# Patient Record
Sex: FEMALE | Race: WHITE | NOT HISPANIC OR LATINO | Employment: OTHER | URBAN - METROPOLITAN AREA
[De-identification: names, ages, dates, MRNs, and addresses within clinical notes are randomized per-mention and may not be internally consistent; named-entity substitution may affect disease eponyms.]

---

## 2017-10-10 ENCOUNTER — HOSPITAL ENCOUNTER (OUTPATIENT)
Dept: RADIOLOGY | Facility: MEDICAL CENTER | Age: 62
DRG: 460 | End: 2017-10-10
Attending: NEUROLOGICAL SURGERY | Admitting: NEUROLOGICAL SURGERY
Payer: COMMERCIAL

## 2017-10-10 DIAGNOSIS — Z01.812 PRE-OPERATIVE LABORATORY EXAMINATION: ICD-10-CM

## 2017-10-10 DIAGNOSIS — Z01.810 PRE-OPERATIVE CARDIOVASCULAR EXAMINATION: ICD-10-CM

## 2017-10-10 LAB
ANION GAP SERPL CALC-SCNC: 9 MMOL/L (ref 0–11.9)
APTT PPP: 30.2 SEC (ref 24.7–36)
BASOPHILS # BLD AUTO: 0.8 % (ref 0–1.8)
BASOPHILS # BLD: 0.07 K/UL (ref 0–0.12)
BUN SERPL-MCNC: 18 MG/DL (ref 8–22)
CALCIUM SERPL-MCNC: 9.3 MG/DL (ref 8.5–10.5)
CHLORIDE SERPL-SCNC: 105 MMOL/L (ref 96–112)
CO2 SERPL-SCNC: 25 MMOL/L (ref 20–33)
CREAT SERPL-MCNC: 0.74 MG/DL (ref 0.5–1.4)
EKG IMPRESSION: NORMAL
EOSINOPHIL # BLD AUTO: 0.17 K/UL (ref 0–0.51)
EOSINOPHIL NFR BLD: 2 % (ref 0–6.9)
ERYTHROCYTE [DISTWIDTH] IN BLOOD BY AUTOMATED COUNT: 42.1 FL (ref 35.9–50)
GFR SERPL CREATININE-BSD FRML MDRD: >60 ML/MIN/1.73 M 2
GLUCOSE SERPL-MCNC: 113 MG/DL (ref 65–99)
HCT VFR BLD AUTO: 42.3 % (ref 37–47)
HGB BLD-MCNC: 14.2 G/DL (ref 12–16)
IMM GRANULOCYTES # BLD AUTO: 0.03 K/UL (ref 0–0.11)
IMM GRANULOCYTES NFR BLD AUTO: 0.4 % (ref 0–0.9)
INR PPP: 1.02 (ref 0.87–1.13)
LYMPHOCYTES # BLD AUTO: 2.45 K/UL (ref 1–4.8)
LYMPHOCYTES NFR BLD: 28.8 % (ref 22–41)
MCH RBC QN AUTO: 28.6 PG (ref 27–33)
MCHC RBC AUTO-ENTMCNC: 33.6 G/DL (ref 33.6–35)
MCV RBC AUTO: 85.3 FL (ref 81.4–97.8)
MONOCYTES # BLD AUTO: 0.61 K/UL (ref 0–0.85)
MONOCYTES NFR BLD AUTO: 7.2 % (ref 0–13.4)
NEUTROPHILS # BLD AUTO: 5.17 K/UL (ref 2–7.15)
NEUTROPHILS NFR BLD: 60.8 % (ref 44–72)
NRBC # BLD AUTO: 0 K/UL
NRBC BLD AUTO-RTO: 0 /100 WBC
PLATELET # BLD AUTO: 293 K/UL (ref 164–446)
PMV BLD AUTO: 9.5 FL (ref 9–12.9)
POTASSIUM SERPL-SCNC: 4.1 MMOL/L (ref 3.6–5.5)
PROTHROMBIN TIME: 13.7 SEC (ref 12–14.6)
RBC # BLD AUTO: 4.96 M/UL (ref 4.2–5.4)
SODIUM SERPL-SCNC: 139 MMOL/L (ref 135–145)
WBC # BLD AUTO: 8.5 K/UL (ref 4.8–10.8)

## 2017-10-10 PROCEDURE — 71020 DX-CHEST-2 VIEWS: CPT

## 2017-10-10 PROCEDURE — 93005 ELECTROCARDIOGRAM TRACING: CPT

## 2017-10-10 PROCEDURE — 36415 COLL VENOUS BLD VENIPUNCTURE: CPT

## 2017-10-10 PROCEDURE — 85025 COMPLETE CBC W/AUTO DIFF WBC: CPT

## 2017-10-10 PROCEDURE — 85730 THROMBOPLASTIN TIME PARTIAL: CPT

## 2017-10-10 PROCEDURE — 80048 BASIC METABOLIC PNL TOTAL CA: CPT

## 2017-10-10 PROCEDURE — 93010 ELECTROCARDIOGRAM REPORT: CPT | Performed by: INTERNAL MEDICINE

## 2017-10-10 PROCEDURE — 85610 PROTHROMBIN TIME: CPT

## 2017-10-10 RX ORDER — SIMVASTATIN 40 MG
40 TABLET ORAL NIGHTLY
COMMUNITY

## 2017-10-10 RX ORDER — LORATADINE 10 MG/1
10 TABLET ORAL DAILY
COMMUNITY
End: 2019-02-28 | Stop reason: CLARIF

## 2017-10-10 RX ORDER — IBUPROFEN 800 MG/1
800 TABLET ORAL 3 TIMES DAILY
Status: ON HOLD | COMMUNITY
End: 2017-10-23

## 2017-10-10 RX ORDER — GABAPENTIN 300 MG/1
300 CAPSULE ORAL 3 TIMES DAILY
COMMUNITY

## 2017-10-10 RX ORDER — AMITRIPTYLINE HYDROCHLORIDE 150 MG/1
300 TABLET ORAL NIGHTLY
Status: ON HOLD | COMMUNITY
End: 2019-03-15

## 2017-10-10 RX ORDER — LEVOTHYROXINE SODIUM 0.15 MG/1
150 TABLET ORAL
COMMUNITY

## 2017-10-10 RX ORDER — LISINOPRIL 40 MG/1
40 TABLET ORAL DAILY
COMMUNITY
End: 2019-02-28 | Stop reason: CLARIF

## 2017-10-10 RX ORDER — HYDROCODONE BITARTRATE AND ACETAMINOPHEN 5; 325 MG/1; MG/1
1-2 TABLET ORAL PRN
COMMUNITY
End: 2019-02-28 | Stop reason: CLARIF

## 2017-10-10 RX ORDER — MULTIVIT WITH MINERALS/LUTEIN
1 TABLET ORAL 2 TIMES DAILY
COMMUNITY

## 2017-10-12 ENCOUNTER — APPOINTMENT (OUTPATIENT)
Dept: RADIOLOGY | Facility: MEDICAL CENTER | Age: 62
End: 2017-10-12
Attending: NEUROLOGICAL SURGERY
Payer: COMMERCIAL

## 2017-10-12 DIAGNOSIS — R93.7 ABNORMAL FINDINGS ON DIAGNOSTIC IMAGING OF MUSCULOSKELETAL SYSTEM: ICD-10-CM

## 2017-10-12 PROCEDURE — A9579 GAD-BASE MR CONTRAST NOS,1ML: HCPCS | Performed by: NEUROLOGICAL SURGERY

## 2017-10-12 PROCEDURE — 72158 MRI LUMBAR SPINE W/O & W/DYE: CPT

## 2017-10-12 PROCEDURE — 700117 HCHG RX CONTRAST REV CODE 255: Performed by: NEUROLOGICAL SURGERY

## 2017-10-12 RX ADMIN — GADODIAMIDE 20 ML: 287 INJECTION INTRAVENOUS at 14:42

## 2017-10-17 ENCOUNTER — HOSPITAL ENCOUNTER (OUTPATIENT)
Dept: RADIOLOGY | Facility: MEDICAL CENTER | Age: 62
End: 2017-10-17
Attending: NEUROLOGICAL SURGERY
Payer: COMMERCIAL

## 2017-10-17 DIAGNOSIS — M54.5 BILATERAL LOW BACK PAIN, UNSPECIFIED CHRONICITY, WITH SCIATICA PRESENCE UNSPECIFIED: ICD-10-CM

## 2017-10-17 PROCEDURE — 72100 X-RAY EXAM L-S SPINE 2/3 VWS: CPT

## 2017-10-20 ENCOUNTER — APPOINTMENT (OUTPATIENT)
Dept: RADIOLOGY | Facility: MEDICAL CENTER | Age: 62
DRG: 460 | End: 2017-10-20
Attending: NEUROLOGICAL SURGERY
Payer: COMMERCIAL

## 2017-10-20 ENCOUNTER — HOSPITAL ENCOUNTER (OUTPATIENT)
Dept: RADIOLOGY | Facility: MEDICAL CENTER | Age: 62
End: 2017-10-20

## 2017-10-20 ENCOUNTER — HOSPITAL ENCOUNTER (INPATIENT)
Facility: MEDICAL CENTER | Age: 62
LOS: 3 days | DRG: 460 | End: 2017-10-23
Attending: NEUROLOGICAL SURGERY | Admitting: NEUROLOGICAL SURGERY
Payer: COMMERCIAL

## 2017-10-20 PROBLEM — M54.50 LOWER BACK PAIN: Status: ACTIVE | Noted: 2017-10-20

## 2017-10-20 LAB
GLUCOSE BLD-MCNC: 116 MG/DL (ref 65–99)
GLUCOSE BLD-MCNC: 193 MG/DL (ref 65–99)

## 2017-10-20 PROCEDURE — A9270 NON-COVERED ITEM OR SERVICE: HCPCS | Performed by: CLINICAL NURSE SPECIALIST

## 2017-10-20 PROCEDURE — 700111 HCHG RX REV CODE 636 W/ 250 OVERRIDE (IP)

## 2017-10-20 PROCEDURE — 8E0WXBF COMPUTER ASSISTED PROCEDURE OF TRUNK REGION, WITH FLUOROSCOPY: ICD-10-PCS | Performed by: NEUROLOGICAL SURGERY

## 2017-10-20 PROCEDURE — 700101 HCHG RX REV CODE 250

## 2017-10-20 PROCEDURE — 503195 HCHG SEALER, BIPOLAR AQUAMANTYS: Performed by: NEUROLOGICAL SURGERY

## 2017-10-20 PROCEDURE — 500885 HCHG PACK, JACKSON TABLE: Performed by: NEUROLOGICAL SURGERY

## 2017-10-20 PROCEDURE — 502626 HCHG SURGIFLO HEMOSTATIC MATRIX 6ML: Performed by: NEUROLOGICAL SURGERY

## 2017-10-20 PROCEDURE — 700102 HCHG RX REV CODE 250 W/ 637 OVERRIDE(OP)

## 2017-10-20 PROCEDURE — A9270 NON-COVERED ITEM OR SERVICE: HCPCS

## 2017-10-20 PROCEDURE — 500819 HCHG MARKERS, PASSIVE REFLECTIVE: Performed by: NEUROLOGICAL SURGERY

## 2017-10-20 PROCEDURE — 500864 HCHG NEEDLE, SPINAL 18G: Performed by: NEUROLOGICAL SURGERY

## 2017-10-20 PROCEDURE — 160029 HCHG SURGERY MINUTES - 1ST 30 MINS LEVEL 4: Performed by: NEUROLOGICAL SURGERY

## 2017-10-20 PROCEDURE — 4A11X4G MONITORING OF PERIPHERAL NERVOUS ELECTRICAL ACTIVITY, INTRAOPERATIVE, EXTERNAL APPROACH: ICD-10-PCS | Performed by: NEUROLOGICAL SURGERY

## 2017-10-20 PROCEDURE — 500367 HCHG DRAIN KIT, HEMOVAC: Performed by: NEUROLOGICAL SURGERY

## 2017-10-20 PROCEDURE — 501423 HCHG SPONGE, SURGIFOAM 12X7: Performed by: NEUROLOGICAL SURGERY

## 2017-10-20 PROCEDURE — A6404 STERILE GAUZE > 48 SQ IN: HCPCS | Performed by: NEUROLOGICAL SURGERY

## 2017-10-20 PROCEDURE — 500105 HCHG BONE MILL BM200: Performed by: NEUROLOGICAL SURGERY

## 2017-10-20 PROCEDURE — 95940 IONM IN OPERATNG ROOM 15 MIN: CPT | Performed by: NEUROLOGICAL SURGERY

## 2017-10-20 PROCEDURE — 700102 HCHG RX REV CODE 250 W/ 637 OVERRIDE(OP): Performed by: CLINICAL NURSE SPECIALIST

## 2017-10-20 PROCEDURE — 770001 HCHG ROOM/CARE - MED/SURG/GYN PRIV*

## 2017-10-20 PROCEDURE — A4314 CATH W/DRAINAGE 2-WAY LATEX: HCPCS | Performed by: NEUROLOGICAL SURGERY

## 2017-10-20 PROCEDURE — 700101 HCHG RX REV CODE 250: Performed by: CLINICAL NURSE SPECIALIST

## 2017-10-20 PROCEDURE — 160035 HCHG PACU - 1ST 60 MINS PHASE I: Performed by: NEUROLOGICAL SURGERY

## 2017-10-20 PROCEDURE — 0SG0071 FUSION OF LUMBAR VERTEBRAL JOINT WITH AUTOLOGOUS TISSUE SUBSTITUTE, POSTERIOR APPROACH, POSTERIOR COLUMN, OPEN APPROACH: ICD-10-PCS | Performed by: NEUROLOGICAL SURGERY

## 2017-10-20 PROCEDURE — 160036 HCHG PACU - EA ADDL 30 MINS PHASE I: Performed by: NEUROLOGICAL SURGERY

## 2017-10-20 PROCEDURE — 160002 HCHG RECOVERY MINUTES (STAT): Performed by: NEUROLOGICAL SURGERY

## 2017-10-20 PROCEDURE — 502000 HCHG MISC OR IMPLANTS RC 0278: Performed by: NEUROLOGICAL SURGERY

## 2017-10-20 PROCEDURE — 700112 HCHG RX REV CODE 229: Performed by: CLINICAL NURSE SPECIALIST

## 2017-10-20 PROCEDURE — 01NB0ZZ RELEASE LUMBAR NERVE, OPEN APPROACH: ICD-10-PCS | Performed by: NEUROLOGICAL SURGERY

## 2017-10-20 PROCEDURE — 501838 HCHG SUTURE GENERAL: Performed by: NEUROLOGICAL SURGERY

## 2017-10-20 PROCEDURE — 160041 HCHG SURGERY MINUTES - EA ADDL 1 MIN LEVEL 4: Performed by: NEUROLOGICAL SURGERY

## 2017-10-20 PROCEDURE — 82962 GLUCOSE BLOOD TEST: CPT

## 2017-10-20 PROCEDURE — 160048 HCHG OR STATISTICAL LEVEL 1-5: Performed by: NEUROLOGICAL SURGERY

## 2017-10-20 PROCEDURE — 700111 HCHG RX REV CODE 636 W/ 250 OVERRIDE (IP): Performed by: CLINICAL NURSE SPECIALIST

## 2017-10-20 PROCEDURE — A4450 NON-WATERPROOF TAPE: HCPCS | Performed by: NEUROLOGICAL SURGERY

## 2017-10-20 PROCEDURE — 160009 HCHG ANES TIME/MIN: Performed by: NEUROLOGICAL SURGERY

## 2017-10-20 PROCEDURE — 4A10X4G MONITORING OF CENTRAL NERVOUS ELECTRICAL ACTIVITY, INTRAOPERATIVE, EXTERNAL APPROACH: ICD-10-PCS | Performed by: NEUROLOGICAL SURGERY

## 2017-10-20 PROCEDURE — 72020 X-RAY EXAM OF SPINE 1 VIEW: CPT

## 2017-10-20 DEVICE — IMPLANTABLE DEVICE: Type: IMPLANTABLE DEVICE | Site: SPINE LUMBAR | Status: FUNCTIONAL

## 2017-10-20 DEVICE — SCREW MAS  SOLERA 5.5 X 45MM (1TCX8+3TCX8=32): Type: IMPLANTABLE DEVICE | Site: SPINE LUMBAR | Status: FUNCTIONAL

## 2017-10-20 DEVICE — SCREW SOLERA SET SCREW (1TCX40+3TCX21+2TCX10=123): Type: IMPLANTABLE DEVICE | Site: SPINE LUMBAR | Status: FUNCTIONAL

## 2017-10-20 RX ORDER — SIMVASTATIN 20 MG
40 TABLET ORAL NIGHTLY
Status: DISCONTINUED | OUTPATIENT
Start: 2017-10-20 | End: 2017-10-23 | Stop reason: HOSPADM

## 2017-10-20 RX ORDER — CLONIDINE HYDROCHLORIDE 0.1 MG/1
0.1 TABLET ORAL EVERY 4 HOURS PRN
Status: DISCONTINUED | OUTPATIENT
Start: 2017-10-20 | End: 2017-10-23 | Stop reason: HOSPADM

## 2017-10-20 RX ORDER — METOPROLOL SUCCINATE 25 MG/1
25 TABLET, EXTENDED RELEASE ORAL DAILY
Status: ON HOLD | COMMUNITY
End: 2019-03-15

## 2017-10-20 RX ORDER — LORATADINE 10 MG/1
10 TABLET ORAL DAILY
Status: DISCONTINUED | OUTPATIENT
Start: 2017-10-21 | End: 2017-10-23 | Stop reason: HOSPADM

## 2017-10-20 RX ORDER — TIZANIDINE 4 MG/1
2 TABLET ORAL 3 TIMES DAILY PRN
Status: DISCONTINUED | OUTPATIENT
Start: 2017-10-20 | End: 2017-10-23 | Stop reason: HOSPADM

## 2017-10-20 RX ORDER — LIDOCAINE HYDROCHLORIDE 10 MG/ML
INJECTION, SOLUTION INFILTRATION; PERINEURAL
Status: COMPLETED
Start: 2017-10-20 | End: 2017-10-20

## 2017-10-20 RX ORDER — HYDRALAZINE HYDROCHLORIDE 20 MG/ML
10 INJECTION INTRAMUSCULAR; INTRAVENOUS
Status: DISCONTINUED | OUTPATIENT
Start: 2017-10-20 | End: 2017-10-23 | Stop reason: HOSPADM

## 2017-10-20 RX ORDER — METOPROLOL SUCCINATE 25 MG/1
25 TABLET, EXTENDED RELEASE ORAL DAILY
Status: DISCONTINUED | OUTPATIENT
Start: 2017-10-21 | End: 2017-10-23 | Stop reason: HOSPADM

## 2017-10-20 RX ORDER — PROMETHAZINE HYDROCHLORIDE 25 MG/1
12.5-25 TABLET ORAL EVERY 4 HOURS PRN
Status: DISCONTINUED | OUTPATIENT
Start: 2017-10-20 | End: 2017-10-23 | Stop reason: HOSPADM

## 2017-10-20 RX ORDER — LISINOPRIL 20 MG/1
40 TABLET ORAL DAILY
Status: DISCONTINUED | OUTPATIENT
Start: 2017-10-21 | End: 2017-10-23 | Stop reason: HOSPADM

## 2017-10-20 RX ORDER — ONDANSETRON 2 MG/ML
4 INJECTION INTRAMUSCULAR; INTRAVENOUS EVERY 4 HOURS PRN
Status: DISCONTINUED | OUTPATIENT
Start: 2017-10-20 | End: 2017-10-23 | Stop reason: HOSPADM

## 2017-10-20 RX ORDER — CALCIUM CARBONATE 500 MG/1
500 TABLET, CHEWABLE ORAL 2 TIMES DAILY
Status: DISCONTINUED | OUTPATIENT
Start: 2017-10-20 | End: 2017-10-23 | Stop reason: HOSPADM

## 2017-10-20 RX ORDER — MORPHINE SULFATE 10 MG/ML
INJECTION, SOLUTION INTRAMUSCULAR; INTRAVENOUS
Status: DISPENSED
Start: 2017-10-20 | End: 2017-10-20

## 2017-10-20 RX ORDER — DEXTROSE MONOHYDRATE 25 G/50ML
25 INJECTION, SOLUTION INTRAVENOUS
Status: DISCONTINUED | OUTPATIENT
Start: 2017-10-20 | End: 2017-10-23 | Stop reason: HOSPADM

## 2017-10-20 RX ORDER — PROMETHAZINE HYDROCHLORIDE 25 MG/1
12.5-25 SUPPOSITORY RECTAL EVERY 4 HOURS PRN
Status: DISCONTINUED | OUTPATIENT
Start: 2017-10-20 | End: 2017-10-23 | Stop reason: HOSPADM

## 2017-10-20 RX ORDER — POLYETHYLENE GLYCOL 3350 17 G/17G
1 POWDER, FOR SOLUTION ORAL 2 TIMES DAILY PRN
Status: DISCONTINUED | OUTPATIENT
Start: 2017-10-20 | End: 2017-10-23 | Stop reason: HOSPADM

## 2017-10-20 RX ORDER — AMOXICILLIN 250 MG
1 CAPSULE ORAL NIGHTLY
Status: DISCONTINUED | OUTPATIENT
Start: 2017-10-20 | End: 2017-10-23 | Stop reason: HOSPADM

## 2017-10-20 RX ORDER — LIDOCAINE HYDROCHLORIDE 10 MG/ML
0.5 INJECTION, SOLUTION INFILTRATION; PERINEURAL
Status: COMPLETED | OUTPATIENT
Start: 2017-10-20 | End: 2017-10-20

## 2017-10-20 RX ORDER — ONDANSETRON 4 MG/1
4 TABLET, ORALLY DISINTEGRATING ORAL EVERY 4 HOURS PRN
Status: DISCONTINUED | OUTPATIENT
Start: 2017-10-20 | End: 2017-10-23 | Stop reason: HOSPADM

## 2017-10-20 RX ORDER — ENEMA 19; 7 G/133ML; G/133ML
1 ENEMA RECTAL
Status: DISCONTINUED | OUTPATIENT
Start: 2017-10-20 | End: 2017-10-23 | Stop reason: HOSPADM

## 2017-10-20 RX ORDER — OXYCODONE HCL 10 MG/1
TABLET, FILM COATED, EXTENDED RELEASE ORAL
Status: COMPLETED
Start: 2017-10-20 | End: 2017-10-20

## 2017-10-20 RX ORDER — AMOXICILLIN 250 MG
1 CAPSULE ORAL
Status: DISCONTINUED | OUTPATIENT
Start: 2017-10-20 | End: 2017-10-23 | Stop reason: HOSPADM

## 2017-10-20 RX ORDER — LABETALOL HYDROCHLORIDE 5 MG/ML
10 INJECTION, SOLUTION INTRAVENOUS
Status: DISCONTINUED | OUTPATIENT
Start: 2017-10-20 | End: 2017-10-23 | Stop reason: HOSPADM

## 2017-10-20 RX ORDER — AMITRIPTYLINE HYDROCHLORIDE 150 MG/1
300 TABLET ORAL NIGHTLY
Status: DISCONTINUED | OUTPATIENT
Start: 2017-10-20 | End: 2017-10-23 | Stop reason: HOSPADM

## 2017-10-20 RX ORDER — LEVOTHYROXINE SODIUM 0.15 MG/1
150 TABLET ORAL
Status: DISCONTINUED | OUTPATIENT
Start: 2017-10-21 | End: 2017-10-23 | Stop reason: HOSPADM

## 2017-10-20 RX ORDER — GABAPENTIN 300 MG/1
CAPSULE ORAL
Status: COMPLETED
Start: 2017-10-20 | End: 2017-10-20

## 2017-10-20 RX ORDER — SCOLOPAMINE TRANSDERMAL SYSTEM 1 MG/1
PATCH, EXTENDED RELEASE TRANSDERMAL
Status: DISPENSED
Start: 2017-10-20 | End: 2017-10-20

## 2017-10-20 RX ORDER — FLUOXETINE HYDROCHLORIDE 20 MG/1
40 CAPSULE ORAL DAILY
Status: DISCONTINUED | OUTPATIENT
Start: 2017-10-21 | End: 2017-10-23 | Stop reason: HOSPADM

## 2017-10-20 RX ORDER — BISACODYL 10 MG
10 SUPPOSITORY, RECTAL RECTAL
Status: DISCONTINUED | OUTPATIENT
Start: 2017-10-20 | End: 2017-10-23 | Stop reason: HOSPADM

## 2017-10-20 RX ORDER — CEFAZOLIN SODIUM 2 G/100ML
2 INJECTION, SOLUTION INTRAVENOUS EVERY 8 HOURS
Status: COMPLETED | OUTPATIENT
Start: 2017-10-20 | End: 2017-10-21

## 2017-10-20 RX ORDER — ACETAMINOPHEN 500 MG
TABLET ORAL
Status: COMPLETED
Start: 2017-10-20 | End: 2017-10-20

## 2017-10-20 RX ORDER — DIPHENHYDRAMINE HCL 25 MG
25 TABLET ORAL EVERY 6 HOURS PRN
Status: DISCONTINUED | OUTPATIENT
Start: 2017-10-20 | End: 2017-10-23 | Stop reason: HOSPADM

## 2017-10-20 RX ORDER — SODIUM CHLORIDE, SODIUM LACTATE, POTASSIUM CHLORIDE, CALCIUM CHLORIDE 600; 310; 30; 20 MG/100ML; MG/100ML; MG/100ML; MG/100ML
INJECTION, SOLUTION INTRAVENOUS CONTINUOUS
Status: DISCONTINUED | OUTPATIENT
Start: 2017-10-20 | End: 2017-10-23 | Stop reason: HOSPADM

## 2017-10-20 RX ORDER — DIPHENHYDRAMINE HYDROCHLORIDE 50 MG/ML
25 INJECTION INTRAMUSCULAR; INTRAVENOUS EVERY 6 HOURS PRN
Status: DISCONTINUED | OUTPATIENT
Start: 2017-10-20 | End: 2017-10-23 | Stop reason: HOSPADM

## 2017-10-20 RX ORDER — FLUOXETINE HYDROCHLORIDE 40 MG/1
40 CAPSULE ORAL DAILY
COMMUNITY
End: 2019-02-28 | Stop reason: CLARIF

## 2017-10-20 RX ORDER — BUPIVACAINE HYDROCHLORIDE AND EPINEPHRINE 5; 5 MG/ML; UG/ML
INJECTION, SOLUTION PERINEURAL
Status: DISCONTINUED | OUTPATIENT
Start: 2017-10-20 | End: 2017-10-20 | Stop reason: HOSPADM

## 2017-10-20 RX ORDER — GABAPENTIN 300 MG/1
600 CAPSULE ORAL 3 TIMES DAILY
Status: DISCONTINUED | OUTPATIENT
Start: 2017-10-20 | End: 2017-10-23 | Stop reason: HOSPADM

## 2017-10-20 RX ORDER — LIDOCAINE AND PRILOCAINE 25; 25 MG/G; MG/G
1 CREAM TOPICAL
Status: COMPLETED | OUTPATIENT
Start: 2017-10-20 | End: 2017-10-20

## 2017-10-20 RX ORDER — DOCUSATE SODIUM 100 MG/1
100 CAPSULE, LIQUID FILLED ORAL 2 TIMES DAILY
Status: DISCONTINUED | OUTPATIENT
Start: 2017-10-20 | End: 2017-10-23 | Stop reason: HOSPADM

## 2017-10-20 RX ORDER — SODIUM CHLORIDE AND POTASSIUM CHLORIDE 150; 900 MG/100ML; MG/100ML
INJECTION, SOLUTION INTRAVENOUS CONTINUOUS
Status: DISCONTINUED | OUTPATIENT
Start: 2017-10-20 | End: 2017-10-23 | Stop reason: HOSPADM

## 2017-10-20 RX ORDER — BACITRACIN 50000 [IU]/1
INJECTION, POWDER, FOR SOLUTION INTRAMUSCULAR
Status: DISCONTINUED | OUTPATIENT
Start: 2017-10-20 | End: 2017-10-20 | Stop reason: HOSPADM

## 2017-10-20 RX ADMIN — SODIUM CHLORIDE, SODIUM LACTATE, POTASSIUM CHLORIDE, CALCIUM CHLORIDE: 600; 310; 30; 20 INJECTION, SOLUTION INTRAVENOUS at 10:45

## 2017-10-20 RX ADMIN — FENTANYL CITRATE 25 MCG: 50 INJECTION, SOLUTION INTRAMUSCULAR; INTRAVENOUS at 16:15

## 2017-10-20 RX ADMIN — FENTANYL CITRATE 50 MCG: 50 INJECTION, SOLUTION INTRAMUSCULAR; INTRAVENOUS at 17:10

## 2017-10-20 RX ADMIN — LIDOCAINE HYDROCHLORIDE 0.5 ML: 10 INJECTION, SOLUTION INFILTRATION; PERINEURAL at 10:10

## 2017-10-20 RX ADMIN — DOCUSATE SODIUM 100 MG: 100 CAPSULE ORAL at 18:30

## 2017-10-20 RX ADMIN — FENTANYL CITRATE 25 MCG: 50 INJECTION, SOLUTION INTRAMUSCULAR; INTRAVENOUS at 16:25

## 2017-10-20 RX ADMIN — OXYCODONE HYDROCHLORIDE 10 MG: 10 TABLET, FILM COATED, EXTENDED RELEASE ORAL at 09:52

## 2017-10-20 RX ADMIN — ANTACID TABLETS 500 MG: 500 TABLET, CHEWABLE ORAL at 20:19

## 2017-10-20 RX ADMIN — CEFAZOLIN SODIUM 2 G: 2 INJECTION, SOLUTION INTRAVENOUS at 20:26

## 2017-10-20 RX ADMIN — ACETAMINOPHEN 1000 MG: 500 TABLET, FILM COATED ORAL at 09:52

## 2017-10-20 RX ADMIN — AMITRIPTYLINE HYDROCHLORIDE 300 MG: 150 TABLET, FILM COATED ORAL at 20:20

## 2017-10-20 RX ADMIN — MORPHINE SULFATE: 50 INJECTION, SOLUTION, CONCENTRATE INTRAVENOUS at 16:45

## 2017-10-20 RX ADMIN — BENZOCAINE AND MENTHOL 1 LOZENGE: 15; 3.6 LOZENGE ORAL at 20:26

## 2017-10-20 RX ADMIN — STANDARDIZED SENNA CONCENTRATE AND DOCUSATE SODIUM 1 TABLET: 8.6; 5 TABLET, FILM COATED ORAL at 20:19

## 2017-10-20 RX ADMIN — FENTANYL CITRATE 50 MCG: 50 INJECTION, SOLUTION INTRAMUSCULAR; INTRAVENOUS at 16:49

## 2017-10-20 RX ADMIN — GABAPENTIN 600 MG: 300 CAPSULE ORAL at 20:18

## 2017-10-20 RX ADMIN — FENTANYL CITRATE 25 MCG: 50 INJECTION, SOLUTION INTRAMUSCULAR; INTRAVENOUS at 16:10

## 2017-10-20 RX ADMIN — POTASSIUM CHLORIDE AND SODIUM CHLORIDE: 900; 150 INJECTION, SOLUTION INTRAVENOUS at 20:20

## 2017-10-20 RX ADMIN — SIMVASTATIN 40 MG: 20 TABLET, FILM COATED ORAL at 20:18

## 2017-10-20 RX ADMIN — GABAPENTIN 300 MG: 300 CAPSULE ORAL at 09:52

## 2017-10-20 ASSESSMENT — PAIN SCALES - GENERAL
PAINLEVEL_OUTOF10: 7
PAINLEVEL_OUTOF10: 7
PAINLEVEL_OUTOF10: 10
PAINLEVEL_OUTOF10: 7
PAINLEVEL_OUTOF10: 8
PAINLEVEL_OUTOF10: 6
PAINLEVEL_OUTOF10: 7
PAINLEVEL_OUTOF10: 6
PAINLEVEL_OUTOF10: 8
PAINLEVEL_OUTOF10: 6
PAINLEVEL_OUTOF10: 7

## 2017-10-20 ASSESSMENT — COPD QUESTIONNAIRES
DURING THE PAST 4 WEEKS HOW MUCH DID YOU FEEL SHORT OF BREATH: SOME OF THE TIME
HAVE YOU SMOKED AT LEAST 100 CIGARETTES IN YOUR ENTIRE LIFE: YES
DO YOU EVER COUGH UP ANY MUCUS OR PHLEGM?: YES, A FEW DAYS A WEEK OR MONTH
COPD SCREENING SCORE: 6

## 2017-10-20 ASSESSMENT — LIFESTYLE VARIABLES: EVER_SMOKED: YES

## 2017-10-20 NOTE — OP REPORT
NEUROSURGERY OPERATIVE NOTE  DATE:  3:39 PM    PATIENT NAME:  Sonya Segovia   1955 MRN [id number]      PROCEDURE:  1.  Lumbar 2, 3 laminectomy  2.  bilaterlal Lumbar 2/3 foraminotomy  3. Bilateral pedicle screw placement L2, L3 ([employer], Medtronic)  4. Left OArm/intraoperative navigation  5. Autograft placement L2, L3 intertransverse process space  6. SSEP, MEP (neurophysiologic monitoring)  7. Bilateral L5/S1 laminoforaminotomy    Surgeon:  Saud Mehta MD, PhD  Assistant:  REBA Romero    Anesthesia:  GETA    Diagnosis:  Lumbar stenosis, neurogenic claudication, lumbar radiculopathy, lumbar spondylosis, back pain    Indication: 62-year-old female with progressive back pain and leg numbness/tingling. Imaging demonstrates fairly significant central canal stenosis at L2-3 with significant Modic type II change associated with the disc space, as well as kyphosis at this level which appears to be degenerative in nature. She also has posterior and lateral thigh pain, imaging demonstrates bilateral L5/S1 neural foraminal stenosis. L2-3 pedicle screw fusion with laminectomy, foraminotomies and bilateral L5/S1 laminotomy foraminotomy is planned.    Procedure:  The patient was identified in the holding area, and the surgery site marked, consent was obtained.  The patient was brought back to the operating room and intubated by anesthesia service.  Two grams Ancef was administered intravenously.  Neurophysiologic leads were placed by the neurophysiologic technician. Good baseline signals were noted. She was transferred to the operating room table in a prone manner and all pressure points well padded. Her lumbar region was prepped with hair clipping, chlorhexidine and betadine scrub, and Chloroprep.  Sterile drapes were applied including a layer of Ioban.  The correct vertebral levels were identified flouroscopically. The planned midline incision was infiltrated with 1% lidocaine/epinephrine.  The skin  was incised sharply, and dissection carried deeply using monopolar cautery. In this manner the L2-L5 lamina were exposed. Self-retaining retractors were placed. The L2 and L3 lamina were verified flouroscopically. Her spine was registered to the [employer] intraoperative navigation system using the alarm. Good registration was verified. Pedicle screws were then placed in the bilateral L2 and L3 pedicles. Briefly, the pedicle was identified using standard standard landmarks and verified using Stealth. The cortex overlying the pedicle was removed using the high-speed drill fit with the M8 bit. An awl was passed through the pedicle under navigation. The trajectory was then tapped using a 4.5 mm tap under navigation, and the tapped pathway probed with the blunt tip Stealth probe, verifying bony walls. 5.5 x 45 mm screws were then placed at all levels. The screws were stimulated. The following thresholds were achieved: Left L2 26 mA right L2 20 mA left L3 42 mA right L3 46 mA. Troughs were created along the lateral aspect of the L2 and L3 lamina using the high speed drill with the M8 bit.  The L2, L3 lamina/spinous processes were removed en-bloc.   Bilateral L2, L3 neuroforaminotomies were performed using Kerrison rongeurs.  The neuroforamen were explored with a Dandy nerve hook; good decompression was noted.  Laminotomies were then produced and the bilateral L5 lamina using the high-speed drill fit with the M8 bit until the underlying ligamentum flavum was encountered. The ligamentum flavum was divided using a straight curette, and removed using 3 mm Kerrison rongeur. The laminotomy was then carried inferiorly, and a foraminotomy was performed using the 3 mm Kerrison rongeur. The neuroforamen were probed using a blunt nerve hook, and no residual compression noted. Gelfoam was placed over the exposed dura at L5 as well as the L2/L3 levels. Good hemostasis was noted. The operative site was copiously irrigated with normal  saline bacitracin irrigation. The L2 and L3 transverse processes, which were quite robust, were decorticated using high-speed drill fit with the AMA drill bit. Morcellized bone obtained from the laminectomy and laminotomy were then placed in the intertransverse process space. 5.5 mm titanium pre-bent rods were then placed spanning the pedicle screws, and set screws used to secure the parveen in position. The rods were final tightened. The lumbar musculature was reapproximated using 1-0 Vicryl suture in an interrupted inverted manner. The lumbar fascia was reapproximated using 2-0 Stratafix suture in a running manner. Through a Vicryl suture was used to reapproximate the adipose tissue. A medium Hemovac drain was placed and brought out through a separate incision.  The drain was secured to the skin using 1-0 Vicryl suture.   The skin was reapproximated with staples. Bacitracin ointment, Xerform gauze and a sterile dressing were applied.  Final counts were correct. No change in baseline neurophysiologic signals were noted at end of procedure.    FINDINGS:  Significant central canal stenosis, neural foraminal stenosis good decompression obtained.  SPECIMEN:  None  DRAINS: Hemovac  EBL: 100 CC  COMPLICATIONS:  None apparent at end of procedure.

## 2017-10-21 LAB
ANION GAP SERPL CALC-SCNC: 5 MMOL/L (ref 0–11.9)
BUN SERPL-MCNC: 18 MG/DL (ref 8–22)
CALCIUM SERPL-MCNC: 8.7 MG/DL (ref 8.5–10.5)
CHLORIDE SERPL-SCNC: 104 MMOL/L (ref 96–112)
CO2 SERPL-SCNC: 28 MMOL/L (ref 20–33)
CREAT SERPL-MCNC: 0.75 MG/DL (ref 0.5–1.4)
ERYTHROCYTE [DISTWIDTH] IN BLOOD BY AUTOMATED COUNT: 41.3 FL (ref 35.9–50)
GFR SERPL CREATININE-BSD FRML MDRD: >60 ML/MIN/1.73 M 2
GLUCOSE BLD-MCNC: 119 MG/DL (ref 65–99)
GLUCOSE BLD-MCNC: 120 MG/DL (ref 65–99)
GLUCOSE BLD-MCNC: 122 MG/DL (ref 65–99)
GLUCOSE BLD-MCNC: 140 MG/DL (ref 65–99)
GLUCOSE SERPL-MCNC: 126 MG/DL (ref 65–99)
HCT VFR BLD AUTO: 36.4 % (ref 37–47)
HGB BLD-MCNC: 11.9 G/DL (ref 12–16)
MCH RBC QN AUTO: 28.3 PG (ref 27–33)
MCHC RBC AUTO-ENTMCNC: 32.7 G/DL (ref 33.6–35)
MCV RBC AUTO: 86.7 FL (ref 81.4–97.8)
PLATELET # BLD AUTO: 252 K/UL (ref 164–446)
PMV BLD AUTO: 9.6 FL (ref 9–12.9)
POTASSIUM SERPL-SCNC: 4.2 MMOL/L (ref 3.6–5.5)
RBC # BLD AUTO: 4.2 M/UL (ref 4.2–5.4)
SODIUM SERPL-SCNC: 137 MMOL/L (ref 135–145)
WBC # BLD AUTO: 11.2 K/UL (ref 4.8–10.8)

## 2017-10-21 PROCEDURE — G8987 SELF CARE CURRENT STATUS: HCPCS | Mod: CI

## 2017-10-21 PROCEDURE — 85027 COMPLETE CBC AUTOMATED: CPT

## 2017-10-21 PROCEDURE — 36415 COLL VENOUS BLD VENIPUNCTURE: CPT

## 2017-10-21 PROCEDURE — 700112 HCHG RX REV CODE 229: Performed by: CLINICAL NURSE SPECIALIST

## 2017-10-21 PROCEDURE — G8989 SELF CARE D/C STATUS: HCPCS | Mod: CI

## 2017-10-21 PROCEDURE — G8979 MOBILITY GOAL STATUS: HCPCS | Mod: CI

## 2017-10-21 PROCEDURE — A9270 NON-COVERED ITEM OR SERVICE: HCPCS | Performed by: CLINICAL NURSE SPECIALIST

## 2017-10-21 PROCEDURE — 90686 IIV4 VACC NO PRSV 0.5 ML IM: CPT | Performed by: NEUROLOGICAL SURGERY

## 2017-10-21 PROCEDURE — 80048 BASIC METABOLIC PNL TOTAL CA: CPT

## 2017-10-21 PROCEDURE — 700102 HCHG RX REV CODE 250 W/ 637 OVERRIDE(OP): Performed by: CLINICAL NURSE SPECIALIST

## 2017-10-21 PROCEDURE — 90471 IMMUNIZATION ADMIN: CPT

## 2017-10-21 PROCEDURE — G8978 MOBILITY CURRENT STATUS: HCPCS | Mod: CK

## 2017-10-21 PROCEDURE — 97161 PT EVAL LOW COMPLEX 20 MIN: CPT

## 2017-10-21 PROCEDURE — 700111 HCHG RX REV CODE 636 W/ 250 OVERRIDE (IP): Performed by: NEUROLOGICAL SURGERY

## 2017-10-21 PROCEDURE — 770001 HCHG ROOM/CARE - MED/SURG/GYN PRIV*

## 2017-10-21 PROCEDURE — G8988 SELF CARE GOAL STATUS: HCPCS | Mod: CI

## 2017-10-21 PROCEDURE — 82962 GLUCOSE BLOOD TEST: CPT | Mod: 91

## 2017-10-21 PROCEDURE — 97165 OT EVAL LOW COMPLEX 30 MIN: CPT

## 2017-10-21 PROCEDURE — 700111 HCHG RX REV CODE 636 W/ 250 OVERRIDE (IP): Performed by: CLINICAL NURSE SPECIALIST

## 2017-10-21 PROCEDURE — 3E0234Z INTRODUCTION OF SERUM, TOXOID AND VACCINE INTO MUSCLE, PERCUTANEOUS APPROACH: ICD-10-PCS | Performed by: NEUROLOGICAL SURGERY

## 2017-10-21 RX ORDER — HYDROCODONE BITARTRATE AND ACETAMINOPHEN 10; 325 MG/1; MG/1
2 TABLET ORAL EVERY 4 HOURS PRN
Status: DISCONTINUED | OUTPATIENT
Start: 2017-10-21 | End: 2017-10-22

## 2017-10-21 RX ORDER — HYDROCODONE BITARTRATE AND ACETAMINOPHEN 10; 325 MG/1; MG/1
1 TABLET ORAL EVERY 4 HOURS PRN
Status: DISCONTINUED | OUTPATIENT
Start: 2017-10-21 | End: 2017-10-22

## 2017-10-21 RX ADMIN — AMITRIPTYLINE HYDROCHLORIDE 300 MG: 150 TABLET, FILM COATED ORAL at 20:17

## 2017-10-21 RX ADMIN — STANDARDIZED SENNA CONCENTRATE AND DOCUSATE SODIUM 1 TABLET: 8.6; 5 TABLET, FILM COATED ORAL at 09:35

## 2017-10-21 RX ADMIN — METFORMIN HYDROCHLORIDE 1000 MG: 500 TABLET, FILM COATED ORAL at 16:28

## 2017-10-21 RX ADMIN — TIZANIDINE 2 MG: 4 TABLET ORAL at 14:56

## 2017-10-21 RX ADMIN — SIMVASTATIN 40 MG: 20 TABLET, FILM COATED ORAL at 20:21

## 2017-10-21 RX ADMIN — HYDROCODONE BITARTRATE AND ACETAMINOPHEN 2 TABLET: 10; 325 TABLET ORAL at 12:01

## 2017-10-21 RX ADMIN — ANTACID TABLETS 500 MG: 500 TABLET, CHEWABLE ORAL at 09:35

## 2017-10-21 RX ADMIN — GABAPENTIN 600 MG: 300 CAPSULE ORAL at 20:17

## 2017-10-21 RX ADMIN — ANTACID TABLETS 500 MG: 500 TABLET, CHEWABLE ORAL at 21:00

## 2017-10-21 RX ADMIN — METOPROLOL SUCCINATE 25 MG: 25 TABLET, EXTENDED RELEASE ORAL at 09:30

## 2017-10-21 RX ADMIN — DOCUSATE SODIUM 100 MG: 100 CAPSULE ORAL at 17:42

## 2017-10-21 RX ADMIN — CEFAZOLIN SODIUM 2 G: 2 INJECTION, SOLUTION INTRAVENOUS at 02:36

## 2017-10-21 RX ADMIN — INFLUENZA A VIRUS A/MICHIGAN/45/2015 X-275 (H1N1) ANTIGEN (FORMALDEHYDE INACTIVATED), INFLUENZA A VIRUS A/HONG KONG/4801/2014 X-263B (H3N2) ANTIGEN (FORMALDEHYDE INACTIVATED), INFLUENZA B VIRUS B/PHUKET/3073/2013 ANTIGEN (FORMALDEHYDE INACTIVATED), AND INFLUENZA B VIRUS B/BRISBANE/60/2008 ANTIGEN (FORMALDEHYDE INACTIVATED) 0.5 ML: 15; 15; 15; 15 INJECTION, SUSPENSION INTRAMUSCULAR at 12:06

## 2017-10-21 RX ADMIN — HYDROCODONE BITARTRATE AND ACETAMINOPHEN 2 TABLET: 10; 325 TABLET ORAL at 20:21

## 2017-10-21 RX ADMIN — TIZANIDINE 2 MG: 4 TABLET ORAL at 22:36

## 2017-10-21 RX ADMIN — STANDARDIZED SENNA CONCENTRATE AND DOCUSATE SODIUM 1 TABLET: 8.6; 5 TABLET, FILM COATED ORAL at 20:22

## 2017-10-21 RX ADMIN — METFORMIN HYDROCHLORIDE 1000 MG: 500 TABLET, FILM COATED ORAL at 05:52

## 2017-10-21 RX ADMIN — HYDROCODONE BITARTRATE AND ACETAMINOPHEN 2 TABLET: 10; 325 TABLET ORAL at 16:28

## 2017-10-21 RX ADMIN — LEVOTHYROXINE SODIUM 150 MCG: 150 TABLET ORAL at 05:52

## 2017-10-21 RX ADMIN — LISINOPRIL 40 MG: 20 TABLET ORAL at 09:30

## 2017-10-21 RX ADMIN — DOCUSATE SODIUM 100 MG: 100 CAPSULE ORAL at 05:52

## 2017-10-21 RX ADMIN — LORATADINE 10 MG: 10 TABLET ORAL at 09:29

## 2017-10-21 RX ADMIN — FLUOXETINE HYDROCHLORIDE 40 MG: 20 CAPSULE ORAL at 09:29

## 2017-10-21 RX ADMIN — GABAPENTIN 600 MG: 300 CAPSULE ORAL at 14:55

## 2017-10-21 RX ADMIN — GABAPENTIN 600 MG: 300 CAPSULE ORAL at 09:29

## 2017-10-21 ASSESSMENT — COGNITIVE AND FUNCTIONAL STATUS - GENERAL
CLIMB 3 TO 5 STEPS WITH RAILING: A LITTLE
TURNING FROM BACK TO SIDE WHILE IN FLAT BAD: A LITTLE
SUGGESTED CMS G CODE MODIFIER DAILY ACTIVITY: CI
HELP NEEDED FOR BATHING: A LITTLE
STANDING UP FROM CHAIR USING ARMS: A LITTLE
MOVING FROM LYING ON BACK TO SITTING ON SIDE OF FLAT BED: A LITTLE
MOBILITY SCORE: 18
DAILY ACTIVITIY SCORE: 23
WALKING IN HOSPITAL ROOM: A LITTLE
MOVING TO AND FROM BED TO CHAIR: A LITTLE
SUGGESTED CMS G CODE MODIFIER MOBILITY: CK

## 2017-10-21 ASSESSMENT — PATIENT HEALTH QUESTIONNAIRE - PHQ9
SUM OF ALL RESPONSES TO PHQ9 QUESTIONS 1 AND 2: 0
2. FEELING DOWN, DEPRESSED, IRRITABLE, OR HOPELESS: NOT AT ALL
SUM OF ALL RESPONSES TO PHQ QUESTIONS 1-9: 0
1. LITTLE INTEREST OR PLEASURE IN DOING THINGS: NOT AT ALL

## 2017-10-21 ASSESSMENT — PAIN SCALES - GENERAL
PAINLEVEL_OUTOF10: 5
PAINLEVEL_OUTOF10: 7
PAINLEVEL_OUTOF10: 7
PAINLEVEL_OUTOF10: 9
PAINLEVEL_OUTOF10: 8
PAINLEVEL_OUTOF10: 5
PAINLEVEL_OUTOF10: 9
PAINLEVEL_OUTOF10: 6

## 2017-10-21 ASSESSMENT — LIFESTYLE VARIABLES
DO YOU DRINK ALCOHOL: NO
ALCOHOL_USE: NO

## 2017-10-21 ASSESSMENT — GAIT ASSESSMENTS
GAIT LEVEL OF ASSIST: STAND BY ASSIST
DISTANCE (FEET): 50
ASSISTIVE DEVICE: FRONT WHEEL WALKER
DEVIATION: INCREASED BASE OF SUPPORT

## 2017-10-21 ASSESSMENT — ACTIVITIES OF DAILY LIVING (ADL): TOILETING: INDEPENDENT

## 2017-10-21 NOTE — PROGRESS NOTES
Pt. A/Ox4, states pain is 7/10. Pt. Has PCA, verified with day RN. N/t in BLE and R hand. 3L nasal cannula. Incision dressing intact. IV intact. Poc discussed, bed alarm on, call light within reach.

## 2017-10-21 NOTE — PROGRESS NOTES
Neurosurgery Progress Note    Subjective:  Pain to LB not well controlled w/ PCA, legs feel better    Exam:  Str 5/5, inc c/d flat w/ hvac 140. Amb w/ PT, hernandez just d/c'd    BP  Min: 70/56  Max: 145/74  Pulse  Av.5  Min: 80  Max: 107  Resp  Av.4  Min: 11  Max: 16  Temp  Av.3 °C (97.3 °F)  Min: 36 °C (96.8 °F)  Max: 36.6 °C (97.9 °F)  SpO2  Av.3 %  Min: 92 %  Max: 98 %    No Data Recorded    Recent Labs      10/21/17   0143   WBC  11.2*   RBC  4.20   HEMOGLOBIN  11.9*   HEMATOCRIT  36.4*   MCV  86.7   MCH  28.3   MCHC  32.7*   RDW  41.3   PLATELETCT  252   MPV  9.6     Recent Labs      10/21/17   0143   SODIUM  137   POTASSIUM  4.2   CHLORIDE  104   CO2  28   GLUCOSE  126*   BUN  18               Intake/Output       10/20/17 0700 - 10/21/17 0659 10/21/17 0700 - 10/22/17 0659      6826-5566 5528-5324 Total 1772-8113 0473-8494 Total       Intake    P.O.  --  500 500  --  -- --    P.O. -- 500 500 -- -- --    I.V.  2500  1210.5 3710.5  21  -- 21    Crystalloid Intake 2300 -- 2300 -- -- --    PCA End of Shift Total Volume (ml) -- 10.5 10.5 21 -- 21    IV Volume (IV Lactated Ringers) 200 1200 1400 -- -- --    Total Intake 2500 1710.5 4210.5 21 -- 21       Output    Urine  400  800 1200  --  -- --    Indwelling Cathether  -- -- --    Drains  --  270 270  --  -- --    Hemovac 1 -- 270 270 -- -- --    Blood  100  -- 100  --  -- --    Est. Blood Loss (mL) 100 -- 100 -- -- --    Total Output 500 1070 1570 -- -- --       Net I/O     2000 640.5 2640.5 21 -- 21            Intake/Output Summary (Last 24 hours) at 10/21/17 0939  Last data filed at 10/21/17 0724   Gross per 24 hour   Intake           4231.5 ml   Output             1570 ml   Net           2661.5 ml            • Influenza Vaccine Quad pf  0.5 mL Once   • hydrocodone/acetaminophen  1 Tab Q4HRS PRN    Or   • hydrocodone/acetaminophen  2 Tab Q4HRS PRN   • LR   Continuous   • amitriptyline  300 mg Nightly   • fluoxetine  40 mg DAILY   •  gabapentin  600 mg TID   • levothyroxine  150 mcg AM ES   • lisinopril  40 mg DAILY   • loratadine  10 mg DAILY   • metformin  1,000 mg BID WITH MEALS   • metoprolol SR  25 mg DAILY   • simvastatin  40 mg Nightly   • Pharmacy Consult Request  1 Each PRN   • MD ALERT...Do not administer NSAIDS or ASPIRIN unless ORDERED By Neurosurgery  1 Each PRN   • docusate sodium  100 mg BID   • senna-docusate  1 Tab Nightly   • senna-docusate  1 Tab Q24HRS PRN   • polyethylene glycol/lytes  1 Packet BID PRN   • magnesium hydroxide  30 mL QDAY PRN   • bisacodyl  10 mg Q24HRS PRN   • fleet  1 Each Once PRN   • Respiratory Care per Protocol   Continuous RT   • 0.9 % NaCl with KCl 20 mEq 1,000 mL   Continuous   • diphenhydrAMINE  25 mg Q6HRS PRN    Or   • diphenhydrAMINE  25 mg Q6HRS PRN   • ondansetron  4 mg Q4HRS PRN   • ondansetron  4 mg Q4HRS PRN   • promethazine  12.5-25 mg Q4HRS PRN   • promethazine  12.5-25 mg Q4HRS PRN   • prochlorperazine  5-10 mg Q4HRS PRN   • tizanidine  2 mg TID PRN   • labetalol  10 mg Q HOUR PRN   • hydrALAZINE  10 mg Q HOUR PRN   • clonidine  0.1 mg Q4HRS PRN   • benzocaine-menthol  1 Lozenge Q2HRS PRN   • calcium carbonate  500 mg BID   • insulin lispro  2-9 Units Q6HRS   • glucose 4 g  16 g Q15 MIN PRN    And   • dextrose 50%  25 mL Q15 MIN PRN       Assessment and Plan:  POD # 1  1.  Lumbar 2, 3 laminectomy  2.  bilaterlal Lumbar 2/3 foraminotomy  3. Bilateral pedicle screw placement L2, L3 ([employer], [employer])  4. Left OArm/intraoperative navigation  5. Autograft placement L2, L3 intertransverse process space  6. SSEP, MEP (neurophysiologic monitoring)  7. Bilateral L5/S1 laminoforaminotomy    Doing well  Pt/ot  D/c pca, begin norco  Watch hvac   Home likely monday  Prophylactic anticoagulation: no

## 2017-10-21 NOTE — THERAPY
"Occupational Therapy Evaluation completed.   Functional Status:  Pt s/p lumbar sx, no brace indicated. Pt performing ADLs with supervision/sba post education and training in lumbar spine precautions. Also, provided with lumbar spine handout for further reinforcement as needed. Pt reports can stay on 2nd level and only needs to manage 3STE, will have assist from sister and niece as needed with ADLs/IADLs and transportation  Plan of Care: Patient with no further skilled OT needs in the acute care setting at this time  Discharge Recommendations:  Equipment: No Equipment Needed. Post-acute therapy Discharge to home with outpatient or home health for additional skilled therapy services.    See \"Rehab Therapy-Acute\" Patient Summary Report for complete documentation.    "

## 2017-10-21 NOTE — THERAPY
"61 y/o female adm for chronic LBP s/P L2-3 laminectomy and L5 - S1 decompression.  Difiiculty with bed mobility , transfers and amb req use of FWW. Will jhon stair training as well.Will require acute PT for increasing functional mobility for safe DC.    Physical Therapy Evaluation completed.   Bed Mobility:  Supine to Sit: Stand by Assist  Transfers: Sit to Stand: Stand by Assist  Gait: Level Of Assist: Stand by Assist with Front-Wheel Walker x 50 ft.      Plan of Care: Will benefit from Physical Therapy 4 times per week  Discharge Recommendations: Equipment: Front-Wheel Walker. Post-acute therapy Discharge to home with outpatient or home health for additional skilled therapy services.    See \"Rehab Therapy-Acute\" Patient Summary Report for complete documentation.     "

## 2017-10-21 NOTE — PROGRESS NOTES
Received pt from PACU.  Oriented x 4 and She was able to walk with 2 person assist from gurney to bed.  Hemovac and hernandez in place.  PCA started in PACU and running appropriately.  No medications available from pharmacy at this time.  Admissions and medication tasks endorsed to NOC RN.

## 2017-10-21 NOTE — CARE PLAN
Problem: Safety  Goal: Will remain free from injury    Intervention: Provide assistance with mobility  Pt. Able to mobilize with fww and assistance of one. Pt. edcuated on use of call light if assistance is needed. Educated on risk for back strain when ambulating or moving in bed. Pt. Verbalized that she will call us to have her boosted in bed when needed.       Problem: Pain Management  Goal: Pain level will decrease to patient's comfort goal  Outcome: PROGRESSING AS EXPECTED  Pt.'s pain is a 6/10 at best so far. Pt. educated on use of the PCA, pt. Verbalized and demonstrated an understanding.

## 2017-10-22 LAB
ANION GAP SERPL CALC-SCNC: 12 MMOL/L (ref 0–11.9)
BUN SERPL-MCNC: 22 MG/DL (ref 8–22)
CALCIUM SERPL-MCNC: 8.6 MG/DL (ref 8.5–10.5)
CHLORIDE SERPL-SCNC: 100 MMOL/L (ref 96–112)
CO2 SERPL-SCNC: 22 MMOL/L (ref 20–33)
CREAT SERPL-MCNC: 1.21 MG/DL (ref 0.5–1.4)
EKG IMPRESSION: NORMAL
ERYTHROCYTE [DISTWIDTH] IN BLOOD BY AUTOMATED COUNT: 42.2 FL (ref 35.9–50)
GFR SERPL CREATININE-BSD FRML MDRD: 45 ML/MIN/1.73 M 2
GLUCOSE BLD-MCNC: 113 MG/DL (ref 65–99)
GLUCOSE BLD-MCNC: 129 MG/DL (ref 65–99)
GLUCOSE BLD-MCNC: 144 MG/DL (ref 65–99)
GLUCOSE BLD-MCNC: 207 MG/DL (ref 65–99)
GLUCOSE SERPL-MCNC: 167 MG/DL (ref 65–99)
HCT VFR BLD AUTO: 33.2 % (ref 37–47)
HGB BLD-MCNC: 10.9 G/DL (ref 12–16)
LACTATE BLD-SCNC: 1.9 MMOL/L (ref 0.5–2)
MCH RBC QN AUTO: 27.9 PG (ref 27–33)
MCHC RBC AUTO-ENTMCNC: 32.8 G/DL (ref 33.6–35)
MCV RBC AUTO: 85.1 FL (ref 81.4–97.8)
PLATELET # BLD AUTO: 226 K/UL (ref 164–446)
PMV BLD AUTO: 9.3 FL (ref 9–12.9)
POTASSIUM SERPL-SCNC: 3.5 MMOL/L (ref 3.6–5.5)
RBC # BLD AUTO: 3.9 M/UL (ref 4.2–5.4)
SODIUM SERPL-SCNC: 134 MMOL/L (ref 135–145)
TROPONIN I SERPL-MCNC: <0.01 NG/ML (ref 0–0.04)
WBC # BLD AUTO: 11.3 K/UL (ref 4.8–10.8)

## 2017-10-22 PROCEDURE — 700101 HCHG RX REV CODE 250: Performed by: CLINICAL NURSE SPECIALIST

## 2017-10-22 PROCEDURE — 80048 BASIC METABOLIC PNL TOTAL CA: CPT

## 2017-10-22 PROCEDURE — 700112 HCHG RX REV CODE 229: Performed by: CLINICAL NURSE SPECIALIST

## 2017-10-22 PROCEDURE — A9270 NON-COVERED ITEM OR SERVICE: HCPCS | Performed by: CLINICAL NURSE SPECIALIST

## 2017-10-22 PROCEDURE — 93010 ELECTROCARDIOGRAM REPORT: CPT | Performed by: INTERNAL MEDICINE

## 2017-10-22 PROCEDURE — 85027 COMPLETE CBC AUTOMATED: CPT

## 2017-10-22 PROCEDURE — 93005 ELECTROCARDIOGRAM TRACING: CPT | Performed by: CLINICAL NURSE SPECIALIST

## 2017-10-22 PROCEDURE — 36415 COLL VENOUS BLD VENIPUNCTURE: CPT

## 2017-10-22 PROCEDURE — 700111 HCHG RX REV CODE 636 W/ 250 OVERRIDE (IP): Performed by: CLINICAL NURSE SPECIALIST

## 2017-10-22 PROCEDURE — 82962 GLUCOSE BLOOD TEST: CPT

## 2017-10-22 PROCEDURE — 770001 HCHG ROOM/CARE - MED/SURG/GYN PRIV*

## 2017-10-22 PROCEDURE — 83605 ASSAY OF LACTIC ACID: CPT

## 2017-10-22 PROCEDURE — 84484 ASSAY OF TROPONIN QUANT: CPT

## 2017-10-22 PROCEDURE — 700102 HCHG RX REV CODE 250 W/ 637 OVERRIDE(OP): Performed by: CLINICAL NURSE SPECIALIST

## 2017-10-22 RX ORDER — HYDROCODONE BITARTRATE AND ACETAMINOPHEN 5; 325 MG/1; MG/1
1 TABLET ORAL EVERY 4 HOURS PRN
Status: DISCONTINUED | OUTPATIENT
Start: 2017-10-22 | End: 2017-10-23 | Stop reason: HOSPADM

## 2017-10-22 RX ORDER — NALOXONE HYDROCHLORIDE 0.4 MG/ML
INJECTION, SOLUTION INTRAMUSCULAR; INTRAVENOUS; SUBCUTANEOUS
Status: ACTIVE
Start: 2017-10-22 | End: 2017-10-22

## 2017-10-22 RX ORDER — NALOXONE HYDROCHLORIDE 0.4 MG/ML
0.4 INJECTION, SOLUTION INTRAMUSCULAR; INTRAVENOUS; SUBCUTANEOUS ONCE
Status: COMPLETED | OUTPATIENT
Start: 2017-10-22 | End: 2017-10-22

## 2017-10-22 RX ADMIN — DOCUSATE SODIUM 100 MG: 100 CAPSULE ORAL at 07:05

## 2017-10-22 RX ADMIN — ANTACID TABLETS 500 MG: 500 TABLET, CHEWABLE ORAL at 21:32

## 2017-10-22 RX ADMIN — METFORMIN HYDROCHLORIDE 1000 MG: 500 TABLET, FILM COATED ORAL at 16:24

## 2017-10-22 RX ADMIN — STANDARDIZED SENNA CONCENTRATE AND DOCUSATE SODIUM 1 TABLET: 8.6; 5 TABLET, FILM COATED ORAL at 09:08

## 2017-10-22 RX ADMIN — ANTACID TABLETS 500 MG: 500 TABLET, CHEWABLE ORAL at 09:06

## 2017-10-22 RX ADMIN — LISINOPRIL 40 MG: 20 TABLET ORAL at 09:06

## 2017-10-22 RX ADMIN — HYDROCODONE BITARTRATE AND ACETAMINOPHEN 1 TABLET: 5; 325 TABLET ORAL at 11:26

## 2017-10-22 RX ADMIN — LEVOTHYROXINE SODIUM 150 MCG: 150 TABLET ORAL at 07:05

## 2017-10-22 RX ADMIN — HYDROCODONE BITARTRATE AND ACETAMINOPHEN 1 TABLET: 5; 325 TABLET ORAL at 21:39

## 2017-10-22 RX ADMIN — GABAPENTIN 600 MG: 300 CAPSULE ORAL at 14:24

## 2017-10-22 RX ADMIN — INSULIN LISPRO 3 UNITS: 100 INJECTION, SOLUTION INTRAVENOUS; SUBCUTANEOUS at 01:34

## 2017-10-22 RX ADMIN — POTASSIUM CHLORIDE AND SODIUM CHLORIDE: 900; 150 INJECTION, SOLUTION INTRAVENOUS at 04:15

## 2017-10-22 RX ADMIN — SIMVASTATIN 40 MG: 20 TABLET, FILM COATED ORAL at 21:33

## 2017-10-22 RX ADMIN — FLUOXETINE HYDROCHLORIDE 40 MG: 20 CAPSULE ORAL at 09:09

## 2017-10-22 RX ADMIN — AMITRIPTYLINE HYDROCHLORIDE 300 MG: 150 TABLET, FILM COATED ORAL at 21:39

## 2017-10-22 RX ADMIN — HYDROCODONE BITARTRATE AND ACETAMINOPHEN 1 TABLET: 5; 325 TABLET ORAL at 16:19

## 2017-10-22 RX ADMIN — MAGNESIUM HYDROXIDE 30 ML: 400 SUSPENSION ORAL at 17:47

## 2017-10-22 RX ADMIN — METFORMIN HYDROCHLORIDE 1000 MG: 500 TABLET, FILM COATED ORAL at 07:05

## 2017-10-22 RX ADMIN — TIZANIDINE 2 MG: 4 TABLET ORAL at 14:28

## 2017-10-22 RX ADMIN — STANDARDIZED SENNA CONCENTRATE AND DOCUSATE SODIUM 1 TABLET: 8.6; 5 TABLET, FILM COATED ORAL at 21:33

## 2017-10-22 RX ADMIN — GABAPENTIN 600 MG: 300 CAPSULE ORAL at 09:07

## 2017-10-22 RX ADMIN — DOCUSATE SODIUM 100 MG: 100 CAPSULE ORAL at 16:21

## 2017-10-22 RX ADMIN — METOPROLOL SUCCINATE 25 MG: 25 TABLET, EXTENDED RELEASE ORAL at 09:04

## 2017-10-22 RX ADMIN — LORATADINE 10 MG: 10 TABLET ORAL at 09:04

## 2017-10-22 RX ADMIN — NALOXONE HYDROCHLORIDE 0.4 MG: 0.4 INJECTION, SOLUTION INTRAMUSCULAR; INTRAVENOUS; SUBCUTANEOUS at 01:03

## 2017-10-22 RX ADMIN — GABAPENTIN 600 MG: 300 CAPSULE ORAL at 21:32

## 2017-10-22 RX ADMIN — HYDROCODONE BITARTRATE AND ACETAMINOPHEN 1 TABLET: 5; 325 TABLET ORAL at 07:09

## 2017-10-22 ASSESSMENT — PATIENT HEALTH QUESTIONNAIRE - PHQ9
SUM OF ALL RESPONSES TO PHQ9 QUESTIONS 1 AND 2: 0
2. FEELING DOWN, DEPRESSED, IRRITABLE, OR HOPELESS: NOT AT ALL
1. LITTLE INTEREST OR PLEASURE IN DOING THINGS: NOT AT ALL
SUM OF ALL RESPONSES TO PHQ QUESTIONS 1-9: 0

## 2017-10-22 ASSESSMENT — PAIN SCALES - GENERAL
PAINLEVEL_OUTOF10: 5
PAINLEVEL_OUTOF10: 6
PAINLEVEL_OUTOF10: 5
PAINLEVEL_OUTOF10: 9
PAINLEVEL_OUTOF10: 8
PAINLEVEL_OUTOF10: 7

## 2017-10-22 NOTE — PROGRESS NOTES
Received report from night RN at bedside. Pt is calm and alert, shows no signs of distress. Pt is breathing normally. No chest pain. Surgical pain on lower back rated at 8/10. Medicated per MAR. Drain output WNL. Pt voiding well.  No needs at this time. POC discussed. Bed in low position call bell within reach, half side rails up, Bed alarm on. Pt resting quietly. Will continue to assess.

## 2017-10-22 NOTE — CARE PLAN
Problem: Safety  Goal: Will remain free from falls  Outcome: PROGRESSING AS EXPECTED    Patient assessed for risk for injury/fall. Patient Alert and oriented.   Patient educated on risks and interventions in place   Patient verbalized understanding.  Patient remains free from injury/fall    Bed at lowest level with rails up, call light and belongings within reach, patient calls for assistance, safety socks on, floor clear, hourly rounds in place.     Problem: Infection  Goal: Will remain free from infection  Outcome: PROGRESSING AS EXPECTED    Patient assessed for signs of infection. Patient is afebrile. Vital signs monitored. Remains at baseline, Laboratory values WNL. Educated patient on signs and symptoms and to ensure that staff implements hand hygiene. IV line and Drains negative for signs of infection. Patient AO4, patient breathing normally with clear sounds, voids well with no retention or signs of infection, Skin and incision intact with no signs of infection.

## 2017-10-22 NOTE — PROGRESS NOTES
Patient change in mentation status noted at 0030. Patient confused with noted hallucinations. Blood pressure declining. Called RRT and Dr. Mehta office.    Received call back from Dilan Jones. Ordered EKG STAT and lab draw for troponin in addition to the current lab order. Telephone read back order for Narcan.    Patient mental status improved after Narcan. Blood pressure and oxygen saturation stabilized. Will continue to monitor, fluids will continue with  monitoring.

## 2017-10-22 NOTE — PROGRESS NOTES
Neurosurgery Progress Note    Subjective:    Rapid response early this am - narcan given.   C/o back pain, no leg pain  Had urinary incontinence this am - full bladder  +flatus, no bm    Exam:  LE Str 5/5  inc c/d flat w/ hvac    BP  Min: 91/40  Max: 126/52  Pulse  Av.7  Min: 70  Max: 94  Resp  Av  Min: 16  Max: 24  Temp  Av.9 °C (98.5 °F)  Min: 36.8 °C (98.3 °F)  Max: 37.1 °C (98.8 °F)  SpO2  Av %  Min: 91 %  Max: 96 %    No Data Recorded    Recent Labs      10/21/17   0143  10/22/17   0059   WBC  11.2*  11.3*   RBC  4.20  3.90*   HEMOGLOBIN  11.9*  10.9*   HEMATOCRIT  36.4*  33.2*   MCV  86.7  85.1   MCH  28.3  27.9   MCHC  32.7*  32.8*   RDW  41.3  42.2   PLATELETCT  252  226   MPV  9.6  9.3     Recent Labs      10/21/17   0143  10/22/17   0059   SODIUM  137  134*   POTASSIUM  4.2  3.5*   CHLORIDE  104  100   CO2  28  22   GLUCOSE  126*  167*   BUN  18  22               Intake/Output       10/21/17 0700 - 10/22/17 0659 10/22/17 0700 - 10/23/17 0659      6773-8774 8116-2260 Total  8635-3904 Total       Intake    P.O.  --  -- --  500  -- 500    P.O. -- -- -- 500 -- 500    I.V.    500  -- 500    PCA End of Shift Total Volume (ml)  -- -- --    IV Volume (NS20K) -- -- -- 500 -- 500    Total Intake  1000 -- 1000       Output    Urine  --  -- --  --  -- --    Number of Times Voided -- 3 x 3 x 2 x -- 2 x    Drains  110  180 290  --  -- --    Hemovac 1 110 180 290 -- -- --    Total Output 110 180 290 -- -- --       Net I/O     -89 -180 -269 1000 -- 1000            Intake/Output Summary (Last 24 hours) at 10/22/17 1011  Last data filed at 10/22/17 0700   Gross per 24 hour   Intake             1000 ml   Output              290 ml   Net              710 ml            • naloxone      • hydrocodone-acetaminophen  1 Tab Q4HRS PRN   • LR   Continuous   • amitriptyline  300 mg Nightly   • fluoxetine  40 mg DAILY   • gabapentin  600 mg TID   • levothyroxine  150 mcg AM ES   •  lisinopril  40 mg DAILY   • loratadine  10 mg DAILY   • metformin  1,000 mg BID WITH MEALS   • metoprolol SR  25 mg DAILY   • simvastatin  40 mg Nightly   • Pharmacy Consult Request  1 Each PRN   • MD ALERT...Do not administer NSAIDS or ASPIRIN unless ORDERED By Neurosurgery  1 Each PRN   • docusate sodium  100 mg BID   • senna-docusate  1 Tab Nightly   • senna-docusate  1 Tab Q24HRS PRN   • polyethylene glycol/lytes  1 Packet BID PRN   • magnesium hydroxide  30 mL QDAY PRN   • bisacodyl  10 mg Q24HRS PRN   • fleet  1 Each Once PRN   • Respiratory Care per Protocol   Continuous RT   • 0.9 % NaCl with KCl 20 mEq 1,000 mL   Continuous   • diphenhydrAMINE  25 mg Q6HRS PRN    Or   • diphenhydrAMINE  25 mg Q6HRS PRN   • ondansetron  4 mg Q4HRS PRN   • ondansetron  4 mg Q4HRS PRN   • promethazine  12.5-25 mg Q4HRS PRN   • promethazine  12.5-25 mg Q4HRS PRN   • prochlorperazine  5-10 mg Q4HRS PRN   • tizanidine  2 mg TID PRN   • labetalol  10 mg Q HOUR PRN   • hydrALAZINE  10 mg Q HOUR PRN   • clonidine  0.1 mg Q4HRS PRN   • benzocaine-menthol  1 Lozenge Q2HRS PRN   • calcium carbonate  500 mg BID   • insulin lispro  2-9 Units Q6HRS   • glucose 4 g  16 g Q15 MIN PRN    And   • dextrose 50%  25 mL Q15 MIN PRN     hvac 90 cc/8hrs    Assessment and Plan:  POD # 2  Lumbar 2, 3 laminectomy/ fusion  Bilateral L5/S1 laminoforaminotomy  Pt/ot  Prophylactic anticoagulation: no  Bowel/bladder protocol  Cont hvac  Has fww at home  D/w pt and RN - will carefully medicate pt for pain  ? Home tomorrow

## 2017-10-22 NOTE — PROGRESS NOTES
Patient alert and oriented. Saturating at 94% on 3 L NC, skin intact. No signs of any distress. POC discussed, no further needs at this time, call light within reach, bed alarm armed.

## 2017-10-22 NOTE — PROGRESS NOTES
Bed alarm refused. Educated patient on safety and the importance of calling for assistance. Patient verbalized understanding. Patient needs met at this time. Call light and belongings within reach.

## 2017-10-23 VITALS
OXYGEN SATURATION: 97 % | SYSTOLIC BLOOD PRESSURE: 120 MMHG | HEIGHT: 68 IN | WEIGHT: 251.32 LBS | BODY MASS INDEX: 38.09 KG/M2 | DIASTOLIC BLOOD PRESSURE: 50 MMHG | RESPIRATION RATE: 16 BRPM | HEART RATE: 71 BPM | TEMPERATURE: 97.1 F

## 2017-10-23 LAB — GLUCOSE BLD-MCNC: 137 MG/DL (ref 65–99)

## 2017-10-23 PROCEDURE — 700102 HCHG RX REV CODE 250 W/ 637 OVERRIDE(OP): Performed by: CLINICAL NURSE SPECIALIST

## 2017-10-23 PROCEDURE — 82962 GLUCOSE BLOOD TEST: CPT

## 2017-10-23 PROCEDURE — A9270 NON-COVERED ITEM OR SERVICE: HCPCS | Performed by: CLINICAL NURSE SPECIALIST

## 2017-10-23 RX ORDER — TIZANIDINE 2 MG/1
2 TABLET ORAL 3 TIMES DAILY PRN
Qty: 40 TAB | Refills: 0 | Status: SHIPPED | OUTPATIENT
Start: 2017-10-23 | End: 2019-02-28 | Stop reason: CLARIF

## 2017-10-23 RX ORDER — HYDROCODONE BITARTRATE AND ACETAMINOPHEN 5; 325 MG/1; MG/1
1 TABLET ORAL EVERY 4 HOURS PRN
Qty: 40 TAB | Refills: 0 | Status: SHIPPED | OUTPATIENT
Start: 2017-10-23 | End: 2019-02-28 | Stop reason: CLARIF

## 2017-10-23 RX ORDER — AMOXICILLIN 250 MG
1 CAPSULE ORAL
Qty: 30 TAB | Refills: 0 | COMMUNITY
Start: 2017-10-23 | End: 2019-02-28 | Stop reason: CLARIF

## 2017-10-23 RX ADMIN — TIZANIDINE 2 MG: 4 TABLET ORAL at 09:59

## 2017-10-23 RX ADMIN — GABAPENTIN 600 MG: 300 CAPSULE ORAL at 08:27

## 2017-10-23 RX ADMIN — LEVOTHYROXINE SODIUM 150 MCG: 150 TABLET ORAL at 04:28

## 2017-10-23 RX ADMIN — LORATADINE 10 MG: 10 TABLET ORAL at 08:33

## 2017-10-23 RX ADMIN — METOPROLOL SUCCINATE 25 MG: 25 TABLET, EXTENDED RELEASE ORAL at 08:27

## 2017-10-23 RX ADMIN — ANTACID TABLETS 500 MG: 500 TABLET, CHEWABLE ORAL at 09:00

## 2017-10-23 RX ADMIN — LISINOPRIL 40 MG: 20 TABLET ORAL at 08:27

## 2017-10-23 RX ADMIN — FLUOXETINE HYDROCHLORIDE 40 MG: 20 CAPSULE ORAL at 08:27

## 2017-10-23 RX ADMIN — HYDROCODONE BITARTRATE AND ACETAMINOPHEN 1 TABLET: 5; 325 TABLET ORAL at 08:27

## 2017-10-23 RX ADMIN — METFORMIN HYDROCHLORIDE 1000 MG: 500 TABLET, FILM COATED ORAL at 08:27

## 2017-10-23 RX ADMIN — TIZANIDINE 2 MG: 4 TABLET ORAL at 00:45

## 2017-10-23 ASSESSMENT — LIFESTYLE VARIABLES: EVER_SMOKED: NEVER

## 2017-10-23 NOTE — DISCHARGE INSTRUCTIONS
Discharge Instructions    Discharged to home by car with relative. Discharged via wheelchair, hospital escort: Yes.  Special equipment needed: Not Applicable    Be sure to schedule a follow-up appointment with your primary care doctor or any specialists as instructed.     Discharge Plan:   Ok to shower, pat incision dry, leave open to air- no dressing   No Aspirin or non-steroidal anti-inflammatory medications (aleve, motrin, ibuprofen, celebrex)   No driving for 2 weeks/ No driving while on narcotic medication   Over the counter stool softeners daily while on narcotics   No lifting greater than 10 pounds, no repetitive bending or twisting   Follow up at [facility] 2 weeks after surgery    Smoking Cessation Offered: Patient Refused  Influenza Vaccine Indication: Indicated: 9 to 64 years of age  Influenza Vaccine Given - only chart on this line when given: Influenza Vaccine Given (See MAR)    I understand that a diet low in cholesterol, fat, and sodium is recommended for good health. Unless I have been given specific instructions below for another diet, I accept this instruction as my diet prescription.   Other diet:     Special Instructions: None    · Is patient discharged on Warfarin / Coumadin?   No     · Is patient Post Blood Transfusion?  No    Depression / Suicide Risk    As you are discharged from this [facility] facility, it is important to learn how to keep safe from harming yourself.    Recognize the warning signs:  · Abrupt changes in personality, positive or negative- including increase in energy   · Giving away possessions  · Change in eating patterns- significant weight changes-  positive or negative  · Change in sleeping patterns- unable to sleep or sleeping all the time   · Unwillingness or inability to communicate  · Depression  · Unusual sadness, discouragement and loneliness  · Talk of wanting to die  · Neglect of personal appearance   · Rebelliousness- reckless behavior  · Withdrawal  from people/activities they love  · Confusion- inability to concentrate     If you or a loved one observes any of these behaviors or has concerns about self-harm, here's what you can do:  · Talk about it- your feelings and reasons for harming yourself  · Remove any means that you might use to hurt yourself (examples: pills, rope, extension cords, firearm)  · Get professional help from the community (Mental Health, Substance Abuse, psychological counseling)  · Do not be alone:Call your Safe Contact- someone whom you trust who will be there for you.  · Call your local CRISIS HOTLINE 251-3671 or 638-165-8212  · Call your local Children's Mobile Crisis Response Team [place] (290) 525-5968 or www.Arav  · Call the toll free National Suicide Prevention Hotlines   · National Suicide Prevention Lifeline 269-638-ERYE (8997)  · National Hope Line Network 800-SUICIDE (102-8982)

## 2017-10-23 NOTE — PROGRESS NOTES
Neurosurgery Progress Note    Subjective:    back pain controlled, no leg pain  Voiding, amb  +flatus, no bm    Exam:  LE Str 5/5  inc c/d flat w/ hvac 50    BP  Min: 92/49  Max: 116/43  Pulse  Av.5  Min: 71  Max: 81  Resp  Av.5  Min: 18  Max: 20  Temp  Av.7 °C (98.1 °F)  Min: 36.3 °C (97.4 °F)  Max: 37 °C (98.6 °F)  SpO2  Av.3 %  Min: 92 %  Max: 94 %    No Data Recorded    Recent Labs      10/21/17   0143  10/22/17   0059   WBC  11.2*  11.3*   RBC  4.20  3.90*   HEMOGLOBIN  11.9*  10.9*   HEMATOCRIT  36.4*  33.2*   MCV  86.7  85.1   MCH  28.3  27.9   MCHC  32.7*  32.8*   RDW  41.3  42.2   PLATELETCT  252  226   MPV  9.6  9.3     Recent Labs      10/21/17   0143  10/22/17   0059   SODIUM  137  134*   POTASSIUM  4.2  3.5*   CHLORIDE  104  100   CO2  28  22   GLUCOSE  126*  167*   BUN  18  22               Intake/Output       10/22/17 0700 - 10/23/17 0659 10/23/17 0700 - 10/24/17 0659       Total 1900-0659 Total       Intake    P.O.  500  600 1100  --  -- --    P.O.  -- -- --    I.V.  500  -- 500  --  -- --    IV Volume (NS20K) 500 -- 500 -- -- --    Total Intake 3638 296 9423 -- -- --       Output    Urine  --  -- --  --  -- --    Number of Times Voided 2 x 4 x 6 x -- -- --    Drains  80  100 180  --  -- --    Hemovac 1 80 100 180 -- -- --    Total Output 80 100 180 -- -- --       Net I/O      -- -- --            Intake/Output Summary (Last 24 hours) at 10/23/17 0727  Last data filed at 10/23/17 0600   Gross per 24 hour   Intake              600 ml   Output              180 ml   Net              420 ml            • hydrocodone-acetaminophen  1 Tab Q4HRS PRN   • LR   Continuous   • amitriptyline  300 mg Nightly   • fluoxetine  40 mg DAILY   • gabapentin  600 mg TID   • levothyroxine  150 mcg AM ES   • lisinopril  40 mg DAILY   • loratadine  10 mg DAILY   • metformin  1,000 mg BID WITH MEALS   • metoprolol SR  25 mg DAILY   • simvastatin  40 mg  Nightly   • Pharmacy Consult Request  1 Each PRN   • MD ALERT...Do not administer NSAIDS or ASPIRIN unless ORDERED By Neurosurgery  1 Each PRN   • docusate sodium  100 mg BID   • senna-docusate  1 Tab Nightly   • senna-docusate  1 Tab Q24HRS PRN   • polyethylene glycol/lytes  1 Packet BID PRN   • magnesium hydroxide  30 mL QDAY PRN   • bisacodyl  10 mg Q24HRS PRN   • fleet  1 Each Once PRN   • Respiratory Care per Protocol   Continuous RT   • 0.9 % NaCl with KCl 20 mEq 1,000 mL   Continuous   • diphenhydrAMINE  25 mg Q6HRS PRN    Or   • diphenhydrAMINE  25 mg Q6HRS PRN   • ondansetron  4 mg Q4HRS PRN   • ondansetron  4 mg Q4HRS PRN   • promethazine  12.5-25 mg Q4HRS PRN   • promethazine  12.5-25 mg Q4HRS PRN   • prochlorperazine  5-10 mg Q4HRS PRN   • tizanidine  2 mg TID PRN   • labetalol  10 mg Q HOUR PRN   • hydrALAZINE  10 mg Q HOUR PRN   • clonidine  0.1 mg Q4HRS PRN   • benzocaine-menthol  1 Lozenge Q2HRS PRN   • calcium carbonate  500 mg BID   • insulin lispro  2-9 Units Q6HRS   • glucose 4 g  16 g Q15 MIN PRN    And   • dextrose 50%  25 mL Q15 MIN PRN     hvac 90 cc/8hrs    Assessment and Plan:  POD # 3  Lumbar 2, 3 laminectomy/ fusion  Bilateral L5/S1 laminoforaminotomy  Pt/ot  Prophylactic anticoagulation: no  Bowel/bladder protocol  D/c hvac  Has fww at home  discharge

## 2017-10-23 NOTE — PROGRESS NOTES
D/C instructions provided to pt, verbally understood, no questions asked. RX's provided as well. HV and IV removed. Cath tip intact. Pt tolerated well. Pt medicated with pain meds and muscle relaxer prior to ride home per request. Pt had a friend come as a ride home. Instructed pt not to drive while taking narcotics and not to drink alcohol as well. Monitor for s/s of infection at incisional site. Call MD or return to hospital for CP, SOB, s/s of infection.

## 2017-10-23 NOTE — CARE PLAN
Problem: Safety  Goal: Will remain free from injury  Outcome: PROGRESSING AS EXPECTED  Verbalizes will use call light to call light for needs and bathroom.

## 2017-10-23 NOTE — CARE PLAN
Problem: Safety  Goal: Will remain free from injury  Outcome: PROGRESSING AS EXPECTED  If feeling dizzy will notify nursing.

## 2017-10-23 NOTE — DISCHARGE SUMMARY
DATE OF ADMISSION:  10/20/2017.    DATE OF DISCHARGE:  10/23/2017.    ADMITTING DIAGNOSES:    1.  Lumbar stenosis.  2.  Neurogenic claudication.  3.  Lumbar radiculopathy.  4.  Spondylosis.  5.  Back pain.    HISTORY OF PRESENT ILLNESS:  Please refer to the previously dictated history   and physical for complete details.  The patient is a 62-year-old patient of   Dr. Mehta who had the above findings.  Dr. Mehta discussed surgery and the   risks and benefits and the patient wished to proceed.    COURSE OF HOSPITALIZATION:  The patient was admitted to [facility] on 10/20/2017   and on that date, she was brought to the operating room where she underwent an   L2-L3 laminectomy, bilateral foraminotomies, pedicle screw fixation, and   posterolateral arthrodesis and bilateral L5-S1 laminoforaminotomies with Dr. Mehta.    Postoperatively, the patient has done well.  She was over sedated on   postoperative day #1 and received Narcan and her medications were decreased.    She reports good pain control.  She denies leg symptoms.  Her strength is 5/5.    She is ambulatory with a front-wheel walker, voiding, eating, passing   flatus.  Her incision is clean, dry, flat with staples.  Her Hemovac will be   removed and she will be discharged home today.  She has a front wheel walker   at home.    DISCHARGE INSTRUCTIONS:  Provided to the patient on discharge.    DISCHARGE MEDICATIONS:  1.  Norco 5/325 one every 4 hours p.r.n. pain, #40.  2.  Zanaflex 2 mg every 8 hours p.r.n. spasm, #40.    IMPRESSION AND PLAN:  1.  Admitting diagnoses:  Lumbar stenosis, neurogenic claudication and lumbar   radiculopathy, spondylosis and back pain.  2.  Operation performed:  L2-L3 laminectomy, bilateral foraminotomies L2-L3,   pedicle screw fixation and posterolateral arthrodesis and bilateral L5-S1   laminoforaminotomies with Dr. Mehta on 10/20/2017.  3.  The patient is discharged in stable medical condition with the   instructions and  medications as outlined above.       ____________________________________     DESTINY DANIELSON    DD:  10/23/2017 07:34:14  DT:  10/23/2017 07:51:20    D#:  [id number]  Job#:  [id number]

## 2017-10-23 NOTE — PROGRESS NOTES
Patient ambulating well in the daniels and bathroom. Pain less today and 5 mg working well. No sedation or mental status changes.

## 2019-02-28 ENCOUNTER — HOSPITAL ENCOUNTER (INPATIENT)
Facility: MEDICAL CENTER | Age: 64
LOS: 15 days | DRG: 871 | End: 2019-03-15
Attending: EMERGENCY MEDICINE | Admitting: HOSPITALIST
Payer: MEDICARE

## 2019-02-28 ENCOUNTER — APPOINTMENT (OUTPATIENT)
Dept: RADIOLOGY | Facility: MEDICAL CENTER | Age: 64
DRG: 871 | End: 2019-02-28
Attending: EMERGENCY MEDICINE
Payer: MEDICARE

## 2019-02-28 ENCOUNTER — APPOINTMENT (OUTPATIENT)
Dept: RADIOLOGY | Facility: MEDICAL CENTER | Age: 64
DRG: 871 | End: 2019-02-28
Attending: INTERNAL MEDICINE
Payer: MEDICARE

## 2019-02-28 DIAGNOSIS — M54.5 CHRONIC LOW BACK PAIN, UNSPECIFIED BACK PAIN LATERALITY, WITH SCIATICA PRESENCE UNSPECIFIED: ICD-10-CM

## 2019-02-28 DIAGNOSIS — R65.21 SEPTIC SHOCK (HCC): ICD-10-CM

## 2019-02-28 DIAGNOSIS — G89.29 CHRONIC LOW BACK PAIN, UNSPECIFIED BACK PAIN LATERALITY, WITH SCIATICA PRESENCE UNSPECIFIED: ICD-10-CM

## 2019-02-28 DIAGNOSIS — A41.9 SEPTIC SHOCK (HCC): ICD-10-CM

## 2019-02-28 PROBLEM — E87.20 LACTIC ACIDOSIS: Status: ACTIVE | Noted: 2019-02-28

## 2019-02-28 PROBLEM — K59.00 CN (CONSTIPATION): Status: ACTIVE | Noted: 2019-02-28

## 2019-02-28 PROBLEM — E11.9 DM (DIABETES MELLITUS) (HCC): Status: ACTIVE | Noted: 2019-02-28

## 2019-02-28 PROBLEM — I95.9 HYPOTENSION: Status: ACTIVE | Noted: 2019-02-28

## 2019-02-28 PROBLEM — R79.89 ABNORMAL LFTS: Status: ACTIVE | Noted: 2019-02-28

## 2019-02-28 PROBLEM — N39.0 URINARY TRACT INFECTION: Status: ACTIVE | Noted: 2019-02-28

## 2019-02-28 PROBLEM — F32.A DEPRESSION: Status: ACTIVE | Noted: 2019-02-28

## 2019-02-28 PROBLEM — N17.9 AKI (ACUTE KIDNEY INJURY) (HCC): Status: ACTIVE | Noted: 2019-02-28

## 2019-02-28 LAB
ABO GROUP BLD: NORMAL
ABO GROUP BLD: NORMAL
ALBUMIN SERPL BCP-MCNC: 3.6 G/DL (ref 3.2–4.9)
ALBUMIN/GLOB SERPL: 1.2 G/DL
ALP SERPL-CCNC: 283 U/L (ref 30–99)
ALT SERPL-CCNC: 24 U/L (ref 2–50)
ANION GAP SERPL CALC-SCNC: 18 MMOL/L (ref 0–11.9)
APPEARANCE UR: ABNORMAL
APTT PPP: 39.5 SEC (ref 24.7–36)
AST SERPL-CCNC: 49 U/L (ref 12–45)
BACTERIA #/AREA URNS HPF: ABNORMAL /HPF
BASOPHILS # BLD AUTO: 0.4 % (ref 0–1.8)
BASOPHILS # BLD AUTO: 0.8 % (ref 0–1.8)
BASOPHILS # BLD: 0.05 K/UL (ref 0–0.12)
BASOPHILS # BLD: 0.14 K/UL (ref 0–0.12)
BILIRUB SERPL-MCNC: 0.6 MG/DL (ref 0.1–1.5)
BILIRUB UR QL STRIP.AUTO: ABNORMAL
BLD GP AB SCN SERPL QL: NORMAL
BNP SERPL-MCNC: 13 PG/ML (ref 0–100)
BUN SERPL-MCNC: 37 MG/DL (ref 8–22)
CALCIUM SERPL-MCNC: 8.3 MG/DL (ref 8.5–10.5)
CHLORIDE SERPL-SCNC: 105 MMOL/L (ref 96–112)
CO2 SERPL-SCNC: 14 MMOL/L (ref 20–33)
COLOR UR: ABNORMAL
CREAT SERPL-MCNC: 1.83 MG/DL (ref 0.5–1.4)
EKG IMPRESSION: NORMAL
EKG IMPRESSION: NORMAL
EOSINOPHIL # BLD AUTO: 0.01 K/UL (ref 0–0.51)
EOSINOPHIL # BLD AUTO: 0.13 K/UL (ref 0–0.51)
EOSINOPHIL NFR BLD: 0.1 % (ref 0–6.9)
EOSINOPHIL NFR BLD: 0.7 % (ref 0–6.9)
EPI CELLS #/AREA URNS HPF: ABNORMAL /HPF
ERYTHROCYTE [DISTWIDTH] IN BLOOD BY AUTOMATED COUNT: 47.3 FL (ref 35.9–50)
ERYTHROCYTE [DISTWIDTH] IN BLOOD BY AUTOMATED COUNT: 47.8 FL (ref 35.9–50)
GLOBULIN SER CALC-MCNC: 3 G/DL (ref 1.9–3.5)
GLUCOSE SERPL-MCNC: 291 MG/DL (ref 65–99)
GLUCOSE UR STRIP.AUTO-MCNC: NEGATIVE MG/DL
HCG SERPL QL: NEGATIVE
HCT VFR BLD AUTO: 44.7 % (ref 37–47)
HCT VFR BLD AUTO: 49.3 % (ref 37–47)
HGB BLD-MCNC: 13.9 G/DL (ref 12–16)
HGB BLD-MCNC: 15.8 G/DL (ref 12–16)
HYALINE CASTS #/AREA URNS LPF: ABNORMAL /LPF
IMM GRANULOCYTES # BLD AUTO: 0.13 K/UL (ref 0–0.11)
IMM GRANULOCYTES # BLD AUTO: 0.16 K/UL (ref 0–0.11)
IMM GRANULOCYTES NFR BLD AUTO: 0.9 % (ref 0–0.9)
IMM GRANULOCYTES NFR BLD AUTO: 0.9 % (ref 0–0.9)
INR PPP: 1.45 (ref 0.87–1.13)
INR PPP: 1.62 (ref 0.87–1.13)
KETONES UR STRIP.AUTO-MCNC: ABNORMAL MG/DL
LACTATE BLD-SCNC: 3.7 MMOL/L (ref 0.5–2)
LACTATE BLD-SCNC: 5.6 MMOL/L (ref 0.5–2)
LEUKOCYTE ESTERASE UR QL STRIP.AUTO: ABNORMAL
LYMPHOCYTES # BLD AUTO: 1.53 K/UL (ref 1–4.8)
LYMPHOCYTES # BLD AUTO: 3.24 K/UL (ref 1–4.8)
LYMPHOCYTES NFR BLD: 11 % (ref 22–41)
LYMPHOCYTES NFR BLD: 17.5 % (ref 22–41)
MCH RBC QN AUTO: 29.1 PG (ref 27–33)
MCH RBC QN AUTO: 29.5 PG (ref 27–33)
MCHC RBC AUTO-ENTMCNC: 31.1 G/DL (ref 33.6–35)
MCHC RBC AUTO-ENTMCNC: 32 G/DL (ref 33.6–35)
MCV RBC AUTO: 92 FL (ref 81.4–97.8)
MCV RBC AUTO: 93.7 FL (ref 81.4–97.8)
MICRO URNS: ABNORMAL
MONOCYTES # BLD AUTO: 0.42 K/UL (ref 0–0.85)
MONOCYTES # BLD AUTO: 0.45 K/UL (ref 0–0.85)
MONOCYTES NFR BLD AUTO: 2.3 % (ref 0–13.4)
MONOCYTES NFR BLD AUTO: 3.2 % (ref 0–13.4)
NEUTROPHILS # BLD AUTO: 11.7 K/UL (ref 2–7.15)
NEUTROPHILS # BLD AUTO: 14.47 K/UL (ref 2–7.15)
NEUTROPHILS NFR BLD: 77.8 % (ref 44–72)
NEUTROPHILS NFR BLD: 84.4 % (ref 44–72)
NITRITE UR QL STRIP.AUTO: NEGATIVE
NRBC # BLD AUTO: 0 K/UL
NRBC # BLD AUTO: 0.02 K/UL
NRBC BLD-RTO: 0 /100 WBC
NRBC BLD-RTO: 0.1 /100 WBC
PH UR STRIP.AUTO: 5 [PH]
PLATELET # BLD AUTO: 267 K/UL (ref 164–446)
PLATELET # BLD AUTO: 288 K/UL (ref 164–446)
PMV BLD AUTO: 9.3 FL (ref 9–12.9)
PMV BLD AUTO: 9.5 FL (ref 9–12.9)
POTASSIUM SERPL-SCNC: 4.9 MMOL/L (ref 3.6–5.5)
PROT SERPL-MCNC: 6.6 G/DL (ref 6–8.2)
PROT UR QL STRIP: 300 MG/DL
PROTHROMBIN TIME: 17.8 SEC (ref 12–14.6)
PROTHROMBIN TIME: 19.3 SEC (ref 12–14.6)
RBC # BLD AUTO: 4.77 M/UL (ref 4.2–5.4)
RBC # BLD AUTO: 5.36 M/UL (ref 4.2–5.4)
RBC # URNS HPF: ABNORMAL /HPF
RBC UR QL AUTO: ABNORMAL
RH BLD: NORMAL
RH BLD: NORMAL
SODIUM SERPL-SCNC: 137 MMOL/L (ref 135–145)
SP GR UR STRIP.AUTO: 1.03
TROPONIN I SERPL-MCNC: <0.01 NG/ML (ref 0–0.04)
UROBILINOGEN UR STRIP.AUTO-MCNC: 1 MG/DL
WBC # BLD AUTO: 13.9 K/UL (ref 4.8–10.8)
WBC # BLD AUTO: 18.6 K/UL (ref 4.8–10.8)
WBC #/AREA URNS HPF: ABNORMAL /HPF

## 2019-02-28 PROCEDURE — 700117 HCHG RX CONTRAST REV CODE 255: Performed by: EMERGENCY MEDICINE

## 2019-02-28 PROCEDURE — 99292 CRITICAL CARE ADDL 30 MIN: CPT | Performed by: INTERNAL MEDICINE

## 2019-02-28 PROCEDURE — 303105 HCHG CATHETER EXTRA

## 2019-02-28 PROCEDURE — 99291 CRITICAL CARE FIRST HOUR: CPT

## 2019-02-28 PROCEDURE — 85730 THROMBOPLASTIN TIME PARTIAL: CPT

## 2019-02-28 PROCEDURE — 700101 HCHG RX REV CODE 250: Performed by: HOSPITALIST

## 2019-02-28 PROCEDURE — 87040 BLOOD CULTURE FOR BACTERIA: CPT | Mod: 91

## 2019-02-28 PROCEDURE — 85025 COMPLETE CBC W/AUTO DIFF WBC: CPT | Mod: 91

## 2019-02-28 PROCEDURE — 71045 X-RAY EXAM CHEST 1 VIEW: CPT

## 2019-02-28 PROCEDURE — 83690 ASSAY OF LIPASE: CPT

## 2019-02-28 PROCEDURE — 94760 N-INVAS EAR/PLS OXIMETRY 1: CPT

## 2019-02-28 PROCEDURE — 36556 INSERT NON-TUNNEL CV CATH: CPT

## 2019-02-28 PROCEDURE — 99291 CRITICAL CARE FIRST HOUR: CPT | Performed by: HOSPITALIST

## 2019-02-28 PROCEDURE — 87086 URINE CULTURE/COLONY COUNT: CPT

## 2019-02-28 PROCEDURE — B548ZZA ULTRASONOGRAPHY OF SUPERIOR VENA CAVA, GUIDANCE: ICD-10-PCS | Performed by: EMERGENCY MEDICINE

## 2019-02-28 PROCEDURE — 700105 HCHG RX REV CODE 258: Performed by: EMERGENCY MEDICINE

## 2019-02-28 PROCEDURE — 83880 ASSAY OF NATRIURETIC PEPTIDE: CPT

## 2019-02-28 PROCEDURE — 700111 HCHG RX REV CODE 636 W/ 250 OVERRIDE (IP): Performed by: HOSPITALIST

## 2019-02-28 PROCEDURE — 304561 HCHG STAT O2

## 2019-02-28 PROCEDURE — 87077 CULTURE AEROBIC IDENTIFY: CPT

## 2019-02-28 PROCEDURE — 700105 HCHG RX REV CODE 258: Performed by: HOSPITALIST

## 2019-02-28 PROCEDURE — 84703 CHORIONIC GONADOTROPIN ASSAY: CPT

## 2019-02-28 PROCEDURE — 86850 RBC ANTIBODY SCREEN: CPT

## 2019-02-28 PROCEDURE — 71275 CT ANGIOGRAPHY CHEST: CPT

## 2019-02-28 PROCEDURE — 86901 BLOOD TYPING SEROLOGIC RH(D): CPT

## 2019-02-28 PROCEDURE — 770022 HCHG ROOM/CARE - ICU (200)

## 2019-02-28 PROCEDURE — 99291 CRITICAL CARE FIRST HOUR: CPT | Performed by: INTERNAL MEDICINE

## 2019-02-28 PROCEDURE — 81001 URINALYSIS AUTO W/SCOPE: CPT

## 2019-02-28 PROCEDURE — 51702 INSERT TEMP BLADDER CATH: CPT

## 2019-02-28 PROCEDURE — 84484 ASSAY OF TROPONIN QUANT: CPT

## 2019-02-28 PROCEDURE — 96367 TX/PROPH/DG ADDL SEQ IV INF: CPT

## 2019-02-28 PROCEDURE — 96365 THER/PROPH/DIAG IV INF INIT: CPT

## 2019-02-28 PROCEDURE — C1751 CATH, INF, PER/CENT/MIDLINE: HCPCS

## 2019-02-28 PROCEDURE — 700111 HCHG RX REV CODE 636 W/ 250 OVERRIDE (IP): Performed by: EMERGENCY MEDICINE

## 2019-02-28 PROCEDURE — 96375 TX/PRO/DX INJ NEW DRUG ADDON: CPT

## 2019-02-28 PROCEDURE — 82150 ASSAY OF AMYLASE: CPT

## 2019-02-28 PROCEDURE — 87186 SC STD MICRODIL/AGAR DIL: CPT

## 2019-02-28 PROCEDURE — 93005 ELECTROCARDIOGRAM TRACING: CPT | Performed by: EMERGENCY MEDICINE

## 2019-02-28 PROCEDURE — C1751 CATH, INF, PER/CENT/MIDLINE: HCPCS | Performed by: EMERGENCY MEDICINE

## 2019-02-28 PROCEDURE — 83605 ASSAY OF LACTIC ACID: CPT | Mod: 91

## 2019-02-28 PROCEDURE — 80053 COMPREHEN METABOLIC PANEL: CPT | Mod: 91

## 2019-02-28 PROCEDURE — 700102 HCHG RX REV CODE 250 W/ 637 OVERRIDE(OP): Performed by: HOSPITALIST

## 2019-02-28 PROCEDURE — 85610 PROTHROMBIN TIME: CPT | Mod: 91

## 2019-02-28 PROCEDURE — 86900 BLOOD TYPING SEROLOGIC ABO: CPT

## 2019-02-28 PROCEDURE — A9270 NON-COVERED ITEM OR SERVICE: HCPCS | Performed by: HOSPITALIST

## 2019-02-28 PROCEDURE — 02HV33Z INSERTION OF INFUSION DEVICE INTO SUPERIOR VENA CAVA, PERCUTANEOUS APPROACH: ICD-10-PCS | Performed by: EMERGENCY MEDICINE

## 2019-02-28 RX ORDER — SIMVASTATIN 40 MG
40 TABLET ORAL NIGHTLY
Status: DISCONTINUED | OUTPATIENT
Start: 2019-02-28 | End: 2019-03-01

## 2019-02-28 RX ORDER — TIZANIDINE 4 MG/1
2 TABLET ORAL 3 TIMES DAILY PRN
Status: DISCONTINUED | OUTPATIENT
Start: 2019-02-28 | End: 2019-03-04

## 2019-02-28 RX ORDER — FLUOXETINE HYDROCHLORIDE 20 MG/1
40 CAPSULE ORAL DAILY
Status: DISCONTINUED | OUTPATIENT
Start: 2019-03-01 | End: 2019-03-04

## 2019-02-28 RX ORDER — SODIUM CHLORIDE 9 MG/ML
INJECTION, SOLUTION INTRAVENOUS CONTINUOUS
Status: DISCONTINUED | OUTPATIENT
Start: 2019-02-28 | End: 2019-03-01

## 2019-02-28 RX ORDER — AMITRIPTYLINE HYDROCHLORIDE 100 MG/1
300 TABLET ORAL NIGHTLY
Status: DISCONTINUED | OUTPATIENT
Start: 2019-02-28 | End: 2019-03-04

## 2019-02-28 RX ORDER — LEVOTHYROXINE SODIUM 0.15 MG/1
150 TABLET ORAL
Status: DISCONTINUED | OUTPATIENT
Start: 2019-03-01 | End: 2019-03-04

## 2019-02-28 RX ORDER — HEPARIN SODIUM 5000 [USP'U]/ML
5000 INJECTION, SOLUTION INTRAVENOUS; SUBCUTANEOUS EVERY 8 HOURS
Status: DISCONTINUED | OUTPATIENT
Start: 2019-02-28 | End: 2019-03-04

## 2019-02-28 RX ORDER — SODIUM CHLORIDE 9 MG/ML
500 INJECTION, SOLUTION INTRAVENOUS
Status: DISCONTINUED | OUTPATIENT
Start: 2019-02-28 | End: 2019-03-04

## 2019-02-28 RX ORDER — HYDROCODONE BITARTRATE AND ACETAMINOPHEN 5; 325 MG/1; MG/1
1-2 TABLET ORAL EVERY 4 HOURS PRN
Status: ON HOLD | COMMUNITY
End: 2019-03-13

## 2019-02-28 RX ORDER — PROMETHAZINE HYDROCHLORIDE 25 MG/1
12.5-25 TABLET ORAL EVERY 4 HOURS PRN
Status: DISCONTINUED | OUTPATIENT
Start: 2019-02-28 | End: 2019-03-06

## 2019-02-28 RX ORDER — LISINOPRIL AND HYDROCHLOROTHIAZIDE 25; 20 MG/1; MG/1
1 TABLET ORAL DAILY
COMMUNITY

## 2019-02-28 RX ORDER — DEXTROSE MONOHYDRATE 25 G/50ML
25 INJECTION, SOLUTION INTRAVENOUS
Status: DISCONTINUED | OUTPATIENT
Start: 2019-02-28 | End: 2019-03-10

## 2019-02-28 RX ORDER — ONDANSETRON 4 MG/1
4 TABLET, ORALLY DISINTEGRATING ORAL EVERY 4 HOURS PRN
Status: DISCONTINUED | OUTPATIENT
Start: 2019-02-28 | End: 2019-03-06

## 2019-02-28 RX ORDER — SODIUM CHLORIDE 9 MG/ML
30 INJECTION, SOLUTION INTRAVENOUS ONCE
Status: COMPLETED | OUTPATIENT
Start: 2019-02-28 | End: 2019-02-28

## 2019-02-28 RX ORDER — LISINOPRIL 40 MG/1
40 TABLET ORAL DAILY
Status: DISCONTINUED | OUTPATIENT
Start: 2019-03-01 | End: 2019-02-28

## 2019-02-28 RX ORDER — SODIUM CHLORIDE 9 MG/ML
1000 INJECTION, SOLUTION INTRAVENOUS ONCE
Status: COMPLETED | OUTPATIENT
Start: 2019-02-28 | End: 2019-02-28

## 2019-02-28 RX ORDER — ONDANSETRON 2 MG/ML
4 INJECTION INTRAMUSCULAR; INTRAVENOUS ONCE
Status: COMPLETED | OUTPATIENT
Start: 2019-02-28 | End: 2019-02-28

## 2019-02-28 RX ORDER — POLYETHYLENE GLYCOL 3350 17 G/17G
1 POWDER, FOR SOLUTION ORAL
Status: DISCONTINUED | OUTPATIENT
Start: 2019-02-28 | End: 2019-03-04

## 2019-02-28 RX ORDER — PANTOPRAZOLE SODIUM 40 MG/1
40 TABLET, DELAYED RELEASE ORAL DAILY
COMMUNITY

## 2019-02-28 RX ORDER — AMOXICILLIN 250 MG
2 CAPSULE ORAL 2 TIMES DAILY
Status: DISCONTINUED | OUTPATIENT
Start: 2019-02-28 | End: 2019-03-04

## 2019-02-28 RX ORDER — BISACODYL 10 MG
10 SUPPOSITORY, RECTAL RECTAL
Status: DISCONTINUED | OUTPATIENT
Start: 2019-02-28 | End: 2019-03-04

## 2019-02-28 RX ORDER — GABAPENTIN 300 MG/1
600 CAPSULE ORAL 3 TIMES DAILY
Status: DISCONTINUED | OUTPATIENT
Start: 2019-02-28 | End: 2019-03-04

## 2019-02-28 RX ORDER — ONDANSETRON 2 MG/ML
4 INJECTION INTRAMUSCULAR; INTRAVENOUS EVERY 4 HOURS PRN
Status: DISCONTINUED | OUTPATIENT
Start: 2019-02-28 | End: 2019-03-15 | Stop reason: HOSPADM

## 2019-02-28 RX ORDER — PROMETHAZINE HYDROCHLORIDE 25 MG/1
12.5-25 SUPPOSITORY RECTAL EVERY 4 HOURS PRN
Status: DISCONTINUED | OUTPATIENT
Start: 2019-02-28 | End: 2019-03-15 | Stop reason: HOSPADM

## 2019-02-28 RX ORDER — TIZANIDINE HYDROCHLORIDE 2 MG/1
2 CAPSULE, GELATIN COATED ORAL 3 TIMES DAILY PRN
Status: ON HOLD | COMMUNITY
End: 2019-03-15

## 2019-02-28 RX ADMIN — SODIUM CHLORIDE: 9 INJECTION, SOLUTION INTRAVENOUS at 22:23

## 2019-02-28 RX ADMIN — CEFTRIAXONE SODIUM 2 G: 2 INJECTION, POWDER, FOR SOLUTION INTRAMUSCULAR; INTRAVENOUS at 20:06

## 2019-02-28 RX ADMIN — SENNOSIDES-DOCUSATE SODIUM TAB 8.6-50 MG 2 TABLET: 8.6-5 TAB at 22:22

## 2019-02-28 RX ADMIN — NOREPINEPHRINE BITARTRATE 10 MCG/MIN: 1 INJECTION INTRAVENOUS at 20:55

## 2019-02-28 RX ADMIN — AMITRIPTYLINE HYDROCHLORIDE 300 MG: 100 TABLET, FILM COATED ORAL at 22:21

## 2019-02-28 RX ADMIN — SODIUM CHLORIDE 3417 ML: 9 INJECTION, SOLUTION INTRAVENOUS at 19:30

## 2019-02-28 RX ADMIN — SIMVASTATIN 40 MG: 40 TABLET, FILM COATED ORAL at 22:21

## 2019-02-28 RX ADMIN — ONDANSETRON 4 MG: 2 INJECTION INTRAMUSCULAR; INTRAVENOUS at 20:10

## 2019-02-28 RX ADMIN — IOHEXOL 100 ML: 350 INJECTION, SOLUTION INTRAVENOUS at 18:33

## 2019-02-28 RX ADMIN — HEPARIN SODIUM 5000 UNITS: 5000 INJECTION, SOLUTION INTRAVENOUS; SUBCUTANEOUS at 22:22

## 2019-02-28 RX ADMIN — SODIUM CHLORIDE 1000 ML: 9 INJECTION, SOLUTION INTRAVENOUS at 18:30

## 2019-02-28 RX ADMIN — BISACODYL 10 MG: 10 SUPPOSITORY RECTAL at 22:21

## 2019-02-28 RX ADMIN — GABAPENTIN 600 MG: 300 CAPSULE ORAL at 22:22

## 2019-02-28 ASSESSMENT — ENCOUNTER SYMPTOMS
WEAKNESS: 1
NAUSEA: 1
LOSS OF CONSCIOUSNESS: 0
TINGLING: 0
SEIZURES: 0
SENSORY CHANGE: 0
HEMOPTYSIS: 0
SPEECH CHANGE: 0
FEVER: 0
HALLUCINATIONS: 0
DOUBLE VISION: 0
MYALGIAS: 0
CONSTIPATION: 1
BRUISES/BLEEDS EASILY: 0
CHILLS: 0
PALPITATIONS: 0
WEAKNESS: 0
FLANK PAIN: 0
WEIGHT LOSS: 0
BLURRED VISION: 0
ABDOMINAL PAIN: 1
HEARTBURN: 0
MUSCULOSKELETAL NEGATIVE: 1
HEADACHES: 0
RESPIRATORY NEGATIVE: 1
SHORTNESS OF BREATH: 0
VOMITING: 0
ROS SKIN COMMENTS: COOL EXTREMITIES
FOCAL WEAKNESS: 0
DIAPHORESIS: 1
EYES NEGATIVE: 1
DEPRESSION: 0
CARDIOVASCULAR NEGATIVE: 1
LOSS OF CONSCIOUSNESS: 1
PSYCHIATRIC NEGATIVE: 1
BLOOD IN STOOL: 0
BACK PAIN: 1
COUGH: 0
EYE DISCHARGE: 0
DIZZINESS: 0

## 2019-02-28 ASSESSMENT — LIFESTYLE VARIABLES
EVER_SMOKED: YES
EVER_SMOKED: YES
SUBSTANCE_ABUSE: 0

## 2019-03-01 ENCOUNTER — APPOINTMENT (OUTPATIENT)
Dept: RADIOLOGY | Facility: MEDICAL CENTER | Age: 64
DRG: 871 | End: 2019-03-01
Attending: INTERNAL MEDICINE
Payer: MEDICARE

## 2019-03-01 ENCOUNTER — APPOINTMENT (OUTPATIENT)
Dept: RADIOLOGY | Facility: MEDICAL CENTER | Age: 64
DRG: 871 | End: 2019-03-01
Attending: HOSPITALIST
Payer: MEDICARE

## 2019-03-01 ENCOUNTER — APPOINTMENT (OUTPATIENT)
Dept: RADIOLOGY | Facility: MEDICAL CENTER | Age: 64
DRG: 871 | End: 2019-03-01
Attending: SURGERY
Payer: MEDICARE

## 2019-03-01 PROBLEM — G93.40 ACUTE ENCEPHALOPATHY: Status: ACTIVE | Noted: 2019-03-01

## 2019-03-01 PROBLEM — E66.01 CLASS 2 SEVERE OBESITY DUE TO EXCESS CALORIES WITH SERIOUS COMORBIDITY AND BODY MASS INDEX (BMI) OF 37.0 TO 37.9 IN ADULT (HCC): Status: ACTIVE | Noted: 2019-03-01

## 2019-03-01 LAB
ACTION RANGE TRIGGERED IACRT: YES
ALBUMIN SERPL BCP-MCNC: 2.9 G/DL (ref 3.2–4.9)
ALBUMIN SERPL BCP-MCNC: 3.2 G/DL (ref 3.2–4.9)
ALBUMIN/GLOB SERPL: 1.1 G/DL
ALBUMIN/GLOB SERPL: 1.3 G/DL
ALP SERPL-CCNC: 315 U/L (ref 30–99)
ALP SERPL-CCNC: 319 U/L (ref 30–99)
ALT SERPL-CCNC: 23 U/L (ref 2–50)
ALT SERPL-CCNC: 24 U/L (ref 2–50)
AMYLASE SERPL-CCNC: 885 U/L (ref 20–103)
ANION GAP SERPL CALC-SCNC: 12 MMOL/L (ref 0–11.9)
ANION GAP SERPL CALC-SCNC: 12 MMOL/L (ref 0–11.9)
ANION GAP SERPL CALC-SCNC: 16 MMOL/L (ref 0–11.9)
AST SERPL-CCNC: 46 U/L (ref 12–45)
AST SERPL-CCNC: 53 U/L (ref 12–45)
BASE EXCESS BLDA CALC-SCNC: -16 MMOL/L (ref -4–3)
BASOPHILS # BLD AUTO: 0 % (ref 0–1.8)
BASOPHILS # BLD: 0 K/UL (ref 0–0.12)
BILIRUB SERPL-MCNC: 0.4 MG/DL (ref 0.1–1.5)
BILIRUB SERPL-MCNC: 0.4 MG/DL (ref 0.1–1.5)
BODY TEMPERATURE: ABNORMAL DEGREES
BUN SERPL-MCNC: 40 MG/DL (ref 8–22)
BUN SERPL-MCNC: 43 MG/DL (ref 8–22)
BUN SERPL-MCNC: 46 MG/DL (ref 8–22)
BURR CELLS BLD QL SMEAR: NORMAL
CALCIUM SERPL-MCNC: 6.8 MG/DL (ref 8.5–10.5)
CALCIUM SERPL-MCNC: 7.3 MG/DL (ref 8.5–10.5)
CALCIUM SERPL-MCNC: 7.5 MG/DL (ref 8.5–10.5)
CHLORIDE SERPL-SCNC: 111 MMOL/L (ref 96–112)
CHLORIDE SERPL-SCNC: 114 MMOL/L (ref 96–112)
CHLORIDE SERPL-SCNC: 114 MMOL/L (ref 96–112)
CO2 BLDA-SCNC: 10 MMOL/L (ref 20–33)
CO2 SERPL-SCNC: 11 MMOL/L (ref 20–33)
CO2 SERPL-SCNC: 12 MMOL/L (ref 20–33)
CO2 SERPL-SCNC: 13 MMOL/L (ref 20–33)
CORTIS SERPL-MCNC: 37.6 UG/DL (ref 0–23)
CREAT SERPL-MCNC: 1.74 MG/DL (ref 0.5–1.4)
CREAT SERPL-MCNC: 1.76 MG/DL (ref 0.5–1.4)
CREAT SERPL-MCNC: 1.83 MG/DL (ref 0.5–1.4)
EOSINOPHIL # BLD AUTO: 0 K/UL (ref 0–0.51)
EOSINOPHIL NFR BLD: 0 % (ref 0–6.9)
ERYTHROCYTE [DISTWIDTH] IN BLOOD BY AUTOMATED COUNT: 47.8 FL (ref 35.9–50)
GLOBULIN SER CALC-MCNC: 2.4 G/DL (ref 1.9–3.5)
GLOBULIN SER CALC-MCNC: 2.7 G/DL (ref 1.9–3.5)
GLUCOSE BLD-MCNC: 152 MG/DL (ref 65–99)
GLUCOSE BLD-MCNC: 166 MG/DL (ref 65–99)
GLUCOSE BLD-MCNC: 200 MG/DL (ref 65–99)
GLUCOSE BLD-MCNC: 218 MG/DL (ref 65–99)
GLUCOSE SERPL-MCNC: 174 MG/DL (ref 65–99)
GLUCOSE SERPL-MCNC: 225 MG/DL (ref 65–99)
GLUCOSE SERPL-MCNC: 314 MG/DL (ref 65–99)
HCO3 BLDA-SCNC: 9.1 MMOL/L (ref 17–25)
HCT VFR BLD AUTO: 44.7 % (ref 37–47)
HEMOCCULT STL QL: POSITIVE
HGB BLD-MCNC: 14.2 G/DL (ref 12–16)
HOROWITZ INDEX BLDA+IHG-RTO: 281 MM[HG]
INST. QUALIFIED PATIENT IIQPT: YES
LACTATE BLD-SCNC: 2.7 MMOL/L (ref 0.5–2)
LACTATE BLD-SCNC: 2.9 MMOL/L (ref 0.5–2)
LACTATE BLD-SCNC: 3.3 MMOL/L (ref 0.5–2)
LACTATE BLD-SCNC: 3.5 MMOL/L (ref 0.5–2)
LACTATE BLD-SCNC: 3.9 MMOL/L (ref 0.5–2)
LACTATE BLD-SCNC: 5.6 MMOL/L (ref 0.5–2)
LIPASE SERPL-CCNC: 133 U/L (ref 11–82)
LYMPHOCYTES # BLD AUTO: 1.88 K/UL (ref 1–4.8)
LYMPHOCYTES NFR BLD: 11.2 % (ref 22–41)
MANUAL DIFF BLD: NORMAL
MCH RBC QN AUTO: 29.2 PG (ref 27–33)
MCHC RBC AUTO-ENTMCNC: 31.8 G/DL (ref 33.6–35)
MCV RBC AUTO: 91.8 FL (ref 81.4–97.8)
MONOCYTES # BLD AUTO: 1.01 K/UL (ref 0–0.85)
MONOCYTES NFR BLD AUTO: 6 % (ref 0–13.4)
MORPHOLOGY BLD-IMP: NORMAL
NEUTROPHILS # BLD AUTO: 13.91 K/UL (ref 2–7.15)
NEUTROPHILS NFR BLD: 74.2 % (ref 44–72)
NEUTS BAND NFR BLD MANUAL: 8.6 % (ref 0–10)
NRBC # BLD AUTO: 0.03 K/UL
NRBC BLD-RTO: 0.2 /100 WBC
O2/TOTAL GAS SETTING VFR VENT: 21 %
OVALOCYTES BLD QL SMEAR: NORMAL
PCO2 BLDA: 21.2 MMHG (ref 26–37)
PCO2 TEMP ADJ BLDA: 21.1 MMHG (ref 26–37)
PH BLDA: 7.24 [PH] (ref 7.4–7.5)
PH TEMP ADJ BLDA: 7.24 [PH] (ref 7.4–7.5)
PLATELET # BLD AUTO: 248 K/UL (ref 164–446)
PLATELET BLD QL SMEAR: NORMAL
PMV BLD AUTO: 9.3 FL (ref 9–12.9)
PO2 BLDA: 59 MMHG (ref 64–87)
PO2 TEMP ADJ BLDA: 59 MMHG (ref 64–87)
POIKILOCYTOSIS BLD QL SMEAR: NORMAL
POLYCHROMASIA BLD QL SMEAR: NORMAL
POTASSIUM SERPL-SCNC: 5.1 MMOL/L (ref 3.6–5.5)
POTASSIUM SERPL-SCNC: 5.7 MMOL/L (ref 3.6–5.5)
POTASSIUM SERPL-SCNC: 6 MMOL/L (ref 3.6–5.5)
PROT SERPL-MCNC: 5.6 G/DL (ref 6–8.2)
PROT SERPL-MCNC: 5.6 G/DL (ref 6–8.2)
RBC # BLD AUTO: 4.87 M/UL (ref 4.2–5.4)
RBC BLD AUTO: PRESENT
SAO2 % BLDA: 86 % (ref 93–99)
SMUDGE CELLS BLD QL SMEAR: NORMAL
SODIUM SERPL-SCNC: 138 MMOL/L (ref 135–145)
SODIUM SERPL-SCNC: 138 MMOL/L (ref 135–145)
SODIUM SERPL-SCNC: 139 MMOL/L (ref 135–145)
SPECIMEN DRAWN FROM PATIENT: ABNORMAL
TSH SERPL DL<=0.005 MIU/L-ACNC: 1.11 UIU/ML (ref 0.38–5.33)
WBC # BLD AUTO: 16.8 K/UL (ref 4.8–10.8)

## 2019-03-01 PROCEDURE — 99233 SBSQ HOSP IP/OBS HIGH 50: CPT | Performed by: HOSPITALIST

## 2019-03-01 PROCEDURE — 36600 WITHDRAWAL OF ARTERIAL BLOOD: CPT

## 2019-03-01 PROCEDURE — 306565 RIGID MIT RESTRAINT(PAIR): Performed by: INTERNAL MEDICINE

## 2019-03-01 PROCEDURE — 700102 HCHG RX REV CODE 250 W/ 637 OVERRIDE(OP): Performed by: HOSPITALIST

## 2019-03-01 PROCEDURE — A9270 NON-COVERED ITEM OR SERVICE: HCPCS | Performed by: HOSPITALIST

## 2019-03-01 PROCEDURE — 85027 COMPLETE CBC AUTOMATED: CPT

## 2019-03-01 PROCEDURE — 82962 GLUCOSE BLOOD TEST: CPT | Mod: 91

## 2019-03-01 PROCEDURE — 700105 HCHG RX REV CODE 258: Performed by: INTERNAL MEDICINE

## 2019-03-01 PROCEDURE — 83605 ASSAY OF LACTIC ACID: CPT

## 2019-03-01 PROCEDURE — 82803 BLOOD GASES ANY COMBINATION: CPT

## 2019-03-01 PROCEDURE — 84443 ASSAY THYROID STIM HORMONE: CPT

## 2019-03-01 PROCEDURE — 99291 CRITICAL CARE FIRST HOUR: CPT | Performed by: INTERNAL MEDICINE

## 2019-03-01 PROCEDURE — 700101 HCHG RX REV CODE 250

## 2019-03-01 PROCEDURE — 700101 HCHG RX REV CODE 250: Performed by: INTERNAL MEDICINE

## 2019-03-01 PROCEDURE — 82533 TOTAL CORTISOL: CPT

## 2019-03-01 PROCEDURE — 700111 HCHG RX REV CODE 636 W/ 250 OVERRIDE (IP): Performed by: HOSPITALIST

## 2019-03-01 PROCEDURE — 82272 OCCULT BLD FECES 1-3 TESTS: CPT

## 2019-03-01 PROCEDURE — 85007 BL SMEAR W/DIFF WBC COUNT: CPT

## 2019-03-01 PROCEDURE — 700111 HCHG RX REV CODE 636 W/ 250 OVERRIDE (IP): Performed by: INTERNAL MEDICINE

## 2019-03-01 PROCEDURE — 770022 HCHG ROOM/CARE - ICU (200)

## 2019-03-01 PROCEDURE — 70450 CT HEAD/BRAIN W/O DYE: CPT

## 2019-03-01 PROCEDURE — 700117 HCHG RX CONTRAST REV CODE 255: Performed by: INTERNAL MEDICINE

## 2019-03-01 PROCEDURE — 80053 COMPREHEN METABOLIC PANEL: CPT

## 2019-03-01 PROCEDURE — 74176 CT ABD & PELVIS W/O CONTRAST: CPT

## 2019-03-01 PROCEDURE — 700105 HCHG RX REV CODE 258: Performed by: HOSPITALIST

## 2019-03-01 PROCEDURE — 80048 BASIC METABOLIC PNL TOTAL CA: CPT

## 2019-03-01 RX ORDER — NOREPINEPHRINE BITARTRATE 1 MG/ML
INJECTION, SOLUTION INTRAVENOUS
Status: COMPLETED
Start: 2019-03-01 | End: 2019-03-01

## 2019-03-01 RX ORDER — SODIUM CHLORIDE, SODIUM LACTATE, POTASSIUM CHLORIDE, AND CALCIUM CHLORIDE .6; .31; .03; .02 G/100ML; G/100ML; G/100ML; G/100ML
1000 INJECTION, SOLUTION INTRAVENOUS ONCE
Status: COMPLETED | OUTPATIENT
Start: 2019-03-01 | End: 2019-03-01

## 2019-03-01 RX ORDER — ACETAMINOPHEN 325 MG/1
650 TABLET ORAL EVERY 4 HOURS PRN
Status: DISCONTINUED | OUTPATIENT
Start: 2019-03-01 | End: 2019-03-04

## 2019-03-01 RX ORDER — ASPIRIN 325 MG
325 TABLET ORAL DAILY
Status: DISCONTINUED | OUTPATIENT
Start: 2019-03-02 | End: 2019-03-04

## 2019-03-01 RX ORDER — ATORVASTATIN CALCIUM 40 MG/1
40 TABLET, FILM COATED ORAL EVERY EVENING
Status: DISCONTINUED | OUTPATIENT
Start: 2019-03-01 | End: 2019-03-04

## 2019-03-01 RX ORDER — MORPHINE SULFATE 10 MG/ML
2-5 INJECTION, SOLUTION INTRAMUSCULAR; INTRAVENOUS
Status: DISCONTINUED | OUTPATIENT
Start: 2019-03-01 | End: 2019-03-05

## 2019-03-01 RX ADMIN — INSULIN HUMAN 1 UNITS: 100 INJECTION, SOLUTION PARENTERAL at 12:11

## 2019-03-01 RX ADMIN — PIPERACILLIN AND TAZOBACTAM 3.38 G: 3; .375 INJECTION, POWDER, LYOPHILIZED, FOR SOLUTION INTRAVENOUS; PARENTERAL at 03:07

## 2019-03-01 RX ADMIN — PIPERACILLIN SODIUM AND TAZOBACTAM SODIUM 3.38 G: 3; .375 INJECTION, POWDER, FOR SOLUTION INTRAVENOUS at 06:02

## 2019-03-01 RX ADMIN — INSULIN HUMAN 1 UNITS: 100 INJECTION, SOLUTION PARENTERAL at 06:24

## 2019-03-01 RX ADMIN — INSULIN HUMAN 1 UNITS: 100 INJECTION, SOLUTION PARENTERAL at 18:01

## 2019-03-01 RX ADMIN — SODIUM CHLORIDE, POTASSIUM CHLORIDE, SODIUM LACTATE AND CALCIUM CHLORIDE 1000 ML: 600; 310; 30; 20 INJECTION, SOLUTION INTRAVENOUS at 01:37

## 2019-03-01 RX ADMIN — IOHEXOL 50 ML: 240 INJECTION, SOLUTION INTRATHECAL; INTRAVASCULAR; INTRAVENOUS; ORAL at 03:38

## 2019-03-01 RX ADMIN — ONDANSETRON 4 MG: 2 INJECTION INTRAMUSCULAR; INTRAVENOUS at 01:37

## 2019-03-01 RX ADMIN — PIPERACILLIN SODIUM AND TAZOBACTAM SODIUM 3.38 G: 3; .375 INJECTION, POWDER, FOR SOLUTION INTRAVENOUS at 20:35

## 2019-03-01 RX ADMIN — HEPARIN SODIUM 5000 UNITS: 5000 INJECTION, SOLUTION INTRAVENOUS; SUBCUTANEOUS at 06:02

## 2019-03-01 RX ADMIN — SODIUM CHLORIDE: 9 INJECTION, SOLUTION INTRAVENOUS at 12:09

## 2019-03-01 RX ADMIN — ACETAMINOPHEN 650 MG: 325 TABLET, FILM COATED ORAL at 14:54

## 2019-03-01 RX ADMIN — MORPHINE SULFATE 2 MG: 10 INJECTION INTRAVENOUS at 23:25

## 2019-03-01 RX ADMIN — ATORVASTATIN CALCIUM 40 MG: 40 TABLET, FILM COATED ORAL at 20:35

## 2019-03-01 RX ADMIN — VASOPRESSIN 0.03 UNITS/MIN: 20 INJECTION INTRAVENOUS at 08:45

## 2019-03-01 RX ADMIN — VASOPRESSIN 0.03 UNITS/MIN: 20 INJECTION INTRAVENOUS at 18:08

## 2019-03-01 RX ADMIN — GABAPENTIN 600 MG: 300 CAPSULE ORAL at 12:09

## 2019-03-01 RX ADMIN — NOREPINEPHRINE BITARTRATE 4 MG: 1 INJECTION INTRAVENOUS at 04:09

## 2019-03-01 RX ADMIN — NOREPINEPHRINE BITARTRATE 34 MCG/MIN: 1 INJECTION INTRAVENOUS at 06:03

## 2019-03-01 RX ADMIN — HEPARIN SODIUM 5000 UNITS: 5000 INJECTION, SOLUTION INTRAVENOUS; SUBCUTANEOUS at 14:25

## 2019-03-01 RX ADMIN — HEPARIN SODIUM 5000 UNITS: 5000 INJECTION, SOLUTION INTRAVENOUS; SUBCUTANEOUS at 20:35

## 2019-03-01 RX ADMIN — SODIUM ACETATE: 3.28 INJECTION, SOLUTION, CONCENTRATE INTRAVENOUS at 19:31

## 2019-03-01 RX ADMIN — AMITRIPTYLINE HYDROCHLORIDE 300 MG: 100 TABLET, FILM COATED ORAL at 20:35

## 2019-03-01 RX ADMIN — NOREPINEPHRINE BITARTRATE 25 MCG/MIN: 1 INJECTION INTRAVENOUS at 12:23

## 2019-03-01 RX ADMIN — PIPERACILLIN SODIUM AND TAZOBACTAM SODIUM 3.38 G: 3; .375 INJECTION, POWDER, FOR SOLUTION INTRAVENOUS at 14:25

## 2019-03-01 RX ADMIN — GABAPENTIN 600 MG: 300 CAPSULE ORAL at 18:13

## 2019-03-01 RX ADMIN — INSULIN HUMAN 2 UNITS: 100 INJECTION, SOLUTION PARENTERAL at 01:38

## 2019-03-01 RX ADMIN — MORPHINE SULFATE 2 MG: 10 INJECTION INTRAVENOUS at 20:34

## 2019-03-01 ASSESSMENT — COPD QUESTIONNAIRES
HAVE YOU SMOKED AT LEAST 100 CIGARETTES IN YOUR ENTIRE LIFE: YES
IN THE PAST 12 MONTHS DO YOU DO LESS THAN YOU USED TO BECAUSE OF YOUR BREATHING PROBLEMS: DISAGREE/UNSURE
COPD SCREENING SCORE: 4
DO YOU EVER COUGH UP ANY MUCUS OR PHLEGM?: NO/ONLY WITH OCCASIONAL COLDS OR INFECTIONS
DURING THE PAST 4 WEEKS HOW MUCH DID YOU FEEL SHORT OF BREATH: NONE/LITTLE OF THE TIME

## 2019-03-01 ASSESSMENT — ENCOUNTER SYMPTOMS
NAUSEA: 0
FEVER: 0
ABDOMINAL PAIN: 1
VOMITING: 0
CHILLS: 0
NERVOUS/ANXIOUS: 0
DIZZINESS: 0
SPUTUM PRODUCTION: 0
COUGH: 0
PALPITATIONS: 0
BACK PAIN: 0
SHORTNESS OF BREATH: 0
BLURRED VISION: 0
SORE THROAT: 0
FOCAL WEAKNESS: 0
CONSTIPATION: 1
DIARRHEA: 1
DEPRESSION: 0

## 2019-03-01 ASSESSMENT — COGNITIVE AND FUNCTIONAL STATUS - GENERAL
CLIMB 3 TO 5 STEPS WITH RAILING: A LOT
STANDING UP FROM CHAIR USING ARMS: A LOT
HELP NEEDED FOR BATHING: A LOT
DAILY ACTIVITIY SCORE: 16
TOILETING: A LOT
DRESSING REGULAR UPPER BODY CLOTHING: A LITTLE
MOVING FROM LYING ON BACK TO SITTING ON SIDE OF FLAT BED: A LITTLE
SUGGESTED CMS G CODE MODIFIER DAILY ACTIVITY: CK
SUGGESTED CMS G CODE MODIFIER MOBILITY: CK
WALKING IN HOSPITAL ROOM: A LITTLE
DRESSING REGULAR LOWER BODY CLOTHING: A LOT
MOBILITY SCORE: 16
PERSONAL GROOMING: A LITTLE
MOVING TO AND FROM BED TO CHAIR: A LOT

## 2019-03-01 ASSESSMENT — PATIENT HEALTH QUESTIONNAIRE - PHQ9
SUM OF ALL RESPONSES TO PHQ9 QUESTIONS 1 AND 2: 0
2. FEELING DOWN, DEPRESSED, IRRITABLE, OR HOPELESS: NOT AT ALL
1. LITTLE INTEREST OR PLEASURE IN DOING THINGS: NOT AT ALL

## 2019-03-01 ASSESSMENT — LIFESTYLE VARIABLES: ALCOHOL_USE: NO

## 2019-03-01 NOTE — CONSULTS
Critical Care Consultation    Date of consult: 2/28/2019    Referring Physician  No att. providers found    Reason for Consultation  Weakness and abdominal pain     History of Presenting Illness  63 y.o. female who presented 2/28/2019 with several days of constipation but feeling much weaker on day of hospital admission. Feels that stomach is bloated. Denies focal abdomina pain, vomiting, dysuria, chest pain, SOB, fever, sweats, chills, illness among others at home. In ED noted to SBPs in the 60s and given 4.5 liters of NS. A central line was placed and pt started on Levophed for maintain a MAP >=65. Cultures of blood and urine taken and given  Empiric ceftriaxone for pesumed UTI. CT of chest abdomen/pelvis done in ED and read by radiology as showing on acute large amount stool but otherwise no impression of PE on CT scan and not acute pthology     Code Status  Full Code    Review of Systems  Review of Systems   Constitutional: Positive for diaphoresis and malaise/fatigue. Negative for chills, fever and weight loss.   HENT: Negative.    Eyes: Negative.    Respiratory: Negative.    Cardiovascular: Negative.    Gastrointestinal: Positive for abdominal pain, constipation and nausea. Negative for blood in stool and melena.   Genitourinary: Negative.    Musculoskeletal: Negative.    Skin:        Cool extremities   Neurological: Positive for weakness. Negative for dizziness, tingling, sensory change, speech change, focal weakness, seizures, loss of consciousness and headaches.   Endo/Heme/Allergies: Negative.    Psychiatric/Behavioral: Negative.        Past Medical History   has a past medical history of Arthritis; Cancer ([facility]) ([facility]) (1982); Dental disorder; Diabetes ([facility]) ([facility]); Disorder of thyroid; Heart burn; and Hypertension.    Surgical History   has a past surgical history that includes cholecystectomy; gyn surgery; other (2009); other orthopedic surgery; lumbar laminectomy diskectomy (10/20/2017);  foraminotomy (Bilateral, 10/20/2017); and lumbar fusion o-arm (10/20/2017).    Family History  family history is not on file.    Social History   reports that she has been smoking Cigarettes.  She has a 47.00 pack-year smoking history. She has never used smokeless tobacco. She reports that she does not drink alcohol or use drugs.    Medications  Home Medications     Reviewed by Bertram Whitehead (Pharmacy Tech) on 02/28/19 at 2135  Med List Status: Complete   Medication Last Dose Status   amitriptyline (ELAVIL) 150 MG Tab 2/27/2019 Active   Ascorbic Acid (VITAMIN C) 1000 MG Tab 2/28/2019 Active   gabapentin (NEURONTIN) 300 MG Cap 2/28/2019 Active   levothyroxine (SYNTHROID) 150 MCG Tab 2/28/2019 Active   lisinopril-hydrochlorothiazide (PRINZIDE, ZESTORETIC) 20-25 MG per tablet 2/28/2019 Active   metformin (GLUCOPHAGE) 1000 MG tablet 2/28/2019 Active   metoprolol SR (TOPROL XL) 25 MG TABLET SR 24 HR 2/28/2019 Active   pantoprazole (PROTONIX) 40 MG Tablet Delayed Response 2/28/2019 Active   simvastatin (ZOCOR) 40 MG Tab 2/27/2019 Active   SUPER B COMPLEX/C PO 2/28/2019 Active   tizanidine (ZANAFLEX) 2 MG capsule UNK Active              Current Facility-Administered Medications   Medication Dose Route Frequency Provider Last Rate Last Dose   • NS (BOLUS) infusion 500 mL  500 mL Intravenous Once PRN Kareem Solis M.D.       • [START ON 3/1/2019] cefTRIAXone (ROCEPHIN) 2 g in  mL IVPB  2 g Intravenous Q24HRS Kareem Solis M.D.       • norepinephrine (LEVOPHED) 8 mg in  mL Infusion  0-30 mcg/min Intravenous Continuous Kareem Solis M.D. 26.3 mL/hr at 02/28/19 2100 14 mcg/min at 02/28/19 2100   • senna-docusate (PERICOLACE or SENOKOT S) 8.6-50 MG per tablet 2 Tab  2 Tab Oral BID Kareem Solis M.D.   2 Tab at 02/28/19 2222    And   • polyethylene glycol/lytes (MIRALAX) PACKET 1 Packet  1 Packet Oral QDAY PRN Kareem Solis M.D.        And   • magnesium hydroxide (MILK OF MAGNESIA)  suspension 30 mL  30 mL Oral QDAY PRN Kareem Solis M.D.        And   • bisacodyl (DULCOLAX) suppository 10 mg  10 mg Rectal QDAY PRN Kareem Solis M.D.   10 mg at 02/28/19 2221   • Respiratory Care per Protocol   Nebulization Continuous RT Kareem Solis M.D.       • NS infusion   Intravenous Continuous Kareem Solis M.D. 125 mL/hr at 02/28/19 2223     • heparin injection 5,000 Units  5,000 Units Subcutaneous Q8HRS Kareem Solis M.D.   5,000 Units at 02/28/19 2222   • ondansetron (ZOFRAN) syringe/vial injection 4 mg  4 mg Intravenous Q4HRS PRN Kareem Solis M.D.       • ondansetron (ZOFRAN ODT) dispertab 4 mg  4 mg Oral Q4HRS PRN Kareem Solis M.D.       • promethazine (PHENERGAN) tablet 12.5-25 mg  12.5-25 mg Oral Q4HRS PRN Kareem Solis M.D.       • promethazine (PHENERGAN) suppository 12.5-25 mg  12.5-25 mg Rectal Q4HRS PRN Kareem Solis M.D.       • prochlorperazine (COMPAZINE) injection 5-10 mg  5-10 mg Intravenous Q4HRS PRN Kareem Solis M.D.       • amitriptyline (ELAVIL) tablet 300 mg  300 mg Oral Nightly Kareem Solis M.D.   300 mg at 02/28/19 2221   • [START ON 3/1/2019] FLUoxetine (PROZAC) capsule 40 mg  40 mg Oral DAILY Kareem Solis M.D.       • gabapentin (NEURONTIN) capsule 600 mg  600 mg Oral TID Kareem Solis M.D.   600 mg at 02/28/19 2222   • [START ON 3/1/2019] levothyroxine (SYNTHROID) tablet 150 mcg  150 mcg Oral AM ES Kareem Solis M.D.       • simvastatin (ZOCOR) tablet 40 mg  40 mg Oral Nightly Kareem Solis M.D.   40 mg at 02/28/19 2221   • tizanidine (ZANAFLEX) tablet 2 mg  2 mg Oral TID PRN Kareem Solis M.D.       • [START ON 3/1/2019] insulin regular (HUMULIN R) injection 1-6 Units  1-6 Units Subcutaneous Q6HRS Kareem Solis M.D.        And   • glucose 4 g chewable tablet 16 g  16 g Oral Q15 MIN PRN Kareem Solis M.D.        And   • dextrose 50% (D50W) injection 25 mL  25 mL Intravenous Q15 MIN PRN Kareem Solis,  "M.D.           Allergies  Allergies   Allergen Reactions   • Hydromorphone      Swelling   • Tramadol      \"loose short term memory\"       Vital Signs last 24 hours  Temp:  [36.5 °C (97.7 °F)] 36.5 °C (97.7 °F)  Pulse:  [55-64] 62  Resp:  [15-34] 34  BP: (58-75)/(26-51) 75/51  SpO2:  [92 %-100 %] 94 %    Physical Exam  Physical Exam   Constitutional: She is oriented to person, place, and time. She appears well-developed and well-nourished.   Morbidly obese, no overt distress   HENT:   Head: Normocephalic and atraumatic.   Eyes: Pupils are equal, round, and reactive to light. Conjunctivae and EOM are normal.   Neck: Normal range of motion. Neck supple.   Abdominal: She exhibits distension. There is tenderness. There is guarding. There is no rebound.   Large, very distended abdomen. Diffusely tender with tenderness to percussion, greatest tenderness LLQ, bowel sounds present    Musculoskeletal: Normal range of motion.   Neurological: She is alert and oriented to person, place, and time.   Skin: Skin is dry.   Cool in extremities   Psychiatric: She has a normal mood and affect. Her behavior is normal. Thought content normal.       Fluids    Intake/Output Summary (Last 24 hours) at 19 2242  Last data filed at 19 2223   Gross per 24 hour   Intake           327.87 ml   Output                0 ml   Net           327.87 ml       Laboratory  Recent Results (from the past 48 hour(s))   EKG    Collection Time: 19  6:11 PM   Result Value Ref Range    Report       [facility] Emergency Dept.    Test Date:  2019  Pt Name:    KARYNA SHRESTHA                    Department: ER  MRN:        [id number]                      Room:        12  Gender:     Female                       Technician: [id number]  :        1955                   Requested By:ER TRIAGE PROTOCOL  Order #:    [id number]                    Michelle MD: PAYAM LANTIGUA, DO    Measurements  Intervals                           "      Axis  Rate:       60                           P:          41  ME:         204                          QRS:        79  QRSD:       96                           T:          73  QT:         432  QTc:        432    Interpretive Statements  SINUS RHYTHM  ANTEROLATERAL INFARCT, AGE INDETERMINATE  Compared to ECG 10/22/2017 01:09:44  Myocardial infarct finding now present    Electronically Signed On 2- 18:23:29 PST by PAYAM LANTIGUA DO     TROPONIN    Collection Time: 02/28/19  6:22 PM   Result Value Ref Range    Troponin I <0.01 0.00 - 0.04 ng/mL   BTYPE NATRIURETIC PEPTIDE    Collection Time: 02/28/19  6:22 PM   Result Value Ref Range    B Natriuretic Peptide 13 0 - 100 pg/mL   CBC WITH DIFFERENTIAL    Collection Time: 02/28/19  6:22 PM   Result Value Ref Range    WBC 18.6 (H) 4.8 - 10.8 K/uL    RBC 5.36 4.20 - 5.40 M/uL    Hemoglobin 15.8 12.0 - 16.0 g/dL    Hematocrit 49.3 (H) 37.0 - 47.0 %    MCV 92.0 81.4 - 97.8 fL    MCH 29.5 27.0 - 33.0 pg    MCHC 32.0 (L) 33.6 - 35.0 g/dL    RDW 47.3 35.9 - 50.0 fL    Platelet Count 288 164 - 446 K/uL    MPV 9.3 9.0 - 12.9 fL    Neutrophils-Polys 77.80 (H) 44.00 - 72.00 %    Lymphocytes 17.50 (L) 22.00 - 41.00 %    Monocytes 2.30 0.00 - 13.40 %    Eosinophils 0.70 0.00 - 6.90 %    Basophils 0.80 0.00 - 1.80 %    Immature Granulocytes 0.90 0.00 - 0.90 %    Nucleated RBC 0.10 /100 WBC    Neutrophils (Absolute) 14.47 (H) 2.00 - 7.15 K/uL    Lymphs (Absolute) 3.24 1.00 - 4.80 K/uL    Monos (Absolute) 0.42 0.00 - 0.85 K/uL    Eos (Absolute) 0.13 0.00 - 0.51 K/uL    Baso (Absolute) 0.14 (H) 0.00 - 0.12 K/uL    Immature Granulocytes (abs) 0.16 (H) 0.00 - 0.11 K/uL    NRBC (Absolute) 0.02 K/uL   COMP METABOLIC PANEL    Collection Time: 02/28/19  6:22 PM   Result Value Ref Range    Sodium 137 135 - 145 mmol/L    Potassium 4.9 3.6 - 5.5 mmol/L    Chloride 105 96 - 112 mmol/L    Co2 14 (L) 20 - 33 mmol/L    Anion Gap 18.0 (H) 0.0 - 11.9    Glucose 291 (H) 65 - 99 mg/dL     Bun 37 (H) 8 - 22 mg/dL    Creatinine 1.83 (H) 0.50 - 1.40 mg/dL    Calcium 8.3 (L) 8.5 - 10.5 mg/dL    AST(SGOT) 49 (H) 12 - 45 U/L    ALT(SGPT) 24 2 - 50 U/L    Alkaline Phosphatase 283 (H) 30 - 99 U/L    Total Bilirubin 0.6 0.1 - 1.5 mg/dL    Albumin 3.6 3.2 - 4.9 g/dL    Total Protein 6.6 6.0 - 8.2 g/dL    Globulin 3.0 1.9 - 3.5 g/dL    A-G Ratio 1.2 g/dL   APTT (PTT)    Collection Time: 19  6:22 PM   Result Value Ref Range    APTT 39.5 (H) 24.7 - 36.0 sec   PROTHROMBIN TIME (INR)    Collection Time: 19  6:22 PM   Result Value Ref Range    PT 17.8 (H) 12.0 - 14.6 sec    INR 1.45 (H) 0.87 - 1.13   HCG QUAL SERUM    Collection Time: 19  6:22 PM   Result Value Ref Range    Beta-Hcg Qualitative Serum Negative Negative   ESTIMATED GFR    Collection Time: 19  6:22 PM   Result Value Ref Range    GFR If  34 (A) >60 mL/min/1.73 m 2    GFR If Non  28 (A) >60 mL/min/1.73 m 2   COD - Adult (Type and Screen)    Collection Time: 19  6:27 PM   Result Value Ref Range    ABO Grouping Only B     Rh Grouping Only NEG     Antibody Screen-Cod NEG    EKG    Collection Time: 19  6:38 PM   Result Value Ref Range    Report       [facility] Emergency Dept.    Test Date:  2019  Pt Name:    KARYNA SHRESTHA                    Department: ER  MRN:        [id number]                      Room:        12  Gender:     Female                       Technician: [id number]  :        1955                   Requested By:PAYAM LANTIGUA  Order #:    [id number]                    Reading MD: PAYAM LANTIGUA, DO    Measurements  Intervals                                Axis  Rate:       55                           P:          62  OR:         212                          QRS:        73  QRSD:       102                          T:          77  QT:         480  QTc:        460    Interpretive Statements  SINUS BRADYCARDIA  BORDERLINE AV CONDUCTION  DELAY  ANTERIOR INFARCT, AGE INDETERMINATE  Compared to ECG 02/28/2019 18:11:49  Sinus rhythm no longer present  Myocardial infarct finding still present    Electronically Signed On 2- 21:07:40 PST by PAYAM LANTIGUA DO     LACTIC ACID    Collection Time: 02/28/19  7:00 PM   Result Value Ref Range    Lactic Acid 5.6 (HH) 0.5 - 2.0 mmol/L   ABO AND RH CONFIRMATION    Collection Time: 02/28/19  7:26 PM   Result Value Ref Range    ABO Confirm B     Second Rh Group NEG    URINALYSIS,CULTURE IF INDICATED    Collection Time: 02/28/19  7:53 PM   Result Value Ref Range    Color DK Yellow     Character Turbid (A)     Specific Gravity 1.033 <1.035    Ph 5.0 5.0 - 8.0    Glucose Negative Negative mg/dL    Ketones Trace (A) Negative mg/dL    Protein 300 (A) Negative mg/dL    Bilirubin Small (A) Negative    Urobilinogen, Urine 1.0 Negative    Nitrite Negative Negative    Leukocyte Esterase Moderate (A) Negative    Occult Blood Trace (A) Negative    Micro Urine Req Microscopic    URINE MICROSCOPIC (W/UA)    Collection Time: 02/28/19  7:53 PM   Result Value Ref Range    WBC Packed (A) /hpf    RBC 5-10 (A) /hpf    Bacteria Many (A) None /hpf    Epithelial Cells Few /hpf    Hyaline Cast 0-2 /lpf   LACTIC ACID    Collection Time: 02/28/19  9:00 PM   Result Value Ref Range    Lactic Acid 3.7 (H) 0.5 - 2.0 mmol/L       Imaging      Assessment/Plan  No new Assessment & Plan notes have been filed under this hospital service since the last note was generated.  Service: Pulmonary    Septic Shock  The patient presented to the emergency department with abdominal pain severe hypertension consistent with shock and lactic acidosis.  Patient had some peritoneal findings to my exam.  Resuscitated with over 5 L of fluid as well as requiring pressor agents.  Surgery consultation was obtained.  CT of the abdomen and chest did not show any acute abdominal abdominal processes.  Patient was started empirically on antibiotics because of the  presentation of abdominal pain shock and elevated lactate consistent with a possible bacterial process.  I titrated fluids and pressors.  I also did serial serial re-evaluations of her abdomen.  Injury determined a watchful waiting approach was indicated    Acute onset abdominal pain  Etiology of this is not clear at this point patient had tenderness to percussion and some voluntary guarding at time of my exam however the exam seems to be variable in different providers are finding different results.  CT as noted above did not show acute surgical process.  However, worrisome is the hypotension and shock state elevated lactate.  Thus we will continue serial exams appreciate his surgery is going to continue to follow.  Also titrate fluids and pressors to maintain urine output greater than 30 cc an hour and a mean arterial pressure of 85 or greater.    Critical illness-patient had a presentation highly worrisome for severe morbidity and even mortality on arrival to the ED in the ICU.  Patient's on pressors has had a large fluid resuscitation this is now take getting serial examinations by me to determine fluid and pressor endpoints and to look for progression or change in the symptoms and in the findings on exam.  She remains at high risk for prolonged ICU stay and with morbidity mortality.  Discussed patient condition and risk of morbidity and/or mortality with Hospitalist, Family, RN, RT, Patient and general surgery.    The patient remains critically ill.  Critical care time = 140 minutes in directly providing and coordinating critical care and extensive data review.  No time overlap and excludes procedures.

## 2019-03-01 NOTE — PROGRESS NOTES
Critical Care Progress Note    Date of admission  2/28/2019    Chief Complaint  63 y.o. female admitted 2/28/2019 with weakness and abdo pain    Hospital Course    63 y.o. female who presented 2/28/2019 with several days of constipation but feeling much weaker on day of hospital admission. Feels that stomach is bloated. Denies focal abdomina pain, vomiting, dysuria, chest pain, SOB, fever, sweats, chills, illness among others at home. In ED noted to SBPs in the 60s and given 4.5 liters of NS. A central line was placed and pt started on Levophed for maintain a MAP >=65. Cultures of blood and urine taken and given  Empiric ceftriaxone for pesumed UTI. CT of chest abdomen/pelvis done in ED and read by radiology as showing on acute large amount stool but otherwise no impression of PE on CT scan and not acute pthology       Interval Problem Update  Reviewed last 24 hour events:  Tm 97.7  +2.5L over last 24hr  Ct abdo/pelvis this a.m. Reviewed  Wbc 13->16  K 6->5.7  Cr 1.7->1.8  LA 2.9->3.3    Bcx 2/28 pending  Ucx 2/28 pending    Zosyn 2/28-p    Levophed @34  NS @ 125    Review of Systems  Review of Systems   Constitutional: Positive for malaise/fatigue. Negative for chills and fever.   HENT: Negative for congestion.    Eyes: Negative for blurred vision.   Respiratory: Negative for cough, sputum production and shortness of breath.    Cardiovascular: Negative for chest pain and palpitations.   Gastrointestinal: Positive for abdominal pain and constipation. Negative for nausea and vomiting.   Neurological: Negative for focal weakness.   Psychiatric/Behavioral: Negative for depression.   All other systems reviewed and are negative.       Vital Signs for last 24 hours   Temp:  [36.4 °C (97.5 °F)-36.5 °C (97.7 °F)] 36.4 °C (97.5 °F)  Pulse:  [40-92] 92  Resp:  [15-70] 27  BP: (58-91)/(26-57) 91/57  SpO2:  [79 %-100 %] 93 %    Hemodynamic parameters for last 24 hours       Respiratory Information for the last 24 hours        Physical Exam   Physical Exam   Constitutional: She appears well-developed and well-nourished.   HENT:   Head: Normocephalic and atraumatic.   Eyes: Pupils are equal, round, and reactive to light. No scleral icterus.   Neck: No tracheal deviation present.   Cardiovascular: Normal rate and intact distal pulses.    Pulmonary/Chest: She has no wheezes. She has no rales.   Abdominal: She exhibits distension. There is tenderness. There is rebound. There is no guarding.   Musculoskeletal: She exhibits edema.   Neurological: No cranial nerve deficit.   Skin: Skin is warm and dry.   Psychiatric: She has a normal mood and affect.   Nursing note and vitals reviewed.      Medications  Current Facility-Administered Medications   Medication Dose Route Frequency Provider Last Rate Last Dose   • piperacillin-tazobactam (ZOSYN) 3.375 g in  mL IVPB  3.375 g Intravenous Q8HRS Juan Maravilla M.D. 25 mL/hr at 03/01/19 0602 3.375 g at 03/01/19 0602   • NS (BOLUS) infusion 500 mL  500 mL Intravenous Once PRN Kareem Solis M.D.       • norepinephrine (LEVOPHED) 8 mg in  mL Infusion  0-60 mcg/min Intravenous Continuous Juan Maravilla M.D. 63.8 mL/hr at 03/01/19 0603 34 mcg/min at 03/01/19 0603   • senna-docusate (PERICOLACE or SENOKOT S) 8.6-50 MG per tablet 2 Tab  2 Tab Oral BID Kareem Solis M.D.   Stopped at 03/01/19 0600    And   • polyethylene glycol/lytes (MIRALAX) PACKET 1 Packet  1 Packet Oral QDAY PRN Kareem Solis M.D.        And   • magnesium hydroxide (MILK OF MAGNESIA) suspension 30 mL  30 mL Oral QDAY PRN Kareem Solis M.D.        And   • bisacodyl (DULCOLAX) suppository 10 mg  10 mg Rectal QDAY PRN Kareem Solis M.D.   10 mg at 02/28/19 2221   • Respiratory Care per Protocol   Nebulization Continuous RT Kareem Solis M.D.       • NS infusion   Intravenous Continuous Kareem Solis M.D. 125 mL/hr at 02/28/19 2226     • heparin injection 5,000 Units  5,000 Units Subcutaneous  Q8HRS Kareem Solis M.D.   5,000 Units at 03/01/19 0602   • ondansetron (ZOFRAN) syringe/vial injection 4 mg  4 mg Intravenous Q4HRS PRN Kareem Solis M.D.   4 mg at 03/01/19 0137   • ondansetron (ZOFRAN ODT) dispertab 4 mg  4 mg Oral Q4HRS PRN Kareem Solis M.D.       • promethazine (PHENERGAN) tablet 12.5-25 mg  12.5-25 mg Oral Q4HRS PRN Kareem Solis M.D.       • promethazine (PHENERGAN) suppository 12.5-25 mg  12.5-25 mg Rectal Q4HRS PRN Kareem Solis M.D.       • prochlorperazine (COMPAZINE) injection 5-10 mg  5-10 mg Intravenous Q4HRS PRN Kareem Solis M.D.       • amitriptyline (ELAVIL) tablet 300 mg  300 mg Oral Nightly Kareem Solis M.D.   300 mg at 02/28/19 2221   • FLUoxetine (PROZAC) capsule 40 mg  40 mg Oral DAILY Kareem Solis M.D.   Stopped at 03/01/19 0600   • gabapentin (NEURONTIN) capsule 600 mg  600 mg Oral TID Kareem Solis M.D.   Stopped at 03/01/19 0600   • levothyroxine (SYNTHROID) tablet 150 mcg  150 mcg Oral AM ES Kareem Solis M.D.   Stopped at 03/01/19 0600   • simvastatin (ZOCOR) tablet 40 mg  40 mg Oral Nightly Kareem Solis M.D.   40 mg at 02/28/19 2221   • tizanidine (ZANAFLEX) tablet 2 mg  2 mg Oral TID PRN Kareem Solis M.D.       • insulin regular (HUMULIN R) injection 1-6 Units  1-6 Units Subcutaneous Q6HRS Kareem Solis M.D.   1 Units at 03/01/19 0624    And   • glucose 4 g chewable tablet 16 g  16 g Oral Q15 MIN PRN BRADEN Gayle.D.        And   • dextrose 50% (D50W) injection 25 mL  25 mL Intravenous Q15 MIN PRSUHA Solis M.D.           Fluids    Intake/Output Summary (Last 24 hours) at 03/01/19 0659  Last data filed at 03/01/19 0630   Gross per 24 hour   Intake          3099.39 ml   Output              560 ml   Net          2539.39 ml       Laboratory      Recent Labs      02/28/19   1822   TROPONINI  <0.01   BNPBTYPENAT  13     Recent Labs      02/28/19   1822  02/28/19   2330  03/01/19   0400   SODIUM  137  138   139   POTASSIUM  4.9  6.0*  5.7*   CHLORIDE  105  111  114*   CO2  14*  11*  13*   BUN  37*  40*  43*   CREATININE  1.83*  1.76*  1.83*   CALCIUM  8.3*  6.8*  7.5*     Recent Labs      02/28/19 1822 02/28/19 2330 03/01/19   0400   ALTSGPT  24  24  23   ASTSGOT  49*  53*  46*   ALKPHOSPHAT  283*  319*  315*   TBILIRUBIN  0.6  0.4  0.4   AMYLASE   --   885*   --    LIPASE   --   133*   --    GLUCOSE  291*  314*  174*     Recent Labs      02/28/19 1822 02/28/19 2330 03/01/19   0400   WBC  18.6*  13.9*  16.8*   NEUTSPOLYS  77.80*  84.40*   --    LYMPHOCYTES  17.50*  11.00*   --    MONOCYTES  2.30  3.20   --    EOSINOPHILS  0.70  0.10   --    BASOPHILS  0.80  0.40   --    ASTSGOT  49*  53*  46*   ALTSGPT  24  24  23   ALKPHOSPHAT  283*  319*  315*   TBILIRUBIN  0.6  0.4  0.4     Recent Labs      02/28/19 1822 02/28/19 2330 03/01/19   0400   RBC  5.36  4.77  4.87   HEMOGLOBIN  15.8  13.9  14.2   HEMATOCRIT  49.3*  44.7  44.7   PLATELETCT  288  267  248   PROTHROMBTM  17.8*  19.3*   --    APTT  39.5*   --    --    INR  1.45*  1.62*   --        Imaging  CT:    Reviewed    Assessment/Plan  * Septic shock (HCC)   Assessment & Plan    Continue sepsis protocols  Empiric abx  F/u cultures  Titrate levophed to maintain map > 65  Start vasopressin  Will consider steroids if further escalation  Source appears abdominal - seen by Surgery and ct reviewed      Urinary tract infection   Assessment & Plan    F/u Ucx  Continue abx     Acute encephalopathy   Assessment & Plan    Improving slightly  Still confused but appears less  Minimize mind altering/sedating meds     CN (constipation)   Assessment & Plan    Continues to have BMs  Discomfort starting to improve some     Lactic acidosis   Assessment & Plan    Trend     Abnormal LFTs   Assessment & Plan    Trend     JOSE (acute kidney injury) (HCC)   Assessment & Plan    Suspect atn/prerenal  Renally dose medications  Minimize nephrotoxic substances  Monitor urine  output     DM (diabetes mellitus) ([facility])   Assessment & Plan    Ssi + fsbs     Hypotension   Assessment & Plan    Shock from UTI and/or abdominal source. Continue to titrate fluids, pressors, possible ex lap   As per sepsis section          VTE:  Heparin  Ulcer: Not Indicated  Lines: Central Line  Ongoing indication addressed and [facility] Catheter  Ongoing indication addressed    I have performed a physical exam and reviewed and updated ROS and Plan today (3/1/2019). In review of yesterday's note (2/28/2019), there are no changes except as documented above.     Discussed patient condition and risk of morbidity and/or mortality with Hospitalist, RN, RT, Therapies, Pharmacy and Patient  The patient remains critically ill.  Critical care time = 35 minutes in directly providing and coordinating critical care and extensive data review.  No time overlap and excludes procedures.

## 2019-03-01 NOTE — H&P
Hospital Medicine History & Physical Note    Date of Service  2/28/2019    Primary Care Physician  Christiane Mclean M.D.    Consultants  PMA    Code Status  full    Chief Complaint  abd pain     History of Presenting Illness  63 y.o. female who presented 2/28/2019 with a medical history of hypertension hypothyroidism diabetes heartburn depression who presents with diffuse abdominal pain.  This patient's been having severe abdominal pain associated with radiation into the back.  She is also had nausea vomiting and multiple syncopal episodes earlier today.  She states her last bowel movement was 3 days ago.  EMS reports her blood pressure was in the 50s upon arrival.  Here in the emergency department her initial blood pressure was 75/51.  She has no known alleviating or exacerbating factors to her symptoms.  In the emergency department she is found to be septic with a urinary source of infection.  She is also notably constipated on CT scan and will be admitted to the hospital for further management.    Review of Systems  Review of Systems   Constitutional: Negative for chills and fever.   HENT: Negative for congestion, hearing loss and tinnitus.    Eyes: Negative for blurred vision, double vision and discharge.   Respiratory: Negative for cough, hemoptysis and shortness of breath.    Cardiovascular: Negative for chest pain, palpitations and leg swelling.   Gastrointestinal: Positive for abdominal pain, constipation and nausea. Negative for heartburn and vomiting.   Genitourinary: Negative for dysuria and flank pain.   Musculoskeletal: Positive for back pain. Negative for joint pain and myalgias.   Skin: Negative for rash.   Neurological: Positive for loss of consciousness. Negative for dizziness, sensory change, speech change, focal weakness and weakness.   Endo/Heme/Allergies: Negative for environmental allergies. Does not bruise/bleed easily.   Psychiatric/Behavioral: Negative for depression, hallucinations and  "substance abuse.       Past Medical History   has a past medical history of Arthritis; Cancer ([facility]) ([facility]) (1982); Dental disorder; Diabetes ([facility]) ([facility]); Disorder of thyroid; Heart burn; and Hypertension.    Surgical History   has a past surgical history that includes cholecystectomy; gyn surgery; other (2009); other orthopedic surgery; lumbar laminectomy diskectomy (10/20/2017); foraminotomy (Bilateral, 10/20/2017); and lumbar fusion o-arm (10/20/2017).     Family History  Reviewed and not pertinent     Social History   reports that she has been smoking Cigarettes.  She has a 47.00 pack-year smoking history. She has never used smokeless tobacco. She reports that she does not drink alcohol or use drugs.    Allergies  Allergies   Allergen Reactions   • Dilaudid [Hydromorphone Hcl] Swelling   • Tramadol      \"loose short term memory\"       Medications  Prior to Admission Medications   Prescriptions Last Dose Informant Patient Reported? Taking?   Ascorbic Acid (VITAMIN C) 1000 MG Tab  Patient Yes No   Sig: Take 1 Tab by mouth 2 Times a Day.   Docusate Calcium (STOOL SOFTENER PO)  Patient Yes No   Sig: Take 1 Tab by mouth 2 Times a Day.   SUPER B COMPLEX/C PO  Patient Yes No   Sig: Take 1 Tab by mouth 2 Times a Day.   amitriptyline (ELAVIL) 150 MG Tab  Patient Yes No   Sig: Take 300 mg by mouth every evening.   fluoxetine (PROZAC) 40 MG capsule  Patient Yes No   Sig: Take 40 mg by mouth every day.   gabapentin (NEURONTIN) 600 MG tablet  Patient Yes No   Sig: Take 600 mg by mouth 3 times a day.   hydrocodone-acetaminophen (NORCO) 5-325 MG Tab per tablet  Patient Yes No   Sig: Take 1-2 Tabs by mouth as needed.   hydrocodone-acetaminophen (NORCO) 5-325 MG Tab per tablet   No No   Sig: Take 1 Tab by mouth every four hours as needed.   levothyroxine (SYNTHROID) 150 MCG Tab  Patient Yes No   Sig: Take 150 mcg by mouth Every morning on an empty stomach.   lisinopril (PRINIVIL, ZESTRIL) 40 MG tablet  Patient Yes No "   Sig: Take 40 mg by mouth every day.   loratadine (CLARITIN) 10 MG Tab  Patient Yes No   Sig: Take 10 mg by mouth every day.   metformin (GLUCOPHAGE) 1000 MG tablet  Patient Yes No   Sig: Take 1,000 mg by mouth 2 times a day, with meals.   metoprolol SR (TOPROL XL) 25 MG TABLET SR 24 HR  Patient Yes No   Sig: Take 25 mg by mouth every day.   senna-docusate (PERICOLACE OR SENOKOT S) 8.6-50 MG Tab   Yes No   Sig: Take 1 Tab by mouth every 24 hours as needed for Constipation.   simvastatin (ZOCOR) 40 MG Tab  Patient Yes No   Sig: Take 40 mg by mouth every evening.   tizanidine (ZANAFLEX) 2 MG tablet   No No   Sig: Take 1 Tab by mouth 3 times a day as needed (spasm).      Facility-Administered Medications: None       Physical Exam  Temp:  [36.5 °C (97.7 °F)] 36.5 °C (97.7 °F)  Pulse:  [55-64] 58  Resp:  [15-29] 18  BP: (58-75)/(26-51) 75/51  SpO2:  [95 %-100 %] 99 %    Physical Exam   Constitutional: She is oriented to person, place, and time. She appears well-developed and well-nourished. No distress.   HENT:   Head: Normocephalic and atraumatic.   Dry oral mucosa   Eyes: Pupils are equal, round, and reactive to light. Conjunctivae and EOM are normal.   Neck: Normal range of motion. Neck supple. No JVD present.   Cardiovascular: Regular rhythm, normal heart sounds and intact distal pulses.    No murmur heard.  tachycardia   Pulmonary/Chest: Effort normal and breath sounds normal. No respiratory distress. She has no wheezes.   Abdominal: Soft. Bowel sounds are normal. She exhibits distension. There is no tenderness.   Suprapubic ttp   Musculoskeletal: Normal range of motion. She exhibits edema.   Neurological: She is alert and oriented to person, place, and time. She exhibits normal muscle tone.   Skin: Skin is warm and dry.   Psychiatric: She has a normal mood and affect. Her behavior is normal. Judgment and thought content normal.   Nursing note and vitals reviewed.      Laboratory:  Recent Labs      02/28/19   8849    WBC  18.6*   RBC  5.36   HEMOGLOBIN  15.8   HEMATOCRIT  49.3*   MCV  92.0   MCH  29.5   MCHC  32.0*   RDW  47.3   PLATELETCT  288   MPV  9.3     Recent Labs      02/28/19 1822   SODIUM  137   POTASSIUM  4.9   CHLORIDE  105   CO2  14*   GLUCOSE  291*   BUN  37*   CREATININE  1.83*   CALCIUM  8.3*     Recent Labs      02/28/19 1822   ALTSGPT  24   ASTSGOT  49*   ALKPHOSPHAT  283*   TBILIRUBIN  0.6   GLUCOSE  291*     Recent Labs      02/28/19 1822   APTT  39.5*   INR  1.45*     Recent Labs      02/28/19 1822   BNPBTYPENAT  13         Recent Labs      02/28/19 1822   TROPONINI  <0.01       Urinalysis:    Recent Labs      02/28/19 1953   SPECGRAVITY  1.033   GLUCOSEUR  Negative   KETONES  Trace*   NITRITE  Negative   LEUKESTERAS  Moderate*        Imaging:  DX-CHEST-PORTABLE (1 VIEW)   Final Result      No acute cardiopulmonary abnormality.      CT-CTA COMPLETE THORACOABDOMINAL AORTA   Final Result      1. No evidence of aortic dissection or aneurysm. No high-grade stenosis or occlusion of major pelvic branches vessels.   2. No pulmonary embolus.   3. Moderate to large amount of stool throughout the colon.            DX-CHEST-LIMITED (1 VIEW)    (Results Pending)         Assessment/Plan:  I anticipate this patient will require at least two midnights for appropriate medical management, necessitating inpatient admission.    * Septic shock (HCC)   Assessment & Plan    This is severe sepsis with the following associated acute organ dysfunction(s): acute kidney failure.   Due to Urinary source of infection   IV abx for now   Hypotensive despite 30cc/kg bolus, placed on Levophed and titrated to map >65   Follow cultures   Trend lactic   descilate therapy as appropriate     CN (constipation)   Assessment & Plan    Cont with IVF and bowel regimine     Lactic acidosis   Assessment & Plan    IVF and trend     Abnormal LFTs   Assessment & Plan    Cont with IVF and monitor     JOSE (acute kidney injury) (HCC)    Assessment & Plan    Pre renal etiology, sepsis   Cont with IVF  Avoid nephrotoxics   Monitor renal function      DM (diabetes mellitus) ([facility])   Assessment & Plan    SSI ordered  Cont accu checks     Hypotension   Assessment & Plan    Due to sepsis, cont on Levophed titrate to map >65 wean as tolerated  continous IVF     Depression   Assessment & Plan    Resume home prozac and elavil     Urinary tract infection   Assessment & Plan    IV abx   Follow cultures  descilate therapy as appropriate         VTE prophylaxis: heparin    I spent a total of 35 minutes of critical care time during this clinical encounter. This time includes no procedures or overlap with other providers.

## 2019-03-01 NOTE — ED NOTES
Patient arrives via ems from home. Sudden onset of abdominal pain and back pain at about 1740. States she had just eaten before pain started. EMS noted BP on left arm to be 110/74 and right arm to be 63/41. +pedal pulses. No hx of aneurysm. BP called to bedside  - code aorta called.   FSBS 300 by ems. Pt received 500 ml of LVF prior to arrival.

## 2019-03-01 NOTE — ASSESSMENT & PLAN NOTE
Shock from UTI and/or abdominal source. Continue to titrate fluids, pressors, possible ex lap   As per sepsis section

## 2019-03-01 NOTE — PROGRESS NOTES
Updated Dr. Patiño regarding elevated lactic of 3.5 orders received to continue trending lactic acid levels q6 til they decrease. Notified Dr. Patiño of +occult stool.

## 2019-03-01 NOTE — CARE PLAN
Problem: Safety  Goal: Will remain free from injury    Intervention: Provide assistance with mobility  Safety measures in place. Close to nursing station, bed alarm in use, call light in reach      Problem: Pain Management  Goal: Pain level will decrease to patient's comfort goal    Intervention: Follow pain managment plan developed in collaboration with patient and Interdisciplinary Team  Working with Dr. Shipley and pt to ensure pain addressed        Problem: Hemodynamic Status  Goal: Vital Signs and Fluid Balance Management    Intervention: Hemodynamic Monitoring  Monitoring q15 mins, on vasopressin and Levophed.

## 2019-03-01 NOTE — ASSESSMENT & PLAN NOTE
Suspect due to sepsis and metabolic  MRI without evidence of acute stroke  Ammonia level normal  Her encephalopathy appears to have resolved  Limit sedatives

## 2019-03-01 NOTE — THERAPY
PT consult received. Per RN, awaiting imaging for possible CVA due to new onset of confusion and vision changes. RN requesting PT hold until work-up complete. Will complete eval as able and appropriate.

## 2019-03-01 NOTE — ASSESSMENT & PLAN NOTE
Improving; no longer tympanic. Having BMs, no ileus or obstruction. 2 watery BMs since this AM.  - GI signed off, no endo intervention needed  - S/p neostigmine with good result x2 while in the ICU  - Advance diet as tolerated  - bowel regimen PRN

## 2019-03-01 NOTE — PROGRESS NOTES
Lab called with critical result of lactic Acid of 5.6 at 0030. Critical lab result read back to Leonides STRATTON.   Dr. Maravilla notified of critical lab result at 0035.  Critical lab result read back by Dr. Maravilla.

## 2019-03-01 NOTE — ED NOTES
Report from Rosie IBARRA. Pt sitting up in [facility], A&O x4. Pt reports mild dizziness. BP 87/49. Sepsis bolus infusing per orders. Updated pt and family on POC. Call light in reach.

## 2019-03-01 NOTE — ED PROVIDER NOTES
ED Provider Note    Scribed for Omer Neff D.O. by Rebeca Rivers. 2/28/2019  6:17 PM    Primary care provider: Christiane Mclean M.D.  Means of arrival: EMS  History obtained from: Patient  History limited by: None    CHIEF COMPLAINT  Chief Complaint   Patient presents with   • Back Pain   • Abdominal Pain   • Syncope       HPI  Sonya Segovia is a 63 y.o. female who presents to the Emergency Department via EMS for evaluation of severe, gradual back, and abdominal pain with associated nausea, vomiting and syncope onset at 1740 today. She further reports constipation for the last several days, and states she has not had a bowel movement in three days.The patient reports she had just eaten before the pain stared. She presents with a low blood pressure of 75/51 upon assessment. She has a history of gall bladder surgery, but denies any complications with her aorta in the past. The patient additionally reports no history of heart disease, and does not currently take any blood thinners. Patient denies dysuria, hematochezia, melena, fever, loss of feeling or strength in her legs. No alleviating or exacerbating factors identified at this time.     REVIEW OF SYSTEMS  Pertinent positives include back, abdominal pain, syncope, nausea, vomiting, constipation. Pertinent negatives include hematochezia, melena, fever, fever, dysuria, loss of feeling or strength to legs.  All other systems reviewed and negative.    PAST MEDICAL HISTORY  Past Medical History:   Diagnosis Date   • Arthritis     hands, knees   • Cancer ([facility]) (Formerly [facility]) 1982    cervical   • Dental disorder    • Diabetes ([facility]) (Formerly [facility])    • Disorder of thyroid     goiter   • Heart burn    • Hypertension        SURGICAL HISTORY  Past Surgical History:   Procedure Laterality Date   • LUMBAR LAMINECTOMY DISKECTOMY  10/20/2017    Procedure: LUMBAR 2 - 3 LAMINECTOMY;  Surgeon: Saud Mehta M.D.;  Location: SURGERY [facility];  Service: Neurosurgery    • FORAMINOTOMY Bilateral 10/20/2017    Procedure: BILATERAL LUMBAR 2-3 FORAMINOTOMY and BILATERAL LUMBAR 5 - SACRAL 1 LAMINOFORAMINOTOMY;  Surgeon: Saud Mehta M.D.;  Location: SURGERY [facility];  Service: Neurosurgery   • LUMBAR FUSION O-ARM  10/20/2017    Procedure: BILATERAL PEDICLE SCREW PLACEMENT at LUMBAR 2 - 3,  LEFT O-ARM INTRAOPERATIVE NAVIGATION,  AUTOGRAFT BONE PLACEMENT LUMBAR 2 - 3 at INTERTRANSVERSE PROCESS SPACE, SSEP, MEP NEUROMONITORING;  Surgeon: Saud Mehta M.D.;  Location: SURGERY [facility];  Service: Neurosurgery   • OTHER  2009    thyroidectomy   • CHOLECYSTECTOMY     • GYN SURGERY      hysterectomy   • OTHER ORTHOPEDIC SURGERY      amada shoulder ssurgery        SOCIAL HISTORY  Social History   Substance Use Topics   • Smoking status: Current Every Day Smoker     Packs/day: 1.00     Years: 47.00     Types: Cigarettes   • Smokeless tobacco: Never Used   • Alcohol use No      History   Drug Use No       FAMILY HISTORY  No family history noted    CURRENT MEDICATIONS  Home Medications     Reviewed by Sariah Mclean R.N. (Registered Nurse) on 02/28/19 at 1815  Med List Status: Partial   Medication Last Dose Status   amitriptyline (ELAVIL) 150 MG Tab  Active   Ascorbic Acid (VITAMIN C) 1000 MG Tab  Active   Docusate Calcium (STOOL SOFTENER PO)  Active   fluoxetine (PROZAC) 40 MG capsule  Active   gabapentin (NEURONTIN) 600 MG tablet  Active   hydrocodone-acetaminophen (NORCO) 5-325 MG Tab per tablet  Active   hydrocodone-acetaminophen (NORCO) 5-325 MG Tab per tablet  Active   levothyroxine (SYNTHROID) 150 MCG Tab  Active   lisinopril (PRINIVIL, ZESTRIL) 40 MG tablet  Active   loratadine (CLARITIN) 10 MG Tab  Active   metformin (GLUCOPHAGE) 1000 MG tablet  Active   metoprolol SR (TOPROL XL) 25 MG TABLET SR 24 HR  Active   senna-docusate (PERICOLACE OR SENOKOT S) 8.6-50 MG Tab  Active   simvastatin (ZOCOR) 40 MG Tab  Active   SUPER B COMPLEX/C PO  Active   tizanidine  "(ZANAFLEX) 2 MG tablet  Active                ALLERGIES  Allergies   Allergen Reactions   • Dilaudid [Hydromorphone Hcl] Swelling   • Tramadol      \"loose short term memory\"       PHYSICAL EXAM  VITAL SIGNS: BP (!) 75/51   Pulse 60   Resp 20   Ht 1.727 m (5' 8\")   Wt 113.9 kg (251 lb)   SpO2 97%   BMI 38.16 kg/m²     Nursing notes and vitals reviewed.  Constitutional: Well developed, Well nourished, Moderate distress, Non-toxic appearance.   Eyes: PERRLA, EOMI, Conjunctiva normal, No discharge.   Cardiovascular: Tachycardic No murmurs, No rubs, No gallops.   Thorax & Lungs: No respiratory distress, No rales, No rhonchi, No wheezing, No chest tenderness.   Abdomen: Diffuse abdominal tenderness Bowel sounds normal, Soft, No guarding, No rebound, No masses, No pulsatile masses.   Skin: Warm, Dry, No erythema, No rash.   Musculoskeletal: Decreased pulses bilaterally No edema, No cyanosis, No clubbing. Good range of motion in all major joints. No tenderness to palpation or major deformities noted, no CVA tenderness, no midline back tenderness.   : No Melena no hematochyzia   Neurologic: Alert & oriented x 3, Normal motor function, Normal sensory function, No focal deficits noted.  Psychiatric: Affect normal for clinical presentation.    DIAGNOSTIC STUDIES/PROCEDURES    LABS  Results for orders placed or performed during the hospital encounter of 02/28/19   TROPONIN   Result Value Ref Range    Troponin I <0.01 0.00 - 0.04 ng/mL   BTYPE NATRIURETIC PEPTIDE   Result Value Ref Range    B Natriuretic Peptide 13 0 - 100 pg/mL   CBC WITH DIFFERENTIAL   Result Value Ref Range    WBC 18.6 (H) 4.8 - 10.8 K/uL    RBC 5.36 4.20 - 5.40 M/uL    Hemoglobin 15.8 12.0 - 16.0 g/dL    Hematocrit 49.3 (H) 37.0 - 47.0 %    MCV 92.0 81.4 - 97.8 fL    MCH 29.5 27.0 - 33.0 pg    MCHC 32.0 (L) 33.6 - 35.0 g/dL    RDW 47.3 35.9 - 50.0 fL    Platelet Count 288 164 - 446 K/uL    MPV 9.3 9.0 - 12.9 fL    Neutrophils-Polys 77.80 (H) 44.00 - " 72.00 %    Lymphocytes 17.50 (L) 22.00 - 41.00 %    Monocytes 2.30 0.00 - 13.40 %    Eosinophils 0.70 0.00 - 6.90 %    Basophils 0.80 0.00 - 1.80 %    Immature Granulocytes 0.90 0.00 - 0.90 %    Nucleated RBC 0.10 /100 WBC    Neutrophils (Absolute) 14.47 (H) 2.00 - 7.15 K/uL    Lymphs (Absolute) 3.24 1.00 - 4.80 K/uL    Monos (Absolute) 0.42 0.00 - 0.85 K/uL    Eos (Absolute) 0.13 0.00 - 0.51 K/uL    Baso (Absolute) 0.14 (H) 0.00 - 0.12 K/uL    Immature Granulocytes (abs) 0.16 (H) 0.00 - 0.11 K/uL    NRBC (Absolute) 0.02 K/uL   COMP METABOLIC PANEL   Result Value Ref Range    Sodium 137 135 - 145 mmol/L    Potassium 4.9 3.6 - 5.5 mmol/L    Chloride 105 96 - 112 mmol/L    Co2 14 (L) 20 - 33 mmol/L    Anion Gap 18.0 (H) 0.0 - 11.9    Glucose 291 (H) 65 - 99 mg/dL    Bun 37 (H) 8 - 22 mg/dL    Creatinine 1.83 (H) 0.50 - 1.40 mg/dL    Calcium 8.3 (L) 8.5 - 10.5 mg/dL    AST(SGOT) 49 (H) 12 - 45 U/L    ALT(SGPT) 24 2 - 50 U/L    Alkaline Phosphatase 283 (H) 30 - 99 U/L    Total Bilirubin 0.6 0.1 - 1.5 mg/dL    Albumin 3.6 3.2 - 4.9 g/dL    Total Protein 6.6 6.0 - 8.2 g/dL    Globulin 3.0 1.9 - 3.5 g/dL    A-G Ratio 1.2 g/dL   APTT (PTT)   Result Value Ref Range    APTT 39.5 (H) 24.7 - 36.0 sec   PROTHROMBIN TIME (INR)   Result Value Ref Range    PT 17.8 (H) 12.0 - 14.6 sec    INR 1.45 (H) 0.87 - 1.13   HCG QUAL SERUM   Result Value Ref Range    Beta-Hcg Qualitative Serum Negative Negative   COD - Adult (Type and Screen)   Result Value Ref Range    ABO Grouping Only B     Rh Grouping Only NEG     Antibody Screen-Cod NEG    ABO AND RH CONFIRMATION   Result Value Ref Range    ABO Confirm B     Second Rh Group NEG    URINALYSIS,CULTURE IF INDICATED   Result Value Ref Range    Color DK Yellow     Character Turbid (A)     Specific Gravity 1.033 <1.035    Ph 5.0 5.0 - 8.0    Glucose Negative Negative mg/dL    Ketones Trace (A) Negative mg/dL    Protein 300 (A) Negative mg/dL    Bilirubin Small (A) Negative    Urobilinogen,  Urine 1.0 Negative    Nitrite Negative Negative    Leukocyte Esterase Moderate (A) Negative    Occult Blood Trace (A) Negative    Micro Urine Req Microscopic    LACTIC ACID   Result Value Ref Range    Lactic Acid 5.6 (HH) 0.5 - 2.0 mmol/L   ESTIMATED GFR   Result Value Ref Range    GFR If  34 (A) >60 mL/min/1.73 m 2    GFR If Non  28 (A) >60 mL/min/1.73 m 2   URINE MICROSCOPIC (W/UA)   Result Value Ref Range    WBC Packed (A) /hpf    RBC 5-10 (A) /hpf    Bacteria Many (A) None /hpf    Epithelial Cells Few /hpf    Hyaline Cast 0-2 /lpf   EKG   Result Value Ref Range    Report       [facility]    Test Date:  2019  Pt Name:    KARYNA SHRESTHA                    Department: ER  MRN:        [id number]                      Room:       RD 12  Gender:     Female                       Technician: [id number]  :        1955                   Requested By:ER TRIAGE PROTOCOL  Order #:    [id number]                    Reading MD: PAYAM LANTIGUA DO    Measurements  Intervals                                Axis  Rate:       60                           P:          41  NH:         204                          QRS:        79  QRSD:       96                           T:          73  QT:         432  QTc:        432    Interpretive Statements  SINUS RHYTHM  ANTEROLATERAL INFARCT, AGE INDETERMINATE  Compared to ECG 10/22/2017 01:09:44  Myocardial infarct finding now present    Electronically Signed On 2019 18:23:29 PST by PAYAM LANTIGUA DO     EKG   Result Value Ref Range    Report       [facility]    Test Date:  2019  Pt Name:    KARYNA SHRESTHA                    Department: ER  MRN:        [id number]                      Room:       RD 12  Gender:     Female                       Technician: [id number]  :        1955                   Requested By:PAYAM LANTIGUA  Order #:    [id number]                     Reading MD:    Measurements  Intervals                                Axis  Rate:       55                           P:          62  DC:         212                          QRS:        73  QRSD:       102                          T:          77  QT:         480  QTc:        460    Interpretive Statements  SINUS BRADYCARDIA  BORDERLINE AV CONDUCTION DELAY  ANTERIOR INFARCT, AGE INDETERMINATE  Compared to ECG 02/28/2019 18:11:49  Sinus rhythm no longer present  Myocardial infarct finding still present         All labs reviewed by me.    RADIOLOGY  DX-CHEST-PORTABLE (1 VIEW)   Final Result      No acute cardiopulmonary abnormality.      CT-CTA COMPLETE THORACOABDOMINAL AORTA   Final Result      1. No evidence of aortic dissection or aneurysm. No high-grade stenosis or occlusion of major pelvic branches vessels.   2. No pulmonary embolus.   3. Moderate to large amount of stool throughout the colon.            DX-CHEST-LIMITED (1 VIEW)    (Results Pending)     The radiologist's interpretation of all radiological studies have been reviewed by me.  Central Line Placement Procedure Note ultrasound-guided  Indication: vascular access hypovolemia, frequent blood draws    Consent: The patient was counseled regarding the procedure, its indications, risks, potential complications and alternatives, and any questions were answered. Consent was obtained to proceed.    Procedure: The patient was positioned appropriately and the skin over the right internal jugular vein was prepped with Chloraprep. Local anesthesia was obtained by infiltration using 1% Lidocaine without epinephrine.  A large bore needle was used to identify the vein.  A guide wire was then inserted into the vein through the needle. A triple lumen catheter was then inserted into the vessel over the guide wire using the Seldinger technique.  All ports showed good, free flowing blood return and were flushed with saline solution.  The catheter was then securely fastened  to the skin with sutures and covered with a sterile dressing.  A post procedure X-ray was ordered and showed good line position.    The patient tolerated the procedure well.    Complications: None    COURSE & MEDICAL DECISION MAKING  Pertinent Labs & Imaging studies reviewed. (See chart for details)    6:17 PM - Patient seen and examined at bedside. Patient will be treated with Omnipaque 350mg/mL. Ordered dx chest, ct CTA complete thoracoabdominal aorta, troponin, bnp, CBC, CMP, APTT, PTT, hcg qual serum, ABO and RH confirmation, EKG to evaluate her symptoms.   This is a charming 63 y.o. female that presents with severe back pain, abdominal pain and profound hypotension.  Per the patient and EMS, the patient's had near syncopal episodes x3 today, complaining of severe back pain that has been increasing severity over the last 24 hours now severe abdominal pain.  Per EMS her blood pressure was 50 systolic on arrival.  I was called the patient's bedside as the patient is in critical condition with a blood pressure of 50 systolic.  2 IVs established bilateral, she started IV fluid resuscitation.  As concern for a abdominal aortic aneurysm rupture versus dissection as the patient could be hypotension and bedside ultrasound/fast was initially negative for free fluid.  For this reason, I initiated code aorta.  The patient was taken straight to the CT scanner and fortunately the patient has no evidence of aortic dissection, aortic aneurysm or significant intra-abdominal abnormality such as mesenteric ischemia, bowel obstruction, perforated bowel, diverticular abscess or diverticulitis, free air.  Following some return to the emergency department, she continued be hypotensive requiring fluid resuscitation with 30 mL/kg of normal saline IV fluid.  In addition, she had a lactic acidosis of 5, leukocytosis 18,600 with a left-sided shift.  The patient had a Crabtree catheter inserted had no fluid return I believe the patient was  exquisitely hypotensive and dehydrated therefore she continue with IV fluid resuscitation.  She received empiric antibiotics in form of ceftriaxone 2 g IV for presumed urinary tract infection/pyelonephritis, urosepsis.  Following fluid resuscitation, IV antibiotics of central venous catheter access was obtained by myself without complication.  The patient continued to be hypotensive and septic shock after fluid resuscitation therefore she was placed on Levophed vasopressor.  Patient does have evidence of slight acute kidney injury with a slightly elevated creatinine 1.83 as well as decreased GFR.  I believe the necessity for acute CT scan for ruling out dissection and aortic aneurysm was appropriate.  On reevaluation, the patient received IV fluid resuscitation, IV antibiotics, vasopressors and will be admitted to Dr. Solis in critical condition.      6:35 PM- CT done.    6:45 PM- Paged radiology who confirmed her results showed no AAA.  Patient was reevaluated at bedside. Discussed radiology  results with the patient and informed them that of the results above. Her blood pressure was found to be 80 systolic. Repeat EKG completed negative for STEMI.     6:57 PM Patient was reevaluated at bedside. Discussed lab  and radiology  results with the patient and informed them of the results above. Blood pressure was improved.    HYDRATION: Based on the patient's presentation of Sepsis the patient was given IV fluids. IV Hydration was used because oral hydration was not adequate alone. Upon recheck following hydration, the patient was continued to be hypotensive requiring vasopressors and continuation of IV fluid administration..      DISPOSITION:  Patient will be admitted to hospital in guarded condition.    CRITICAL CARE  The very real possibilty of a deterioration of this patient's condition required the highest level of my preparedness for sudden, emergent intervention.  I provided critical care services, which included  medication orders, frequent reevaluations of the patient's condition and response to treatment, ordering and reviewing test results, and discussing the case with various consultants.  The critical care time associated with the care of the patient was 35 minutes. Review chart for interventions. This time is exclusive of any other billable procedures.     FINAL IMPRESSION  Urosepsis  Septic shock  Acute kidney injury  Central venous access  Critical care time 35 minutes     Rebeca [facility] (Scribfranklyn), am scribing for, and in the presence of, Omer Neff D.O    Electronically signed by: Rebeca Rivers (Esther), 2/28/2019    IOmer D.O. personally performed the services described in this documentation, as scribed by Rebeca Rivers in my presence, and it is both accurate and complete. C.     The note accurately reflects work and decisions made by me.  Omer Neff  2/28/2019  9:08 PM

## 2019-03-01 NOTE — ASSESSMENT & PLAN NOTE
Takes metformin at home. A1C 6.9%  - Holding oral hypoglycemics while inpatient  - No coverage needed; no ssi

## 2019-03-01 NOTE — PROGRESS NOTES
Surgery General Daily Progress Note    Date of Service  3/1/2019    Chief Complaint  63 y.o. female admitted 2/28/2019 with constipation and urinary tract infection    Hospital Course    General surgery consulted last evening for abdominal pain and distention.  Repeat CT scan with Gastrografin resulted in multiple bowel movements and decreased distention.  There was no evidence of intestinal ischemia or perforation on both scans.      Interval Problem Update  Remains quite ill    Consultants/Specialty  Surgery    Code Status  See primary team H&P    Disposition  Will remain in the ICU    Review of Systems  Review of Systems   Gastrointestinal: Positive for abdominal pain and diarrhea. Negative for vomiting.        Physical Exam  Temp:  [36.4 °C (97.5 °F)-36.5 °C (97.7 °F)] 36.4 °C (97.5 °F)  Pulse:  [40-92] 92  Resp:  [15-70] 27  BP: (58-91)/(26-57) 91/57  SpO2:  [79 %-100 %] 93 %    Physical Exam   HENT:   Head: Normocephalic and atraumatic.   Cardiovascular: Normal rate.    Pulmonary/Chest: No respiratory distress.   Abdominal: She exhibits distension. There is tenderness. There is no rebound and no guarding.   Less distended and softer than early this morning/last night       Fluids    Intake/Output Summary (Last 24 hours) at 03/01/19 1012  Last data filed at 03/01/19 0630   Gross per 24 hour   Intake          3099.39 ml   Output              560 ml   Net          2539.39 ml       Laboratory  Recent Labs      02/28/19   1822  02/28/19   2330  03/01/19   0400   WBC  18.6*  13.9*  16.8*   RBC  5.36  4.77  4.87   HEMOGLOBIN  15.8  13.9  14.2   HEMATOCRIT  49.3*  44.7  44.7   MCV  92.0  93.7  91.8   MCH  29.5  29.1  29.2   MCHC  32.0*  31.1*  31.8*   RDW  47.3  47.8  47.8   PLATELETCT  288  267  248   MPV  9.3  9.5  9.3     Recent Labs      02/28/19   1822  02/28/19   2330  03/01/19   0400   SODIUM  137  138  139   POTASSIUM  4.9  6.0*  5.7*   CHLORIDE  105  111  114*   CO2  14*  11*  13*   GLUCOSE  291*  314*  174*    BUN  37*  40*  43*   CREATININE  1.83*  1.76*  1.83*   CALCIUM  8.3*  6.8*  7.5*     Recent Labs      02/28/19 1822  02/28/19   2330   APTT  39.5*   --    INR  1.45*  1.62*     Recent Labs      02/28/19 1822   BNPBTYPENAT  13           Imaging  CT-ABDOMEN-PELVIS W/O   Final Result      Decrease in volume of colonic stool with no abnormal bowel dilatation detected      New third spacing with some new mild abdominopelvic ascites. No free air is seen to indicate extravasation. No loculated fluid collection to suggest abscess is noted      No pneumatosis detected to suggest bowel ischemia         DX-ABDOMEN FOR TUBE PLACEMENT   Final Result      Enteric tube terminates over the gastroduodenal junction.      DX-CHEST-LIMITED (1 VIEW)   Final Result      New right IJ line tip overlies the SVC      No radiographic evidence of acute cardiopulmonary process.      DX-CHEST-PORTABLE (1 VIEW)   Final Result      No acute cardiopulmonary abnormality.      CT-CTA COMPLETE THORACOABDOMINAL AORTA   Final Result      1. No evidence of aortic dissection or aneurysm. No high-grade stenosis or occlusion of major pelvic branches vessels.   2. No pulmonary embolus.   3. Moderate to large amount of stool throughout the colon.                 Assessment/Plan  1.  Patient has had multiple bowel movements and is subjectively improved.  Her examination is improved as her abdomen is less distended.    2.  No intra-abdominal pathology noted on 2 CT scans.  CT enterography with no perforation or evidence of ischemia.  While the patient is quite ill there is no indication for abdominal exploration at this point.    3.  Recommend aggressive treatment of UTI with broad-spectrum antibiotics.    4.  Call with questions.    VTE prophylaxis: see chart

## 2019-03-01 NOTE — PROGRESS NOTES
Dr. Shipley notified of pt c/o blurry vision and continued confusion. Orders received for stat CT.

## 2019-03-01 NOTE — ED NOTES
Floor called and notified of transport, report to STACEY Coyle.  Sonya Segovia transported to [facility] via [facility] with RN. All personal belongings in possession.  NAD noted.

## 2019-03-01 NOTE — PROGRESS NOTES
At 2150 on 2/28/19 the patient was brought to ICU on a stretcher with family at bedside. Patient was awake, alert and oriented to person, place, time and situation with moments of confusion. Only main complaint was of lower abdominal pain and as per patient she has not had a bowel movement in at least 3 days. Abdomen distended, firm and very tender to touch. CTA of abdomen was done before transfer to ICU. Patient arrived on levophed drip at 4 mcg/min with SBP in the 90's. By 0000 on 3/1/19 the levophed drip was already at 20 mcg/min with NS running at 125 ml/hr as ordered. At 0030 lab called with a lactic acid of 5.6, it had increased. At 0035 spoke to Dr. Maravilla to give an update on the patient's condition. Informed him the patient has now had 3 bowel movements, no decrease in pain, abdomen remains distented and tender, increased lactic acid, and dropping blood pressure. At 0040 Dr. Maravilla was at the bedside assessing the patient and ordered a 1 liter bolus of LR, a repeat lactic acid and increased levophed maximum titration rate to 60mcg/min. At this time he spoke with sister of the patient and niece and informed them he would be consulting surgery. At 0050 Dr. Gama, the surgeon on call, was at the bedside assessing the patient. At this time he informed the patient and her family that he does not think the problem is a gastrointestinal issue, instead it is a infection issue. He offered to perform a exploratory laproscopic surgery, a CT of the abdomen with oral contrast instead of IV or to do nothing and see if the current therapy works. The patient and family opted for the repeat abdominal CT. Md ordered to insert a NGT, suction the contents and then give the oral contrast via NGT. At 0100 the NGT was inserted and patient tolerated very well. At 0130 the contrast was brought up and was given at that time. PRN zofran was given as well to prevent nausea. Patient was also slowly becoming more and more confused.  "Informed Dr. Maravilla and asked if he wanted to perform a head CT as well since the patient stated she had fallen in the last day and hit her head. MD said no. At 0300 patient was taken to CT and tolerated well. Patient now resting in bed, confused, pulling at wires and on levophed at 36mcg/min. Blood pressure is starting to stabilize and patient states \"it doesn't hurt as much.\" No longer as painful to touch on assessment. Patient has now had a total of 8 bowel movements, brown and loose. Will continue to monitor.    "

## 2019-03-01 NOTE — CONSULTS
"General Surgery Consult    CHIEF COMPLAINT: Abdominal pain.     HISTORY OF PRESENT ILLNESS: The patient is a 63 y.o. female, who presents with abdominal pain.  The patient has had fairly severe abdominal pain with her last bowel movement being 3 days ago.  She has had several episodes of nausea and vomiting with syncope.  The patient presented to the emergency department via EMS and was noted to have a low blood pressure on arrival.  During her workup in the emergency department she was found to have a urinary tract infection and a CT scan revealed significant constipation without evidence of obstruction or inflammation.  She was noted to have a white count of 18,000.  She was admitted to the hospital service into the ICU for closer monitoring.  The ICU physician felt the patient's pain was too significant for constipation and her pressor requirement was increasing.  A general surgery consult was urgently obtained late this evening.  She did have a bowel movement this evening.    PAST MEDICAL HISTORY:  has a past medical history of Arthritis; Cancer (CMS-[facility]) ([facility]) (1982); Dental disorder; Diabetes (CMS-[facility]) ([facility]); Disorder of thyroid; Heart burn; and Hypertension.     PAST SURGICAL HISTORY:  has a past surgical history that includes cholecystectomy; gyn surgery; other (2009); other orthopedic surgery; lumbar laminectomy diskectomy (10/20/2017); foraminotomy (Bilateral, 10/20/2017); and lumbar fusion o-arm (10/20/2017).     ALLERGIES:   Allergies   Allergen Reactions   • Hydromorphone      Swelling   • Tramadol      \"loose short term memory\"        CURRENT MEDICATIONS:   Home Medications     Reviewed by Bertram Whitehead (Pharmacy Tech) on 02/28/19 at 2135  Med List Status: Complete   Medication Last Dose Status   amitriptyline (ELAVIL) 150 MG Tab 2/27/2019 Active   Ascorbic Acid (VITAMIN C) 1000 MG Tab 2/28/2019 Active   gabapentin (NEURONTIN) 300 MG Cap 2/28/2019 Active   levothyroxine (SYNTHROID) 150 MCG Tab " "2/28/2019 Active   lisinopril-hydrochlorothiazide (PRINZIDE, ZESTORETIC) 20-25 MG per tablet 2/28/2019 Active   metformin (GLUCOPHAGE) 1000 MG tablet 2/28/2019 Active   metoprolol SR (TOPROL XL) 25 MG TABLET SR 24 HR 2/28/2019 Active   pantoprazole (PROTONIX) 40 MG Tablet Delayed Response 2/28/2019 Active   simvastatin (ZOCOR) 40 MG Tab 2/27/2019 Active   SUPER B COMPLEX/C PO 2/28/2019 Active   tizanidine (ZANAFLEX) 2 MG capsule UNK Active                FAMILY HISTORY: No family history on file.     SOCIAL HISTORY:   Social History     Social History Main Topics   • Smoking status: Current Every Day Smoker     Packs/day: 1.00     Years: 47.00     Types: Cigarettes   • Smokeless tobacco: Never Used   • Alcohol use No   • Drug use: No   • Sexual activity: Not on file       REVIEW OF SYSTEMS: Comprehensive review of systems was negative aside from the above HPI.     PHYSICAL EXAMINATION:     GENERAL: The patient is in moderate distress.   VITAL SIGNS: Blood pressure (!) 91/57, pulse 62, temperature 36.4 °C (97.5 °F), temperature source Temporal, resp. rate 16, height 1.727 m (5' 8\"), weight 113 kg (249 lb 1.9 oz), SpO2 92 %, not currently breastfeeding.  HEAD AND NECK: Demonstrates symmetric, reactive pupils. Extraocular muscles   are intact. Nares and oropharynx are clear.   NECK: Supple. No adenopathy.  CHEST:No respiratory distress.    CARDIOVASCULAR: Regular rate. The extremities are well perfused.   ABDOMEN: Distended abdomen.  Scarring consistent with her surgical history.  She does have a [place] incision as well as a lower midline incision.  She is distended and tender.   EXTREMITIES: Examination of the upper and lower extremities demonstrates no cyanosis edema or clubbing.  NEUROLOGIC: Alert & oriented x 3, Normal motor function, Normal sensory function, No focal deficits noted.    LABORATORY VALUES:   Recent Labs      02/28/19   1822   WBC  18.6*   RBC  5.36   HEMOGLOBIN  15.8   HEMATOCRIT  49.3*   MCV  92.0 "   MCH  29.5   MCHC  32.0*   RDW  47.3   PLATELETCT  288   MPV  9.3     Recent Labs      02/28/19 1822   SODIUM  137   POTASSIUM  4.9   CHLORIDE  105   CO2  14*   GLUCOSE  291*   BUN  37*   CREATININE  1.83*   CALCIUM  8.3*     Recent Labs      02/28/19 1822   ASTSGOT  49*   ALTSGPT  24   TBILIRUBIN  0.6   ALKPHOSPHAT  283*   GLOBULIN  3.0   INR  1.45*     Recent Labs      02/28/19 1822   APTT  39.5*   INR  1.45*        IMAGING:   DX-CHEST-LIMITED (1 VIEW)   Final Result      New right IJ line tip overlies the SVC      No radiographic evidence of acute cardiopulmonary process.      DX-CHEST-PORTABLE (1 VIEW)   Final Result      No acute cardiopulmonary abnormality.      CT-CTA COMPLETE THORACOABDOMINAL AORTA   Final Result      1. No evidence of aortic dissection or aneurysm. No high-grade stenosis or occlusion of major pelvic branches vessels.   2. No pulmonary embolus.   3. Moderate to large amount of stool throughout the colon.            CT-ABDOMEN WITH    (Results Pending)       IMPRESSION AND PLAN:     1.  Severe constipation.  2.  Abdominal pain  3.  Urinary tract infection  4.  Hypothyroidism and hypertension      1.  I offered the patient 3 options.  I offered diagnostic laparoscopy to assure there was no abdominal catastrophe missed on her initial CT scan.  I offered repeat CT scan with water-soluble contrast to assess the GI tract and help treat her constipation.  I offered continued treatment with antibiotics and observation in the ICU.    2.  The patient is elected to repeat her CT scan with water-soluble contrast.  We will place an NG tube to make administration of the contrast easier and help alleviate her distention.    3.  Plan pending the results of the CAT scan.          ___________________________________   Kobe Gama M.D.    DD: 2/28/2019 DT: 11:31 PM

## 2019-03-01 NOTE — ED NOTES
Med Rec Updated and Complete per Pt at bedside  Allergies Reviewed  No PO ABX last 30 days    Pt appears to know medication HX well.

## 2019-03-01 NOTE — PROGRESS NOTES
Updated Dr. Patiño regarding lactic acid results 3.9, continuing to trend up. Orders received and placed into Epic for CVP monitoring and ABG.

## 2019-03-01 NOTE — PROGRESS NOTES
Hospital Medicine Daily Progress Note    Date of Service  3/1/2019    Chief Complaint  Abdominal pain    Hospital Course    63 y.o. obese female with hypertension, hypothyroid, diabetes, depression admitted 2/28/2019 with sepsis due to UTI.  She initially presented with complaints of nausea, vomiting and concerns of multiple syncopal episodes on day of admission.  Her blood pressure was 50 upon emergency medical staff response at her home.  In the emergency room she was hypotensive with systolics in the 70s.  CT scan was performed in the emergency room with concerns of constipation but negative for any aneurysm or dissection on CTA.      Interval Problem Update  Remains with acute renal insufficiency  Potassium remains elevated but slight drop from yesterday.  3/1 CT showing mild abdominopelvic ascites.  Six L fluids since admit  Incrase confusion overnight but can reoriented per RN  Restless and attempts to pull out lines  Sinus tach  On levophed  On 2L NC  Had 8 BM's overnight  NPO for now. Advance to clears   abdomen  Tmax:100.3  Vasopressin added this am.    Consultants/Specialty  Pulmonary/Intensivist    Code Status  FULL    Disposition  Monitor in ICU    Review of Systems  Review of Systems   Unable to perform ROS: Mental acuity   Constitutional: Negative for fever.   HENT: Negative for congestion and sore throat.    Respiratory: Negative for cough and shortness of breath.    Cardiovascular: Negative for chest pain, palpitations and leg swelling.   Gastrointestinal: Positive for abdominal pain. Negative for nausea.   Genitourinary: Negative for dysuria.   Musculoskeletal: Negative for back pain.   Neurological: Negative for dizziness.   Psychiatric/Behavioral: The patient is not nervous/anxious.         Physical Exam  Temp:  [36.4 °C (97.5 °F)-37.9 °C (100.3 °F)] 36.9 °C (98.5 °F)  Pulse:  [40-98] 96  Resp:  [15-70] 24  BP: (58-91)/(26-57) 91/57  SpO2:  [79 %-100 %] 93 %    Physical Exam    Constitutional: She appears ill. No distress.   Obese   HENT:   Head: Normocephalic and atraumatic.   Nose: Nose normal.   Mouth/Throat: No oropharyngeal exudate.   Eyes: Conjunctivae and EOM are normal. Right eye exhibits no discharge. Left eye exhibits no discharge. No scleral icterus.   Neck: No tracheal deviation present.   Cardiovascular: Normal rate, regular rhythm and normal heart sounds.    No murmur heard.  Pulses:       Radial pulses are 2+ on the right side, and 2+ on the left side.        Dorsalis pedis pulses are 2+ on the right side, and 2+ on the left side.   Pulmonary/Chest: Effort normal and breath sounds normal. No stridor. No respiratory distress. She has no wheezes.   Abdominal: Soft. Bowel sounds are normal. She exhibits no distension. There is tenderness.   Musculoskeletal: She exhibits no edema.   Neurological: She is alert. She is disoriented. No cranial nerve deficit.   Skin: Skin is warm. She is not diaphoretic.   Psychiatric: She has a normal mood and affect. Her speech is normal and behavior is normal. Cognition and memory are impaired.   Vitals reviewed.      Fluids    Intake/Output Summary (Last 24 hours) at 03/01/19 1547  Last data filed at 03/01/19 1200   Gross per 24 hour   Intake          4373.14 ml   Output             1185 ml   Net          3188.14 ml       Laboratory  Recent Labs      02/28/19   1822  02/28/19   2330  03/01/19   0400   WBC  18.6*  13.9*  16.8*   RBC  5.36  4.77  4.87   HEMOGLOBIN  15.8  13.9  14.2   HEMATOCRIT  49.3*  44.7  44.7   MCV  92.0  93.7  91.8   MCH  29.5  29.1  29.2   MCHC  32.0*  31.1*  31.8*   RDW  47.3  47.8  47.8   PLATELETCT  288  267  248   MPV  9.3  9.5  9.3     Recent Labs      02/28/19   1822  02/28/19   2330  03/01/19   0400   SODIUM  137  138  139   POTASSIUM  4.9  6.0*  5.7*   CHLORIDE  105  111  114*   CO2  14*  11*  13*   GLUCOSE  291*  314*  174*   BUN  37*  40*  43*   CREATININE  1.83*  1.76*  1.83*   CALCIUM  8.3*  6.8*  7.5*      Recent Labs      02/28/19   1822  02/28/19   2330   APTT  39.5*   --    INR  1.45*  1.62*     Recent Labs      02/28/19   1822   BNPBTYPENAT  13           Imaging  CT-HEAD W/O   Final Result   Addendum 1 of 1   Call report initiated at 1:40 PM on 3/1/2019.      These findings were discussed with LARRY WISEMAN on 3/1/2019 2:24 PM.      Final      1.  Subtle increased density of LEFT middle cerebral artery and sylvian branches possibly indicating slow flow or occlusion and potential early sign of acute stroke.   2.  No intracranial hemorrhage.   3.  Mild atrophy.      CT-ABDOMEN-PELVIS W/O   Final Result      Decrease in volume of colonic stool with no abnormal bowel dilatation detected      New third spacing with some new mild abdominopelvic ascites. No free air is seen to indicate extravasation. No loculated fluid collection to suggest abscess is noted      No pneumatosis detected to suggest bowel ischemia         DX-ABDOMEN FOR TUBE PLACEMENT   Final Result      Enteric tube terminates over the gastroduodenal junction.      DX-CHEST-LIMITED (1 VIEW)   Final Result      New right IJ line tip overlies the SVC      No radiographic evidence of acute cardiopulmonary process.      DX-CHEST-PORTABLE (1 VIEW)   Final Result      No acute cardiopulmonary abnormality.      CT-CTA COMPLETE THORACOABDOMINAL AORTA   Final Result      1. No evidence of aortic dissection or aneurysm. No high-grade stenosis or occlusion of major pelvic branches vessels.   2. No pulmonary embolus.   3. Moderate to large amount of stool throughout the colon.                 Assessment/Plan  * Septic shock (HCC)   Assessment & Plan    This is severe sepsis with the following associated acute organ dysfunction(s): acute kidney failure.   IV fluids  Monitor CVP  Check cortisol level and TSH  Strict I's and O's  Pressor support to keep map greater than 65 and systolic blood pressure greater than 90  Sepsis protocol  Antibiotics  Suspect UTI  CT Abd  w/o clear source  Serial lactic acid until declining  Monitor labs, vitals     Acute encephalopathy   Assessment & Plan    Check TSH  Likely sepsis from UTI treat with abx  Ordered stat head CT with abnormal findings.  Unfortunately unable to perform CTA head and neck secondary to renal function.  MRA with gadolinium would also be not recommended.  I have discussed with Dr. Philip Patiño is initiating high intensity statin and an aspirin.  Monitor neurochecks     Lactic acidosis   Assessment & Plan    Remains elevated.  Continue IV fluids     JOSE (acute kidney injury) ([facility])   Assessment & Plan    Concern of prerenal state.  Giving IV fluids  Monitor I's and O's  3/1/19 BUN: 43 creatinine: 1.83  Avoid nephrotoxins       Urinary tract infection   Assessment & Plan    Follow urine culture  Monitor labs  IV antibiotics  IV fluids  Monitor labs, vitals,   3/1 WBC:16.8  2/28 WBC:13.9     Hypotension   Assessment & Plan    Pressor support to keep MAP >65 and SBP >90  Monitor I/O's, vitals, labs     Abnormal LFTs   Assessment & Plan    Obese concern of hepatic congestion from possible right-sided heart failure.  Monitor CMP  3/1/19 CT abdomen/pelvis:   Decrease in volume of colonic stool with no abnormal bowel dilatation detected   New third spacing with some new mild abdominopelvic ascites. No free air is seen to indicate extravasation. No loculated fluid collection to suggest  abscess is noted   No pneumatosis detected to suggest bowel ischemia  2/28/19 CT aortogram without acute dissection or aneurysm     DM (diabetes mellitus) ([facility])   Assessment & Plan    Monitor Accu-Cheks covering sliding scale insulin  Outpatient she was on metformin 1000 mg twice daily.  Metformin currently being held  Check hemoglobin A1c     Depression   Assessment & Plan    Continue Elavil and Prozac     Class 2 severe obesity due to excess calories with serious comorbidity and body mass index (BMI) of 37.0 to 37.9 in adult ([facility])    Assessment & Plan    Monitor BMI  Will need further encouragement and education on lifestyle modifications and dietary changes     CN (constipation)   Assessment & Plan    IV fluids  Bowel protocol  Required a BMS system 3/1          VTE prophylaxis: heparin

## 2019-03-01 NOTE — PROGRESS NOTES
Pt taken down on hospital bed to CT scan. Pt tolerated transport well. VSS throughout. C/O pain when moved on and off hospital bed.

## 2019-03-01 NOTE — ASSESSMENT & PLAN NOTE
Severe sepsis with associated acute encephalopathy and acute kidney injury  The source of sepsis is not entirely clear  Blood cultures are negative  UA is improved  Sputum culture negative  Shock has resolved - no longer on vasopressor support  Sepsis improved  Zosyn stopped yesterday - observe off antibiotics

## 2019-03-01 NOTE — ED NOTES
Pt remains hypotensive. Pt A&Ox4. Inserted hernandez cath with minimal urine output. erp at bedside.

## 2019-03-01 NOTE — PROGRESS NOTES
Lab called with critical result of Calcium 6.8 at 0050. Critical lab result read back to Leonides Maravilla notified of critical lab result at 0115.  Critical lab result read back by Dr. Maravilla.

## 2019-03-01 NOTE — ASSESSMENT & PLAN NOTE
Resolved.  This was severe sepsis with the following associated acute organ dysfunction(s): acute kidney failure. Urinary tract infection source  - S/p abx, ended 3/9  - continue with PT/OT, SNF when bed available

## 2019-03-01 NOTE — PROGRESS NOTES
Surgery  Pt with worsening lactate  Amylase and lipase elevated  Renal failure  Spoke with radiologist regarding prior CT, confirmed no obvious intra abdominal pathology.  Will repeat CT scan with water soluble contrast.   I do not want to perform an unnecessary operation in an ill patient, nor do I want to miss fixable pathology.  Recommend broad spectrum antibiotics and plan pending CT scan.

## 2019-03-01 NOTE — THERAPY
OT referral received. Per RN pt being worked up for possible CVA. Presented today with confusion, vision changes. RN request hold OT eval until stroke work-up completed. Will continue to monitor and complete OT eval as indicated.

## 2019-03-02 ENCOUNTER — APPOINTMENT (OUTPATIENT)
Dept: CARDIOLOGY | Facility: MEDICAL CENTER | Age: 64
DRG: 871 | End: 2019-03-02
Attending: HOSPITALIST
Payer: MEDICARE

## 2019-03-02 ENCOUNTER — APPOINTMENT (OUTPATIENT)
Dept: RADIOLOGY | Facility: MEDICAL CENTER | Age: 64
DRG: 871 | End: 2019-03-02
Attending: HOSPITALIST
Payer: MEDICARE

## 2019-03-02 LAB
ALBUMIN SERPL BCP-MCNC: 2.8 G/DL (ref 3.2–4.9)
ALBUMIN/GLOB SERPL: 1.1 G/DL
ALP SERPL-CCNC: 181 U/L (ref 30–99)
ALT SERPL-CCNC: 16 U/L (ref 2–50)
ANION GAP SERPL CALC-SCNC: 10 MMOL/L (ref 0–11.9)
ANION GAP SERPL CALC-SCNC: 7 MMOL/L (ref 0–11.9)
AST SERPL-CCNC: 27 U/L (ref 12–45)
BASOPHILS # BLD AUTO: 0.9 % (ref 0–1.8)
BASOPHILS # BLD: 0.11 K/UL (ref 0–0.12)
BILIRUB SERPL-MCNC: 0.5 MG/DL (ref 0.1–1.5)
BUN SERPL-MCNC: 37 MG/DL (ref 8–22)
BUN SERPL-MCNC: 43 MG/DL (ref 8–22)
BURR CELLS BLD QL SMEAR: NORMAL
CALCIUM SERPL-MCNC: 7 MG/DL (ref 8.5–10.5)
CALCIUM SERPL-MCNC: 7.1 MG/DL (ref 8.5–10.5)
CHLORIDE SERPL-SCNC: 106 MMOL/L (ref 96–112)
CHLORIDE SERPL-SCNC: 111 MMOL/L (ref 96–112)
CO2 SERPL-SCNC: 16 MMOL/L (ref 20–33)
CO2 SERPL-SCNC: 20 MMOL/L (ref 20–33)
CREAT SERPL-MCNC: 1.29 MG/DL (ref 0.5–1.4)
CREAT SERPL-MCNC: 1.46 MG/DL (ref 0.5–1.4)
DOHLE BOD BLD QL SMEAR: NORMAL
EOSINOPHIL # BLD AUTO: 0 K/UL (ref 0–0.51)
EOSINOPHIL NFR BLD: 0 % (ref 0–6.9)
ERYTHROCYTE [DISTWIDTH] IN BLOOD BY AUTOMATED COUNT: 46.9 FL (ref 35.9–50)
GLOBULIN SER CALC-MCNC: 2.5 G/DL (ref 1.9–3.5)
GLUCOSE BLD-MCNC: 154 MG/DL (ref 65–99)
GLUCOSE BLD-MCNC: 176 MG/DL (ref 65–99)
GLUCOSE BLD-MCNC: 212 MG/DL (ref 65–99)
GLUCOSE BLD-MCNC: 230 MG/DL (ref 65–99)
GLUCOSE SERPL-MCNC: 210 MG/DL (ref 65–99)
GLUCOSE SERPL-MCNC: 217 MG/DL (ref 65–99)
HCT VFR BLD AUTO: 36 % (ref 37–47)
HGB BLD-MCNC: 11.5 G/DL (ref 12–16)
LACTATE BLD-SCNC: 1.6 MMOL/L (ref 0.5–2)
LACTATE BLD-SCNC: 2.1 MMOL/L (ref 0.5–2)
LACTATE BLD-SCNC: 2.6 MMOL/L (ref 0.5–2)
LACTATE BLD-SCNC: 2.6 MMOL/L (ref 0.5–2)
LIPASE SERPL-CCNC: 19 U/L (ref 11–82)
LV EJECT FRACT  99904: 70
LYMPHOCYTES # BLD AUTO: 1.17 K/UL (ref 1–4.8)
LYMPHOCYTES NFR BLD: 9.6 % (ref 22–41)
MANUAL DIFF BLD: ABNORMAL
MCH RBC QN AUTO: 28.5 PG (ref 27–33)
MCHC RBC AUTO-ENTMCNC: 31.9 G/DL (ref 33.6–35)
MCV RBC AUTO: 89.3 FL (ref 81.4–97.8)
MONOCYTES # BLD AUTO: 0.52 K/UL (ref 0–0.85)
MONOCYTES NFR BLD AUTO: 4.3 % (ref 0–13.4)
MORPHOLOGY BLD-IMP: NORMAL
NEUTROPHILS # BLD AUTO: 10.39 K/UL (ref 2–7.15)
NEUTROPHILS NFR BLD: 61.7 % (ref 44–72)
NEUTS BAND NFR BLD MANUAL: 23.5 % (ref 0–10)
NRBC # BLD AUTO: 0 K/UL
NRBC BLD-RTO: 0 /100 WBC
PLATELET # BLD AUTO: 174 K/UL (ref 164–446)
PLATELET BLD QL SMEAR: NORMAL
PMV BLD AUTO: 9.6 FL (ref 9–12.9)
POIKILOCYTOSIS BLD QL SMEAR: NORMAL
POTASSIUM SERPL-SCNC: 3.6 MMOL/L (ref 3.6–5.5)
POTASSIUM SERPL-SCNC: 3.7 MMOL/L (ref 3.6–5.5)
PROT SERPL-MCNC: 5.3 G/DL (ref 6–8.2)
RBC # BLD AUTO: 4.03 M/UL (ref 4.2–5.4)
RBC BLD AUTO: PRESENT
SODIUM SERPL-SCNC: 134 MMOL/L (ref 135–145)
SODIUM SERPL-SCNC: 136 MMOL/L (ref 135–145)
WBC # BLD AUTO: 12.2 K/UL (ref 4.8–10.8)

## 2019-03-02 PROCEDURE — 93306 TTE W/DOPPLER COMPLETE: CPT

## 2019-03-02 PROCEDURE — 700117 HCHG RX CONTRAST REV CODE 255: Performed by: HOSPITALIST

## 2019-03-02 PROCEDURE — 770022 HCHG ROOM/CARE - ICU (200)

## 2019-03-02 PROCEDURE — 700101 HCHG RX REV CODE 250: Performed by: INTERNAL MEDICINE

## 2019-03-02 PROCEDURE — A9270 NON-COVERED ITEM OR SERVICE: HCPCS | Performed by: INTERNAL MEDICINE

## 2019-03-02 PROCEDURE — 93306 TTE W/DOPPLER COMPLETE: CPT | Mod: 26 | Performed by: INTERNAL MEDICINE

## 2019-03-02 PROCEDURE — 85007 BL SMEAR W/DIFF WBC COUNT: CPT

## 2019-03-02 PROCEDURE — 82962 GLUCOSE BLOOD TEST: CPT | Mod: 91

## 2019-03-02 PROCEDURE — 83605 ASSAY OF LACTIC ACID: CPT | Mod: 91

## 2019-03-02 PROCEDURE — 700111 HCHG RX REV CODE 636 W/ 250 OVERRIDE (IP): Performed by: HOSPITALIST

## 2019-03-02 PROCEDURE — 700102 HCHG RX REV CODE 250 W/ 637 OVERRIDE(OP): Performed by: HOSPITALIST

## 2019-03-02 PROCEDURE — 80048 BASIC METABOLIC PNL TOTAL CA: CPT

## 2019-03-02 PROCEDURE — 700111 HCHG RX REV CODE 636 W/ 250 OVERRIDE (IP): Performed by: INTERNAL MEDICINE

## 2019-03-02 PROCEDURE — A9270 NON-COVERED ITEM OR SERVICE: HCPCS | Performed by: HOSPITALIST

## 2019-03-02 PROCEDURE — 99291 CRITICAL CARE FIRST HOUR: CPT | Performed by: INTERNAL MEDICINE

## 2019-03-02 PROCEDURE — 83690 ASSAY OF LIPASE: CPT

## 2019-03-02 PROCEDURE — 85027 COMPLETE CBC AUTOMATED: CPT

## 2019-03-02 PROCEDURE — 99292 CRITICAL CARE ADDL 30 MIN: CPT | Performed by: INTERNAL MEDICINE

## 2019-03-02 PROCEDURE — 700105 HCHG RX REV CODE 258: Performed by: INTERNAL MEDICINE

## 2019-03-02 PROCEDURE — 700102 HCHG RX REV CODE 250 W/ 637 OVERRIDE(OP): Performed by: INTERNAL MEDICINE

## 2019-03-02 PROCEDURE — 99233 SBSQ HOSP IP/OBS HIGH 50: CPT | Performed by: HOSPITALIST

## 2019-03-02 PROCEDURE — 80053 COMPREHEN METABOLIC PANEL: CPT

## 2019-03-02 RX ORDER — POTASSIUM CHLORIDE 14.9 MG/ML
20 INJECTION INTRAVENOUS ONCE
Status: COMPLETED | OUTPATIENT
Start: 2019-03-02 | End: 2019-03-02

## 2019-03-02 RX ORDER — POTASSIUM CHLORIDE 20 MEQ/1
40 TABLET, EXTENDED RELEASE ORAL ONCE
Status: DISCONTINUED | OUTPATIENT
Start: 2019-03-02 | End: 2019-03-02

## 2019-03-02 RX ADMIN — ACETAMINOPHEN 650 MG: 325 TABLET, FILM COATED ORAL at 19:52

## 2019-03-02 RX ADMIN — SODIUM ACETATE: 3.28 INJECTION, SOLUTION, CONCENTRATE INTRAVENOUS at 18:44

## 2019-03-02 RX ADMIN — NOREPINEPHRINE BITARTRATE 6 MCG/MIN: 1 INJECTION INTRAVENOUS at 04:10

## 2019-03-02 RX ADMIN — MORPHINE SULFATE 5 MG: 10 INJECTION INTRAVENOUS at 19:41

## 2019-03-02 RX ADMIN — FLUOXETINE HYDROCHLORIDE 40 MG: 20 CAPSULE ORAL at 05:56

## 2019-03-02 RX ADMIN — GABAPENTIN 600 MG: 300 CAPSULE ORAL at 12:38

## 2019-03-02 RX ADMIN — ATORVASTATIN CALCIUM 40 MG: 40 TABLET, FILM COATED ORAL at 17:59

## 2019-03-02 RX ADMIN — MORPHINE SULFATE 2 MG: 10 INJECTION INTRAVENOUS at 06:11

## 2019-03-02 RX ADMIN — HEPARIN SODIUM 5000 UNITS: 5000 INJECTION, SOLUTION INTRAVENOUS; SUBCUTANEOUS at 21:04

## 2019-03-02 RX ADMIN — MORPHINE SULFATE 5 MG: 10 INJECTION INTRAVENOUS at 22:03

## 2019-03-02 RX ADMIN — HEPARIN SODIUM 5000 UNITS: 5000 INJECTION, SOLUTION INTRAVENOUS; SUBCUTANEOUS at 14:03

## 2019-03-02 RX ADMIN — VASOPRESSIN 0.03 UNITS/MIN: 20 INJECTION INTRAVENOUS at 04:10

## 2019-03-02 RX ADMIN — POTASSIUM CHLORIDE 20 MEQ: 14.9 INJECTION, SOLUTION INTRAVENOUS at 14:07

## 2019-03-02 RX ADMIN — GABAPENTIN 600 MG: 300 CAPSULE ORAL at 17:59

## 2019-03-02 RX ADMIN — INSULIN HUMAN 1 UNITS: 100 INJECTION, SOLUTION PARENTERAL at 12:36

## 2019-03-02 RX ADMIN — HEPARIN SODIUM 5000 UNITS: 5000 INJECTION, SOLUTION INTRAVENOUS; SUBCUTANEOUS at 05:55

## 2019-03-02 RX ADMIN — SODIUM ACETATE: 3.28 INJECTION, SOLUTION, CONCENTRATE INTRAVENOUS at 05:37

## 2019-03-02 RX ADMIN — INSULIN HUMAN 1 UNITS: 100 INJECTION, SOLUTION PARENTERAL at 17:56

## 2019-03-02 RX ADMIN — MORPHINE SULFATE 2 MG: 10 INJECTION INTRAVENOUS at 15:11

## 2019-03-02 RX ADMIN — ASPIRIN 325 MG: 325 TABLET ORAL at 05:56

## 2019-03-02 RX ADMIN — VASOPRESSIN 0.03 UNITS/MIN: 20 INJECTION INTRAVENOUS at 21:01

## 2019-03-02 RX ADMIN — PIPERACILLIN SODIUM AND TAZOBACTAM SODIUM 3.38 G: 3; .375 INJECTION, POWDER, FOR SOLUTION INTRAVENOUS at 14:03

## 2019-03-02 RX ADMIN — ACETAMINOPHEN 650 MG: 325 TABLET, FILM COATED ORAL at 15:05

## 2019-03-02 RX ADMIN — PIPERACILLIN SODIUM AND TAZOBACTAM SODIUM 3.38 G: 3; .375 INJECTION, POWDER, FOR SOLUTION INTRAVENOUS at 05:34

## 2019-03-02 RX ADMIN — MORPHINE SULFATE 3 MG: 10 INJECTION INTRAVENOUS at 16:06

## 2019-03-02 RX ADMIN — LEVOTHYROXINE SODIUM 150 MCG: 150 TABLET ORAL at 05:56

## 2019-03-02 RX ADMIN — PIPERACILLIN SODIUM AND TAZOBACTAM SODIUM 3.38 G: 3; .375 INJECTION, POWDER, FOR SOLUTION INTRAVENOUS at 21:03

## 2019-03-02 RX ADMIN — MORPHINE SULFATE 2 MG: 10 INJECTION INTRAVENOUS at 03:21

## 2019-03-02 RX ADMIN — INSULIN HUMAN 2 UNITS: 100 INJECTION, SOLUTION PARENTERAL at 00:14

## 2019-03-02 RX ADMIN — INSULIN HUMAN 2 UNITS: 100 INJECTION, SOLUTION PARENTERAL at 06:08

## 2019-03-02 RX ADMIN — GABAPENTIN 600 MG: 300 CAPSULE ORAL at 05:55

## 2019-03-02 RX ADMIN — AMITRIPTYLINE HYDROCHLORIDE 300 MG: 100 TABLET, FILM COATED ORAL at 21:03

## 2019-03-02 RX ADMIN — HUMAN ALBUMIN MICROSPHERES AND PERFLUTREN 3 ML: 10; .22 INJECTION, SOLUTION INTRAVENOUS at 15:14

## 2019-03-02 NOTE — PROGRESS NOTES
Dr. Shipley at bedside updating family on results and plan of care. Questions answered and emotional support offered.

## 2019-03-02 NOTE — PROGRESS NOTES
[facility] Medicine Daily Progress Note    Date of Service  3/2/2019    Chief Complaint  Abdominal pain    Hospital Course    63 y.o. obese female with hypertension, hypothyroid, diabetes, depression admitted 2/28/2019 with sepsis due to UTI.  She initially presented with complaints of nausea, vomiting and concerns of multiple syncopal episodes on day of admission.  Her blood pressure was 50 upon emergency medical staff response at her home.  In the emergency room she was hypotensive with systolics in the 70s.  CT scan was performed in the emergency room with concerns of constipation but negative for any aneurysm or dissection on CTA.      Interval Problem Update  Oriented to self only  Some word finding difficulty  Moves all extremities  Remains on pressor support  Increase in O2 needs  NPO due to distended abdomen today      Consultants/Specialty  Pulmonary/Intensivist    Code Status  FULL    Disposition  Monitor in ICU    Review of Systems  Review of Systems   Unable to perform ROS: Mental acuity        Physical Exam  Temp:  [36.2 °C (97.2 °F)-39.6 °C (103.2 °F)] 39.6 °C (103.2 °F)  Pulse:  [] 120  Resp:  [12-36] 21  SpO2:  [88 %-96 %] 92 %    Physical Exam   Constitutional: She appears lethargic. She appears ill. No distress.   Obese   HENT:   Head: Normocephalic and atraumatic.   Nose: Nose normal.   Mouth/Throat: No oropharyngeal exudate.   Eyes: Conjunctivae and EOM are normal. Right eye exhibits no discharge. Left eye exhibits no discharge. No scleral icterus.   Neck: No tracheal deviation present.   Cardiovascular: Normal rate, regular rhythm and normal heart sounds.    No murmur heard.  Pulses:       Radial pulses are 2+ on the right side, and 2+ on the left side.        Dorsalis pedis pulses are 2+ on the right side, and 2+ on the left side.   Pulmonary/Chest: Effort normal and breath sounds normal. No respiratory distress. She has no wheezes. She has no rales.   Abdominal: Soft. Bowel sounds are  normal. She exhibits no distension. There is tenderness (bilateral flanks).   Musculoskeletal: She exhibits no edema.   Lymphadenopathy:     She has no cervical adenopathy.   Neurological: She appears lethargic. She is disoriented. No cranial nerve deficit.   Skin: Skin is warm. She is not diaphoretic.   Psychiatric: She has a normal mood and affect. Her speech is normal and behavior is normal. Cognition and memory are impaired.   Vitals reviewed.      Fluids    Intake/Output Summary (Last 24 hours) at 03/02/19 2040  Last data filed at 03/02/19 2000   Gross per 24 hour   Intake           3792.2 ml   Output             3785 ml   Net              7.2 ml       Laboratory  Recent Labs      02/28/19   2330  03/01/19   0400  03/02/19   0328   WBC  13.9*  16.8*  12.2*   RBC  4.77  4.87  4.03*   HEMOGLOBIN  13.9  14.2  11.5*   HEMATOCRIT  44.7  44.7  36.0*   MCV  93.7  91.8  89.3   MCH  29.1  29.2  28.5   MCHC  31.1*  31.8*  31.9*   RDW  47.8  47.8  46.9   PLATELETCT  267  248  174   MPV  9.5  9.3  9.6     Recent Labs      03/01/19   1432  03/02/19   0328  03/02/19   1520   SODIUM  138  134*  136   POTASSIUM  5.1  3.7  3.6   CHLORIDE  114*  111  106   CO2  12*  16*  20   GLUCOSE  225*  217*  210*   BUN  46*  43*  37*   CREATININE  1.74*  1.46*  1.29   CALCIUM  7.3*  7.0*  7.1*     Recent Labs      02/28/19   1822  02/28/19   2330   APTT  39.5*   --    INR  1.45*  1.62*     Recent Labs      02/28/19   1822   BNPBTYPENAT  13           Imaging  EC-ECHOCARDIOGRAM COMPLETE W/ CONT   Final Result      CT-HEAD W/O   Final Result   Addendum 1 of 1   Call report initiated at 1:40 PM on 3/1/2019.      These findings were discussed with LARRY WISEMAN on 3/1/2019 2:24 PM.      Final      1.  Subtle increased density of LEFT middle cerebral artery and sylvian branches possibly indicating slow flow or occlusion and potential early sign of acute stroke.   2.  No intracranial hemorrhage.   3.  Mild atrophy.      CT-ABDOMEN-PELVIS W/O    Final Result      Decrease in volume of colonic stool with no abnormal bowel dilatation detected      New third spacing with some new mild abdominopelvic ascites. No free air is seen to indicate extravasation. No loculated fluid collection to suggest abscess is noted      No pneumatosis detected to suggest bowel ischemia         DX-ABDOMEN FOR TUBE PLACEMENT   Final Result      Enteric tube terminates over the gastroduodenal junction.      DX-CHEST-LIMITED (1 VIEW)   Final Result      New right IJ line tip overlies the SVC      No radiographic evidence of acute cardiopulmonary process.      DX-CHEST-PORTABLE (1 VIEW)   Final Result      No acute cardiopulmonary abnormality.      CT-CTA COMPLETE THORACOABDOMINAL AORTA   Final Result      1. No evidence of aortic dissection or aneurysm. No high-grade stenosis or occlusion of major pelvic branches vessels.   2. No pulmonary embolus.   3. Moderate to large amount of stool throughout the colon.            MR-BRAIN-W/O    (Results Pending)        Assessment/Plan  * Septic shock (HCC)   Assessment & Plan    This is severe sepsis with the following associated acute organ dysfunction(s): acute kidney failure.   IV fluids  Monitor CVP  3/1 cortisol level:37 and TSH: 1.1  Strict I's and O's  Pressor support to keep map greater than 65 and systolic blood pressure greater than 90  Sepsis protocol  Antibiotics  Suspect UTI/pyelonephritis.    Urine culture growing lactose fermenting GNR  CT Abd w/o clear source  Monitor labs, vitals     Acute encephalopathy   Assessment & Plan    normalTSH  Likely sepsis from UTI treat with abx  Abnormal CT Head.  Unfortunately unable to perform CTA head and neck secondary to renal function.  MRA with gadolinium would also be not recommended.  I have discussed with Dr. Philip Patiño is initiating high intensity statin and an aspirin.  Monitor neurochecks  Near future MRI Brain once able to hold still     Lactic acidosis   Assessment &  Plan    Improved CO2  Continue IV fluids     JOSE (acute kidney injury) ([facility])   Assessment & Plan    Concern of prerenal state.  Giving IV fluids  Monitor I's and O's  3/2 BUN:37, Cr:1.29  3/1/19 BUN: 43 creatinine: 1.83  Avoid nephrotoxins       Urinary tract infection   Assessment & Plan    Follow urine culture (as of 3/2 growing LF GNR)  Monitor labs  IV antibiotics  IV fluids  Monitor labs, vitals,   3/2 WBC:12.2  3/1 WBC:16.8  2/28 WBC:13.9     Hypotension   Assessment & Plan    Pressor support to keep MAP >65 and SBP >90  Monitor I/O's, vitals, labs     Abnormal LFTs   Assessment & Plan    Obese concern of hepatic congestion from possible right-sided heart failure.  Monitor CMP  3/1/19 CT abdomen/pelvis:   Decrease in volume of colonic stool with no abnormal bowel dilatation detected   New third spacing with some new mild abdominopelvic ascites. No free air is seen to indicate extravasation. No loculated fluid collection to suggest  abscess is noted   No pneumatosis detected to suggest bowel ischemia  2/28/19 CT aortogram without acute dissection or aneurysm     DM (diabetes mellitus) ([facility])   Assessment & Plan    Monitor Accu-Cheks covering sliding scale insulin  Outpatient she was on metformin 1000 mg twice daily.  Metformin currently being held  Check hemoglobin A1c     Depression   Assessment & Plan    Continue Elavil and Prozac     Class 2 severe obesity due to excess calories with serious comorbidity and body mass index (BMI) of 37.0 to 37.9 in adult ([facility])   Assessment & Plan    Monitor BMI  Will need further encouragement and education on lifestyle modifications and dietary changes     CN (constipation)   Assessment & Plan    IV fluids  Bowel protocol  Required a BMS system 3/1          VTE prophylaxis: heparin

## 2019-03-02 NOTE — CARE PLAN
Problem: Communication  Goal: The ability to communicate needs accurately and effectively will improve  Outcome: PROGRESSING SLOWER THAN EXPECTED      Problem: Safety  Goal: Will remain free from injury  Outcome: PROGRESSING AS EXPECTED    Goal: Will remain free from falls  Outcome: PROGRESSING AS EXPECTED      Problem: Venous Thromboembolism (VTW)/Deep Vein Thrombosis (DVT) Prevention:  Goal: Patient will participate in Venous Thrombosis (VTE)/Deep Vein Thrombosis (DVT)Prevention Measures  Outcome: PROGRESSING AS EXPECTED      Problem: Bowel/Gastric:  Goal: Normal bowel function is maintained or improved  Outcome: PROGRESSING SLOWER THAN EXPECTED

## 2019-03-02 NOTE — PROGRESS NOTES
Critical Care Progress Note    Date of admission  2/28/2019    Chief Complaint  63 y.o. female admitted 2/28/2019 with weakness and abdo pain    Hospital Course    63 y.o. female who presented 2/28/2019 with several days of constipation but feeling much weaker on day of hospital admission. Feels that stomach is bloated. Denies focal abdomina pain, vomiting, dysuria, chest pain, SOB, fever, sweats, chills, illness among others at home. In ED noted to SBPs in the 60s and given 4.5 liters of NS. A central line was placed and pt started on Levophed for maintain a MAP >=65. Cultures of blood and urine taken and given  Empiric ceftriaxone for pesumed UTI. CT of chest abdomen/pelvis done in ED and read by radiology as showing on acute large amount stool but otherwise no impression of PE on CT scan and not acute pthology       Interval Problem Update  Reviewed last 24 hour events:  Tm 100.3  -2.3L over last 24hr  Ct head 3/1 reviewed  Wbc 13->16->12 w 23% bands  K 6->5.7->3.7  Cr 1.7->1.8->1.4  LA 2.9->3.3->2.6    Bcx 2/28 ngtd  Ucx 2/28 LFGNRs    Zosyn 2/28-p    Levophed @ 2   Vaso @ 0.03  Na Acetate @ 100    2L Ncx 91%  Last Bm 3/1    Addendum:  Pt remains with waxing/waning encephalopathy. I had long discussion with family at bedside on options moving forward. Ongoing titration of pressors    Addendum:  Pt moving about quite a bit, will hold off mri for today - result will not change current therapy (high intensity statin/asa in setting of possible ischemic cva) while attempting to get imaging may lead to deterioration. Will hopefully be able to get tomorrow.     Review of Systems  Review of Systems   Unable to perform ROS: Acuity of condition        Vital Signs for last 24 hours   Temp:  [36.4 °C (97.6 °F)-37.9 °C (100.3 °F)] 36.7 °C (98 °F)  Pulse:  [] 122  Resp:  [16-38] 21  SpO2:  [90 %-95 %] 91 %    Hemodynamic parameters for last 24 hours  CVP:  [4 MM HG-7 MM HG] 6 MM HG    Respiratory Information for the  last 24 hours       Physical Exam   Physical Exam   Constitutional: She appears well-developed and well-nourished.   HENT:   Head: Normocephalic and atraumatic.   Eyes: Pupils are equal, round, and reactive to light. No scleral icterus.   Neck: No tracheal deviation present.   Cardiovascular: Normal rate and intact distal pulses.    Pulmonary/Chest: She has no wheezes. She has no rales.   Abdominal: She exhibits distension. There is tenderness. There is rebound. There is no guarding.   Musculoskeletal: She exhibits edema.   Neurological: No cranial nerve deficit.   Skin: Skin is warm and dry.   Psychiatric: She has a normal mood and affect.   Nursing note and vitals reviewed.      Medications  Current Facility-Administered Medications   Medication Dose Route Frequency Provider Last Rate Last Dose   • potassium chloride SA (Kdur) tablet 40 mEq  40 mEq Oral Once Ace Patiño M.D.       • piperacillin-tazobactam (ZOSYN) 3.375 g in  mL IVPB  3.375 g Intravenous Q8HRS Juan Maravilla M.D. 25 mL/hr at 03/02/19 0534 3.375 g at 03/02/19 0534   • vasopressin (VASOSTRICT) 20 Units in  mL Infusion  0.03 Units/min Intravenous Continuous Ace Patiño M.D. 9 mL/hr at 03/02/19 0410 0.03 Units/min at 03/02/19 0410   • acetaminophen (TYLENOL) tablet 650 mg  650 mg Oral Q4HRS PRN MESERET GrangerOAngela   650 mg at 03/01/19 1454   • sodium acetate 150 mEq in sterile water for inj(pf) 1,000 mL infusion   Intravenous Continuous Ace Patiño M.D. 100 mL/hr at 03/02/19 0537     • aspirin (ASA) tablet 325 mg  325 mg Oral DAILY MESERET GrangerO.   325 mg at 03/02/19 0556   • atorvastatin (LIPITOR) tablet 40 mg  40 mg Oral Q EVENING KWAKU Granger.O.   40 mg at 03/01/19 2035   • morphine (pf) 10 mg/mL injection 2-5 mg  2-5 mg Intravenous Q2HRS PRN MESERET GrangerO.   2 mg at 03/02/19 0611   • NS (BOLUS) infusion 500 mL  500 mL Intravenous Once PRN Kareem Solis M.D.       • norepinephrine (LEVOPHED) 8 mg  in  mL Infusion  0-60 mcg/min Intravenous Continuous Juan Maravilla M.D. 3.8 mL/hr at 03/02/19 0636 2 mcg/min at 03/02/19 0636   • senna-docusate (PERICOLACE or SENOKOT S) 8.6-50 MG per tablet 2 Tab  2 Tab Oral BID Kareem Solis M.D.   Stopped at 03/01/19 0600    And   • polyethylene glycol/lytes (MIRALAX) PACKET 1 Packet  1 Packet Oral QDAY PRN Kareem Solis M.D.        And   • magnesium hydroxide (MILK OF MAGNESIA) suspension 30 mL  30 mL Oral QDAY PRN Kareem Solis M.D.        And   • bisacodyl (DULCOLAX) suppository 10 mg  10 mg Rectal QDAY PRSUHA Solis M.D.   10 mg at 02/28/19 2221   • Respiratory Care per Protocol   Nebulization Continuous RT Kareem Solis M.D.       • heparin injection 5,000 Units  5,000 Units Subcutaneous Q8HRS Kareem Solis M.D.   5,000 Units at 03/02/19 0555   • ondansetron (ZOFRAN) syringe/vial injection 4 mg  4 mg Intravenous Q4HRS PRSUHA Solis M.D.   4 mg at 03/01/19 0137   • ondansetron (ZOFRAN ODT) dispertab 4 mg  4 mg Oral Q4HRS PRSUHA Solis M.D.       • promethazine (PHENERGAN) tablet 12.5-25 mg  12.5-25 mg Oral Q4HRS PRN Kareem Solis M.D.       • promethazine (PHENERGAN) suppository 12.5-25 mg  12.5-25 mg Rectal Q4HRS PRSUHA Solis M.D.       • prochlorperazine (COMPAZINE) injection 5-10 mg  5-10 mg Intravenous Q4HRS PRSUHA Solis M.D.       • amitriptyline (ELAVIL) tablet 300 mg  300 mg Oral Nightly Kareem Solis M.D.   300 mg at 03/01/19 2035   • FLUoxetine (PROZAC) capsule 40 mg  40 mg Oral DAILY Kareem Solis M.D.   40 mg at 03/02/19 0556   • gabapentin (NEURONTIN) capsule 600 mg  600 mg Oral TID Kareem Solis M.D.   600 mg at 03/02/19 0555   • levothyroxine (SYNTHROID) tablet 150 mcg  150 mcg Oral AM ES Kareem Solis M.D.   150 mcg at 03/02/19 0556   • tizanidine (ZANAFLEX) tablet 2 mg  2 mg Oral TID PRN Kareem Solis M.D.       • insulin regular (HUMULIN R) injection 1-6 Units   1-6 Units Subcutaneous Q6HRS Kareem Solis M.D.   2 Units at 03/02/19 0608    And   • glucose 4 g chewable tablet 16 g  16 g Oral Q15 MIN PRN Kareem Solis M.D.        And   • dextrose 50% (D50W) injection 25 mL  25 mL Intravenous Q15 MIN PRN Kareem Solis M.D.           Fluids    Intake/Output Summary (Last 24 hours) at 03/02/19 0719  Last data filed at 03/02/19 0553   Gross per 24 hour   Intake          4064.93 ml   Output             6400 ml   Net         -2335.07 ml       Laboratory  Recent Labs      03/01/19   1512   ISTATAPH  7.240*   ISTATAPCO2  21.2*   ISTATAPO2  59*   ISTATATCO2  10*   GCDFRME7SBE  86*   ISTATARTHCO3  9.1*   ISTATARTBE  -16*   ISTATTEMP  98.5 F   ISTATFIO2  21   ISTATSPEC  Arterial   ISTATAPHTC  7.241*   SWYPSDPN2YC  59*     Recent Labs      02/28/19   1822   TROPONINI  <0.01   BNPBTYPENAT  13     Recent Labs      03/01/19   0400  03/01/19   1432  03/02/19   0328   SODIUM  139  138  134*   POTASSIUM  5.7*  5.1  3.7   CHLORIDE  114*  114*  111   CO2  13*  12*  16*   BUN  43*  46*  43*   CREATININE  1.83*  1.74*  1.46*   CALCIUM  7.5*  7.3*  7.0*     Recent Labs      02/28/19   2330  03/01/19   0400  03/01/19   1432  03/02/19   0328   ALTSGPT  24  23   --   16   ASTSGOT  53*  46*   --   27   ALKPHOSPHAT  319*  315*   --   181*   TBILIRUBIN  0.4  0.4   --   0.5   AMYLASE  885*   --    --    --    LIPASE  133*   --    --   19   GLUCOSE  314*  174*  225*  217*     Recent Labs      02/28/19   2330  03/01/19   0400  03/02/19   0328   WBC  13.9*  16.8*  12.2*   NEUTSPOLYS  84.40*  74.20*  61.70   LYMPHOCYTES  11.00*  11.20*  9.60*   MONOCYTES  3.20  6.00  4.30   EOSINOPHILS  0.10  0.00  0.00   BASOPHILS  0.40  0.00  0.90   ASTSGOT  53*  46*  27   ALTSGPT  24  23  16   ALKPHOSPHAT  319*  315*  181*   TBILIRUBIN  0.4  0.4  0.5     Recent Labs      02/28/19   1822  02/28/19   2330  03/01/19   0400  03/02/19   0328   RBC  5.36  4.77  4.87  4.03*   HEMOGLOBIN  15.8  13.9  14.2  11.5*    HEMATOCRIT  49.3*  44.7  44.7  36.0*   PLATELETCT  288  267  248  174   PROTHROMBTM  17.8*  19.3*   --    --    APTT  39.5*   --    --    --    INR  1.45*  1.62*   --    --        Imaging  CT:    Reviewed    Assessment/Plan  * Septic shock (HCC)   Assessment & Plan    Continue sepsis protocols  Empiric abx w zosyn  F/u cultures - UCx w LFGNRs  Titrate levophed to maintain map > 65  Continue vasopressin  Will consider steroids if further escalation  Source Urinary +/- GI       Acute encephalopathy   Assessment & Plan    Improving slightly  Still confused but appears less  Minimize mind altering/sedating meds  Ct with indeterminate region of concern - possible ischemia, was not candidate for tpa, initiated on medical therapy, pending mri brain     Lactic acidosis   Assessment & Plan    Trend     JOSE (acute kidney injury) ([facility])   Assessment & Plan    Suspect atn/prerenal  Renally dose medications  Minimize nephrotoxic substances  Monitor urine output  Trending towards improvement  Continue Na Acetate - recheck bmp this afternoon and consider transition of fluids     Urinary tract infection   Assessment & Plan    F/u Ucx  Continue abx     Hypotension   Assessment & Plan    Shock from UTI and/or abdominal source. Continue to titrate fluids, pressors, possible ex lap   As per sepsis section     Abnormal LFTs   Assessment & Plan    Trend     DM (diabetes mellitus) ([facility])   Assessment & Plan    Ssi + fsbs     CN (constipation)   Assessment & Plan    Continues to have BMs  abdo pain improving          VTE:  Heparin  Ulcer: Not Indicated  Lines: Central Line  Ongoing indication addressed and Crabtree Catheter  Ongoing indication addressed    I have performed a physical exam and reviewed and updated ROS and Plan today (3/2/2019). In review of yesterday's note (3/1/2019), there are no changes except as documented above.     Discussed patient condition and risk of morbidity and/or mortality with Hospitalist, Family, RN, RT,  Therapies, Pharmacy and Patient  The patient remains critically ill.  Critical care time = 78 minutes in directly providing and coordinating critical care and extensive data review.  No time overlap and excludes procedures.

## 2019-03-02 NOTE — PROGRESS NOTES
"Surgery    Remains on vasopressors  Clear liquids ordered  CT reviewed: No sign of intestinal ischemia   Decreased colonic stool with continued gastric distention on follow-up imaging    BP (!) 91/57   Pulse (!) 122   Temp 36.7 °C (98 °F) (Temporal)   Resp (!) 21   Ht 1.727 m (5' 8\")   Wt 113 kg (249 lb 1.9 oz)   SpO2 91%   Breastfeeding? No   BMI 37.88 kg/m²     Mental status poor  Abdomen softly distended    Recent Labs      02/28/19   2330  03/01/19   0400  03/02/19   0328   WBC  13.9*  16.8*  12.2*   RBC  4.77  4.87  4.03*   HEMOGLOBIN  13.9  14.2  11.5*   HEMATOCRIT  44.7  44.7  36.0*   MCV  93.7  91.8  89.3   MCH  29.1  29.2  28.5   RDW  47.8  47.8  46.9   PLATELETCT  267  248  174   MPV  9.5  9.3  9.6   NEUTSPOLYS  84.40*  74.20*  61.70   LYMPHOCYTES  11.00*  11.20*  9.60*   MONOCYTES  3.20  6.00  4.30   EOSINOPHILS  0.10  0.00  0.00   BASOPHILS  0.40  0.00  0.90   RBCMORPHOLO   --   Present  Present     Recent Labs      03/01/19   0400  03/01/19   1432  03/02/19   0328   SODIUM  139  138  134*   POTASSIUM  5.7*  5.1  3.7   CHLORIDE  114*  114*  111   CO2  13*  12*  16*   GLUCOSE  174*  225*  217*   BUN  43*  46*  43*       Assessment and plan:  63-year-old morbidly obese female with multiple medical comorbidities with urosepsis and no findings on CT that would indicate need for surgical invention.    Recommending holding off on diet until patient off vasopressors  Management per medical service    Signing off, please contact us if there is any question or concern  "

## 2019-03-03 PROBLEM — E87.6 HYPOKALEMIA: Status: ACTIVE | Noted: 2019-03-03

## 2019-03-03 PROBLEM — E83.51 HYPOCALCEMIA: Status: ACTIVE | Noted: 2019-03-03

## 2019-03-03 LAB
ACTION RANGE TRIGGERED IACRT: NO
ALBUMIN SERPL BCP-MCNC: 2.6 G/DL (ref 3.2–4.9)
ANION GAP SERPL CALC-SCNC: 6 MMOL/L (ref 0–11.9)
BACTERIA UR CULT: ABNORMAL
BACTERIA UR CULT: ABNORMAL
BASE EXCESS BLDA CALC-SCNC: 0 MMOL/L (ref -4–3)
BASOPHILS # BLD AUTO: 0 % (ref 0–1.8)
BASOPHILS # BLD: 0 K/UL (ref 0–0.12)
BODY TEMPERATURE: NORMAL DEGREES
BUN SERPL-MCNC: 29 MG/DL (ref 8–22)
CALCIUM SERPL-MCNC: 6.3 MG/DL (ref 8.5–10.5)
CHLORIDE SERPL-SCNC: 105 MMOL/L (ref 96–112)
CO2 BLDA-SCNC: 25 MMOL/L (ref 20–33)
CO2 SERPL-SCNC: 26 MMOL/L (ref 20–33)
CREAT SERPL-MCNC: 0.94 MG/DL (ref 0.5–1.4)
EOSINOPHIL # BLD AUTO: 0.25 K/UL (ref 0–0.51)
EOSINOPHIL NFR BLD: 3 % (ref 0–6.9)
ERYTHROCYTE [DISTWIDTH] IN BLOOD BY AUTOMATED COUNT: 47.2 FL (ref 35.9–50)
EST. AVERAGE GLUCOSE BLD GHB EST-MCNC: 151 MG/DL
GLUCOSE BLD-MCNC: 158 MG/DL (ref 65–99)
GLUCOSE BLD-MCNC: 169 MG/DL (ref 65–99)
GLUCOSE BLD-MCNC: 185 MG/DL (ref 65–99)
GLUCOSE BLD-MCNC: 195 MG/DL (ref 65–99)
GLUCOSE BLD-MCNC: 206 MG/DL (ref 65–99)
GLUCOSE SERPL-MCNC: 195 MG/DL (ref 65–99)
HBA1C MFR BLD: 6.9 % (ref 0–5.6)
HCO3 BLDA-SCNC: 23.8 MMOL/L (ref 17–25)
HCT VFR BLD AUTO: 30.9 % (ref 37–47)
HGB BLD-MCNC: 10 G/DL (ref 12–16)
HOROWITZ INDEX BLDA+IHG-RTO: 1680 MM[HG]
INST. QUALIFIED PATIENT IIQPT: YES
LACTATE BLD-SCNC: 1.9 MMOL/L (ref 0.5–2)
LYMPHOCYTES # BLD AUTO: 1.74 K/UL (ref 1–4.8)
LYMPHOCYTES NFR BLD: 21 % (ref 22–41)
MANUAL DIFF BLD: ABNORMAL
MCH RBC QN AUTO: 28.7 PG (ref 27–33)
MCHC RBC AUTO-ENTMCNC: 32.4 G/DL (ref 33.6–35)
MCV RBC AUTO: 88.8 FL (ref 81.4–97.8)
METAMYELOCYTES NFR BLD MANUAL: 1 %
MONOCYTES # BLD AUTO: 0.58 K/UL (ref 0–0.85)
MONOCYTES NFR BLD AUTO: 7 % (ref 0–13.4)
MORPHOLOGY BLD-IMP: NORMAL
NEUTROPHILS # BLD AUTO: 5.64 K/UL (ref 2–7.15)
NEUTROPHILS NFR BLD: 53 % (ref 44–72)
NEUTS BAND NFR BLD MANUAL: 15 % (ref 0–10)
NRBC # BLD AUTO: 0 K/UL
NRBC BLD-RTO: 0 /100 WBC
O2/TOTAL GAS SETTING VFR VENT: 5 %
PCO2 BLDA: 35.1 MMHG (ref 26–37)
PCO2 TEMP ADJ BLDA: 32.7 MMHG (ref 26–37)
PH BLDA: 7.44 [PH] (ref 7.4–7.5)
PH TEMP ADJ BLDA: 7.46 [PH] (ref 7.4–7.5)
PLATELET # BLD AUTO: 157 K/UL (ref 164–446)
PLATELET BLD QL SMEAR: NORMAL
PMV BLD AUTO: 10.3 FL (ref 9–12.9)
PO2 BLDA: 84 MMHG (ref 64–87)
PO2 TEMP ADJ BLDA: 75 MMHG (ref 64–87)
POTASSIUM SERPL-SCNC: 3.4 MMOL/L (ref 3.6–5.5)
POTASSIUM SERPL-SCNC: 3.6 MMOL/L (ref 3.6–5.5)
RBC # BLD AUTO: 3.48 M/UL (ref 4.2–5.4)
RBC BLD AUTO: NORMAL
SAO2 % BLDA: 97 % (ref 93–99)
SIGNIFICANT IND 70042: ABNORMAL
SITE SITE: ABNORMAL
SODIUM SERPL-SCNC: 137 MMOL/L (ref 135–145)
SOURCE SOURCE: ABNORMAL
SPECIMEN DRAWN FROM PATIENT: NORMAL
WBC # BLD AUTO: 8.3 K/UL (ref 4.8–10.8)

## 2019-03-03 PROCEDURE — 83036 HEMOGLOBIN GLYCOSYLATED A1C: CPT

## 2019-03-03 PROCEDURE — 82040 ASSAY OF SERUM ALBUMIN: CPT

## 2019-03-03 PROCEDURE — 700105 HCHG RX REV CODE 258: Performed by: INTERNAL MEDICINE

## 2019-03-03 PROCEDURE — 700111 HCHG RX REV CODE 636 W/ 250 OVERRIDE (IP): Performed by: INTERNAL MEDICINE

## 2019-03-03 PROCEDURE — A9270 NON-COVERED ITEM OR SERVICE: HCPCS | Performed by: HOSPITALIST

## 2019-03-03 PROCEDURE — 770022 HCHG ROOM/CARE - ICU (200)

## 2019-03-03 PROCEDURE — 700111 HCHG RX REV CODE 636 W/ 250 OVERRIDE (IP): Performed by: HOSPITALIST

## 2019-03-03 PROCEDURE — 36600 WITHDRAWAL OF ARTERIAL BLOOD: CPT

## 2019-03-03 PROCEDURE — 700101 HCHG RX REV CODE 250: Performed by: INTERNAL MEDICINE

## 2019-03-03 PROCEDURE — 700102 HCHG RX REV CODE 250 W/ 637 OVERRIDE(OP): Performed by: HOSPITALIST

## 2019-03-03 PROCEDURE — 82962 GLUCOSE BLOOD TEST: CPT

## 2019-03-03 PROCEDURE — 85007 BL SMEAR W/DIFF WBC COUNT: CPT

## 2019-03-03 PROCEDURE — 99233 SBSQ HOSP IP/OBS HIGH 50: CPT | Performed by: HOSPITALIST

## 2019-03-03 PROCEDURE — 83605 ASSAY OF LACTIC ACID: CPT

## 2019-03-03 PROCEDURE — 99291 CRITICAL CARE FIRST HOUR: CPT | Performed by: INTERNAL MEDICINE

## 2019-03-03 PROCEDURE — 84132 ASSAY OF SERUM POTASSIUM: CPT

## 2019-03-03 PROCEDURE — 85027 COMPLETE CBC AUTOMATED: CPT

## 2019-03-03 PROCEDURE — 80048 BASIC METABOLIC PNL TOTAL CA: CPT

## 2019-03-03 PROCEDURE — 82803 BLOOD GASES ANY COMBINATION: CPT

## 2019-03-03 RX ORDER — OMEPRAZOLE 20 MG/1
20 CAPSULE, DELAYED RELEASE ORAL DAILY
Status: DISCONTINUED | OUTPATIENT
Start: 2019-03-03 | End: 2019-03-04

## 2019-03-03 RX ORDER — POTASSIUM CHLORIDE 20 MEQ/1
40 TABLET, EXTENDED RELEASE ORAL ONCE
Status: DISCONTINUED | OUTPATIENT
Start: 2019-03-03 | End: 2019-03-03

## 2019-03-03 RX ORDER — SODIUM CHLORIDE, SODIUM LACTATE, POTASSIUM CHLORIDE, CALCIUM CHLORIDE 600; 310; 30; 20 MG/100ML; MG/100ML; MG/100ML; MG/100ML
INJECTION, SOLUTION INTRAVENOUS CONTINUOUS
Status: DISCONTINUED | OUTPATIENT
Start: 2019-03-03 | End: 2019-03-10

## 2019-03-03 RX ORDER — POTASSIUM CHLORIDE 29.8 MG/ML
40 INJECTION INTRAVENOUS ONCE
Status: COMPLETED | OUTPATIENT
Start: 2019-03-03 | End: 2019-03-03

## 2019-03-03 RX ADMIN — NOREPINEPHRINE BITARTRATE 8 MCG/MIN: 1 INJECTION INTRAVENOUS at 12:52

## 2019-03-03 RX ADMIN — MORPHINE SULFATE 5 MG: 10 INJECTION INTRAVENOUS at 09:50

## 2019-03-03 RX ADMIN — HEPARIN SODIUM 5000 UNITS: 5000 INJECTION, SOLUTION INTRAVENOUS; SUBCUTANEOUS at 21:08

## 2019-03-03 RX ADMIN — PIPERACILLIN SODIUM AND TAZOBACTAM SODIUM 3.38 G: 3; .375 INJECTION, POWDER, FOR SOLUTION INTRAVENOUS at 13:55

## 2019-03-03 RX ADMIN — POTASSIUM CHLORIDE 40 MEQ: 29.8 INJECTION, SOLUTION INTRAVENOUS at 13:56

## 2019-03-03 RX ADMIN — HEPARIN SODIUM 5000 UNITS: 5000 INJECTION, SOLUTION INTRAVENOUS; SUBCUTANEOUS at 13:55

## 2019-03-03 RX ADMIN — PIPERACILLIN SODIUM AND TAZOBACTAM SODIUM 3.38 G: 3; .375 INJECTION, POWDER, FOR SOLUTION INTRAVENOUS at 05:23

## 2019-03-03 RX ADMIN — SODIUM CHLORIDE, POTASSIUM CHLORIDE, SODIUM LACTATE AND CALCIUM CHLORIDE: 600; 310; 30; 20 INJECTION, SOLUTION INTRAVENOUS at 09:17

## 2019-03-03 RX ADMIN — LEVOTHYROXINE SODIUM 150 MCG: 150 TABLET ORAL at 05:13

## 2019-03-03 RX ADMIN — INSULIN HUMAN 2 UNITS: 100 INJECTION, SOLUTION PARENTERAL at 06:06

## 2019-03-03 RX ADMIN — MORPHINE SULFATE 2 MG: 10 INJECTION INTRAVENOUS at 12:28

## 2019-03-03 RX ADMIN — MORPHINE SULFATE 3 MG: 10 INJECTION INTRAVENOUS at 02:40

## 2019-03-03 RX ADMIN — VASOPRESSIN 0.03 UNITS/MIN: 20 INJECTION INTRAVENOUS at 06:40

## 2019-03-03 RX ADMIN — INSULIN HUMAN 1 UNITS: 100 INJECTION, SOLUTION PARENTERAL at 18:05

## 2019-03-03 RX ADMIN — PIPERACILLIN SODIUM AND TAZOBACTAM SODIUM 3.38 G: 3; .375 INJECTION, POWDER, FOR SOLUTION INTRAVENOUS at 21:08

## 2019-03-03 RX ADMIN — SODIUM ACETATE: 3.28 INJECTION, SOLUTION, CONCENTRATE INTRAVENOUS at 05:24

## 2019-03-03 RX ADMIN — MORPHINE SULFATE 5 MG: 10 INJECTION INTRAVENOUS at 21:17

## 2019-03-03 RX ADMIN — HEPARIN SODIUM 5000 UNITS: 5000 INJECTION, SOLUTION INTRAVENOUS; SUBCUTANEOUS at 05:24

## 2019-03-03 RX ADMIN — CALCIUM GLUCONATE 1 G: 98 INJECTION, SOLUTION INTRAVENOUS at 12:25

## 2019-03-03 RX ADMIN — INSULIN HUMAN 1 UNITS: 100 INJECTION, SOLUTION PARENTERAL at 12:25

## 2019-03-03 RX ADMIN — INSULIN HUMAN 1 UNITS: 100 INJECTION, SOLUTION PARENTERAL at 00:10

## 2019-03-03 RX ADMIN — MORPHINE SULFATE 5 MG: 10 INJECTION INTRAVENOUS at 16:12

## 2019-03-03 NOTE — PROGRESS NOTES
Pt. Noted to be flushed and warm to the touch at 1500, temporal temp 101.6. Patient previously afebrile all morning, temps running 97-98. Patient given tylenol per PRN MAR. Rechecked temp at 1600, temporal temp 102. Called Dr. Shipley. MD updated and aware of changes and current culture results.

## 2019-03-03 NOTE — PROGRESS NOTES
2 RN skin check complete with STACEY Ramsey.   Devices in place: silicone nasal cannula, blood pressure cuff to upper arm, finger oxygen probe, bilateral calf SCDs, Crabtree catheter, rectal tube.     Skin assessed under listed devices.     No new pressure ulcers. PTA scabbing/abrasion to bottom of right foot. IAD and redness to sacrum.     The following interventions in place: pillows used to reposition and float heels. Crabtree and rectal tube padded with pillowcases. Blood pressure cuff rotated. Barrier cream to sacrum.

## 2019-03-03 NOTE — PROGRESS NOTES
[facility] Medicine Daily Progress Note    Date of Service  3/3/2019    Chief Complaint  Abdominal pain    Hospital Course    63 y.o. obese female with hypertension, hypothyroid, diabetes, depression admitted 2/28/2019 with sepsis due to UTI.  She initially presented with complaints of nausea, vomiting and concerns of multiple syncopal episodes on day of admission.  Her blood pressure was 50 upon emergency medical staff response at her home.  In the emergency room she was hypotensive with systolics in the 70s.  CT scan was performed in the emergency room with concerns of constipation but negative for any aneurysm or dissection on CTA.      Interval Problem Update  Coughing with taking pills.  On 3mcg vaso and on levophed  Replace Calcium  NPO      Consultants/Specialty  Pulmonary/Intensivist    Code Status  FULL    Disposition  Monitor in ICU    Review of Systems  Review of Systems   Unable to perform ROS: Mental acuity        Physical Exam  Temp:  [36.6 °C (97.8 °F)-39.6 °C (103.2 °F)] 37.7 °C (99.9 °F)  Pulse:  [] 107  Resp:  [9-33] 16  SpO2:  [82 %-96 %] 92 %    Physical Exam   Constitutional: She appears well-developed. She appears lethargic. She appears ill. No distress.   Obese   HENT:   Head: Normocephalic and atraumatic.   Nose: Nose normal.   Mouth/Throat: Oropharyngeal exudate (question of thrush) present.   Eyes: Conjunctivae and EOM are normal. Right eye exhibits no discharge. Left eye exhibits no discharge. No scleral icterus.   Neck: Neck supple. No tracheal deviation present.   Cardiovascular: Normal rate, regular rhythm and normal heart sounds.    No murmur heard.  Pulses:       Radial pulses are 2+ on the right side, and 2+ on the left side.        Dorsalis pedis pulses are 2+ on the right side, and 2+ on the left side.   Pulmonary/Chest: Effort normal and breath sounds normal. No respiratory distress. She has no wheezes. She has no rales.   Abdominal: Soft. Bowel sounds are normal. She exhibits  no distension. There is tenderness (bilateral flanks).   Musculoskeletal: She exhibits no edema.   Neurological: She appears lethargic. She is disoriented. No cranial nerve deficit.   Skin: Skin is warm. She is not diaphoretic.   Psychiatric: Her speech is delayed. She is slowed. Cognition and memory are impaired.   Vitals reviewed.      Fluids    Intake/Output Summary (Last 24 hours) at 03/03/19 1441  Last data filed at 03/03/19 1400   Gross per 24 hour   Intake          3396.59 ml   Output             2815 ml   Net           581.59 ml       Laboratory  Recent Labs      03/01/19   0400  03/02/19   0328  03/03/19   0319   WBC  16.8*  12.2*  8.3   RBC  4.87  4.03*  3.48*   HEMOGLOBIN  14.2  11.5*  10.0*   HEMATOCRIT  44.7  36.0*  30.9*   MCV  91.8  89.3  88.8   MCH  29.2  28.5  28.7   MCHC  31.8*  31.9*  32.4*   RDW  47.8  46.9  47.2   PLATELETCT  248  174  157*   MPV  9.3  9.6  10.3     Recent Labs      03/02/19   0328  03/02/19   1520  03/03/19   0319   SODIUM  134*  136  137   POTASSIUM  3.7  3.6  3.4*   CHLORIDE  111  106  105   CO2  16*  20  26   GLUCOSE  217*  210*  195*   BUN  43*  37*  29*   CREATININE  1.46*  1.29  0.94   CALCIUM  7.0*  7.1*  6.3*     Recent Labs      02/28/19   1822  02/28/19   2330   APTT  39.5*   --    INR  1.45*  1.62*     Recent Labs      02/28/19   1822   BNPBTYPENAT  13           Imaging  EC-ECHOCARDIOGRAM COMPLETE W/ CONT   Final Result      CT-HEAD W/O   Final Result   Addendum 1 of 1   Call report initiated at 1:40 PM on 3/1/2019.      These findings were discussed with LARRY WISEMAN on 3/1/2019 2:24 PM.      Final      1.  Subtle increased density of LEFT middle cerebral artery and sylvian branches possibly indicating slow flow or occlusion and potential early sign of acute stroke.   2.  No intracranial hemorrhage.   3.  Mild atrophy.      CT-ABDOMEN-PELVIS W/O   Final Result      Decrease in volume of colonic stool with no abnormal bowel dilatation detected      New third spacing  with some new mild abdominopelvic ascites. No free air is seen to indicate extravasation. No loculated fluid collection to suggest abscess is noted      No pneumatosis detected to suggest bowel ischemia         DX-ABDOMEN FOR TUBE PLACEMENT   Final Result      Enteric tube terminates over the gastroduodenal junction.      DX-CHEST-LIMITED (1 VIEW)   Final Result      New right IJ line tip overlies the SVC      No radiographic evidence of acute cardiopulmonary process.      DX-CHEST-PORTABLE (1 VIEW)   Final Result      No acute cardiopulmonary abnormality.      CT-CTA COMPLETE THORACOABDOMINAL AORTA   Final Result      1. No evidence of aortic dissection or aneurysm. No high-grade stenosis or occlusion of major pelvic branches vessels.   2. No pulmonary embolus.   3. Moderate to large amount of stool throughout the colon.            MR-BRAIN-W/O    (Results Pending)        Assessment/Plan  * Septic shock (HCC)   Assessment & Plan    This is severe sepsis with the following associated acute organ dysfunction(s): acute kidney failure.   IV fluids  Monitor CVP  3/1 cortisol level:37 and TSH: 1.1  Strict I's and O's  Pressor support to keep map greater than 65 and systolic blood pressure greater than 90  Sepsis protocol  Antibiotics  Suspect UTI/pyelonephritis.    Urine culture growing Ecoli  CT Abd w/o clear source  Monitor labs, vitals     Acute encephalopathy   Assessment & Plan    Normal TSH  Likely sepsis from UTI treat with abx  Abnormal CT Head.  Unfortunately unable to perform CTA head and neck secondary to renal function.  MRA with gadolinium would also be not recommended.  I have discussed with Dr. Philip Patiño is initiating high intensity statin and an aspirin.  Monitor neurochecks  Near future MRI Brain once able to hold still     Lactic acidosis   Assessment & Plan    Improved CO2  Continue IV fluids     JOSE (acute kidney injury) (HCC)   Assessment & Plan    Concern of prerenal state.  Giving IV  fluids  Monitor I's and O's  3/2 BUN:37, Cr:1.29  3/1/19 BUN: 43 creatinine: 1.83  Avoid nephrotoxins       Urinary tract infection   Assessment & Plan    Follow urine culture (as of 3/2 growing LF GNR)  Monitor labs  IV antibiotics  IV fluids  Monitor labs, vitals,   3/3 WBC: 8.3  3/2 WBC:12.2  3/1 WBC:16.8  2/28 WBC:13.9     Hypotension   Assessment & Plan    Pressor support to keep MAP >65 and SBP >90  Monitor I/O's, vitals, labs     DM (diabetes mellitus) ([facility])   Assessment & Plan    Monitor Accu-Cheks covering sliding scale insulin  Outpatient she was on metformin 1000 mg twice daily.  Metformin currently being held  3/3 A1c: 6.9     Depression   Assessment & Plan    Continue Elavil and Prozac     Hypokalemia   Assessment & Plan    3/3 K:3.4  Supplement and check tomorrow labs     Hypocalcemia   Assessment & Plan    3/3 Corrected Calcium for albumin: 7.4  Calcium supplemented 3/3  Monitor     Class 2 severe obesity due to excess calories with serious comorbidity and body mass index (BMI) of 37.0 to 37.9 in adult ([facility])   Assessment & Plan    Body mass index is 39.52 kg/m².  Will need further encouragement and education on lifestyle modifications and dietary changes     CN (constipation)   Assessment & Plan    IV fluids  Bowel protocol  Required a BMS system 3/1     Abnormal LFTs   Assessment & Plan    Obese concern of hepatic congestion from possible right-sided heart failure.  Monitor CMP  3/1/19 CT abdomen/pelvis:   Decrease in volume of colonic stool with no abnormal bowel dilatation detected   New third spacing with some new mild abdominopelvic ascites. No free air is seen to indicate extravasation. No loculated fluid collection to suggest  abscess is noted   No pneumatosis detected to suggest bowel ischemia  2/28/19 CT aortogram without acute dissection or aneurysm          VTE prophylaxis: heparin

## 2019-03-03 NOTE — PROGRESS NOTES
Dr. Maravilla, intensivist, updated on patient's increasing oxygen demands and continued lethargy. Orders received for ABG and instructed to check blood glucose.

## 2019-03-03 NOTE — CARE PLAN
Problem: Hemodynamic Status  Goal: Vital Signs and Fluid Balance Management  Outcome: PROGRESSING AS EXPECTED  Blood pressure monitored every 15 minutes, with titration of vasopressor support to maintain MAP >65.    Problem: Physical Regulation:  Goal: Diagnostic test results will improve  Outcome: PROGRESSING AS EXPECTED  Lactic acids drawn every 6 hours, most recent 1.6 (decrease from prior. Monitoring lab results.   Goal: Signs and symptoms of infection will decrease  Outcome: PROGRESSING AS EXPECTED  Antibiotics administered as prescribed. Central line ports scrubbed prior to access. Crabtree care provided with soap and water. CHG bath given.

## 2019-03-03 NOTE — PROGRESS NOTES
Critical Care Progress Note    Date of admission  2/28/2019    Chief Complaint  63 y.o. female admitted 2/28/2019 with weakness and abdo pain    Hospital Course    63 y.o. female who presented 2/28/2019 with several days of constipation but feeling much weaker on day of hospital admission. Feels that stomach is bloated. Denies focal abdomina pain, vomiting, dysuria, chest pain, SOB, fever, sweats, chills, illness among others at home. In ED noted to SBPs in the 60s and given 4.5 liters of NS. A central line was placed and pt started on Levophed for maintain a MAP >=65. Cultures of blood and urine taken and given  Empiric ceftriaxone for pesumed UTI. CT of chest abdomen/pelvis done in ED and read by radiology as showing on acute large amount stool but otherwise no impression of PE on CT scan and not acute pthology       Interval Problem Update  Reviewed last 24 hour events:  Tm 103.2  +258cc over last 24hr, +462cc since admit  Ct head 3/1 reviewed  Echo 3/2 Ef 70%  Wbc 13->16->12 w 23% bands ->8.3 w 15% bands  K 3.4  Cr 1.7->1.8->1.4  LA 2.9->3.3->2.6->1.9  Cor Bill 7.4    Bcx 2/28 ngtd  Ucx 2/28 LFGNRs    Zosyn 2/28-p    Levophed @ 3  Vaso @ 0.03  Na Acetate @ 100    6L Ncx 93%  Last Bm 3/2      Review of Systems  Review of Systems   Unable to perform ROS: Acuity of condition        Vital Signs for last 24 hours   Temp:  [36.2 °C (97.2 °F)-39.6 °C (103.2 °F)] 36.8 °C (98.2 °F)  Pulse:  [] 100  Resp:  [9-36] 17  SpO2:  [82 %-96 %] 93 %    Hemodynamic parameters for last 24 hours  CVP:  [2 MM HG-13 MM HG] 6 MM HG    Respiratory Information for the last 24 hours       Physical Exam   Physical Exam   Constitutional: She appears well-developed.   Acutely ill   HENT:   Head: Normocephalic and atraumatic.   Eyes: Pupils are equal, round, and reactive to light. No scleral icterus.   Neck: No tracheal deviation present.   Cardiovascular: Normal rate and intact distal pulses.    Pulmonary/Chest: She has no wheezes. She  has no rales.   Abdominal: She exhibits distension. There is tenderness. There is rebound. There is no guarding.   Musculoskeletal: She exhibits edema.   Neurological: She is alert. No cranial nerve deficit.   Moving all fours, having trouble with word finding, otherwise appears to be nonfocal   Skin: Skin is warm and dry.   Psychiatric:   Pleasant but encephalopathic   Nursing note and vitals reviewed.      Medications  Current Facility-Administered Medications   Medication Dose Route Frequency Provider Last Rate Last Dose   • piperacillin-tazobactam (ZOSYN) 3.375 g in  mL IVPB  3.375 g Intravenous Q8HRS Juan Maravilla M.D. 25 mL/hr at 03/03/19 0523 3.375 g at 03/03/19 0523   • vasopressin (VASOSTRICT) 20 Units in  mL Infusion  0.03 Units/min Intravenous Continuous Ace Patiño M.D. 9 mL/hr at 03/03/19 0640 0.03 Units/min at 03/03/19 0640   • acetaminophen (TYLENOL) tablet 650 mg  650 mg Oral Q4HRS PRN MESERET GrangerOAngela   650 mg at 03/02/19 1952   • sodium acetate 150 mEq in sterile water for inj(pf) 1,000 mL infusion   Intravenous Continuous Ace Patiño M.D. 100 mL/hr at 03/03/19 0524     • aspirin (ASA) tablet 325 mg  325 mg Oral DAILY MESERET GrangerO.   Stopped at 03/03/19 0513   • atorvastatin (LIPITOR) tablet 40 mg  40 mg Oral Q EVENING KWAKU Granger.O.   40 mg at 03/02/19 1759   • morphine (pf) 10 mg/mL injection 2-5 mg  2-5 mg Intravenous Q2HRS PRN MESERET GrangerO.   3 mg at 03/03/19 0240   • NS (BOLUS) infusion 500 mL  500 mL Intravenous Once PRN Kareem Solis M.D.       • norepinephrine (LEVOPHED) 8 mg in  mL Infusion  0-60 mcg/min Intravenous Continuous Juan Maravilla M.D. 5.6 mL/hr at 03/02/19 2210 3 mcg/min at 03/02/19 2210   • senna-docusate (PERICOLACE or SENOKOT S) 8.6-50 MG per tablet 2 Tab  2 Tab Oral BID Kareem Solis M.D.   Stopped at 03/01/19 0600    And   • polyethylene glycol/lytes (MIRALAX) PACKET 1 Packet  1 Packet Oral QDAY PRN  Kareem Solis M.D.        And   • magnesium hydroxide (MILK OF MAGNESIA) suspension 30 mL  30 mL Oral QDAY PRN Kareem Solis M.D.        And   • bisacodyl (DULCOLAX) suppository 10 mg  10 mg Rectal QDAY PRN Kareem Solis M.D.   10 mg at 02/28/19 2221   • Respiratory Care per Protocol   Nebulization Continuous RT Kareem Solis M.D.       • heparin injection 5,000 Units  5,000 Units Subcutaneous Q8HRS Kareem Solis M.D.   5,000 Units at 03/03/19 0524   • ondansetron (ZOFRAN) syringe/vial injection 4 mg  4 mg Intravenous Q4HRS PRN Kareem Solis M.D.   4 mg at 03/01/19 0137   • ondansetron (ZOFRAN ODT) dispertab 4 mg  4 mg Oral Q4HRS PRN Kareem Solis M.D.       • promethazine (PHENERGAN) tablet 12.5-25 mg  12.5-25 mg Oral Q4HRS PRN Kareem Solis M.D.       • promethazine (PHENERGAN) suppository 12.5-25 mg  12.5-25 mg Rectal Q4HRS PRN Kareem Solis M.D.       • prochlorperazine (COMPAZINE) injection 5-10 mg  5-10 mg Intravenous Q4HRS PRN Kareem Solis M.D.       • amitriptyline (ELAVIL) tablet 300 mg  300 mg Oral Nightly Kareem Solis M.D.   300 mg at 03/02/19 2103   • FLUoxetine (PROZAC) capsule 40 mg  40 mg Oral DAILY Kareem Solis M.D.   Stopped at 03/03/19 0513   • gabapentin (NEURONTIN) capsule 600 mg  600 mg Oral TID Kareem Solis M.D.   Stopped at 03/03/19 0514   • levothyroxine (SYNTHROID) tablet 150 mcg  150 mcg Oral AM ES Kareem Solis M.D.   150 mcg at 03/03/19 0513   • tizanidine (ZANAFLEX) tablet 2 mg  2 mg Oral TID PRN Kareem Solis M.D.       • insulin regular (HUMULIN R) injection 1-6 Units  1-6 Units Subcutaneous Q6HRS Kareem Solis M.D.   2 Units at 03/03/19 0606    And   • glucose 4 g chewable tablet 16 g  16 g Oral Q15 MIN PRN Kareem Solis M.D.        And   • dextrose 50% (D50W) injection 25 mL  25 mL Intravenous Q15 MIN PRN Kareem Solis M.D.           Fluids    Intake/Output Summary (Last 24 hours) at 03/03/19 0710  Last data  filed at 03/03/19 0600   Gross per 24 hour   Intake          3543.15 ml   Output             3285 ml   Net           258.15 ml       Laboratory  Recent Labs      03/01/19   1512  03/03/19   0501   ISTATAPH  7.240*  7.438   ISTATAPCO2  21.2*  35.1   ISTATAPO2  59*  84   ISTATATCO2  10*  25   OUMHFJA4MNB  86*  97   ISTATARTHCO3  9.1*  23.8   ISTATARTBE  -16*  0   ISTATTEMP  98.5 F  95.6 F   ISTATFIO2  21  5   ISTATSPEC  Arterial  Arterial   ISTATAPHTC  7.241*  7.463   [id number]  59*  75     Recent Labs      02/28/19   1822   TROPONINI  <0.01   BNPBTYPENAT  13     Recent Labs      03/01/19   1432  03/02/19   0328  03/02/19   1520   SODIUM  138  134*  136   POTASSIUM  5.1  3.7  3.6   CHLORIDE  114*  111  106   CO2  12*  16*  20   BUN  46*  43*  37*   CREATININE  1.74*  1.46*  1.29   CALCIUM  7.3*  7.0*  7.1*     Recent Labs      02/28/19   2330  03/01/19   0400  03/01/19   1432  03/02/19   0328  03/02/19   1520   ALTSGPT  24  23   --   16   --    ASTSGOT  53*  46*   --   27   --    ALKPHOSPHAT  319*  315*   --   181*   --    TBILIRUBIN  0.4  0.4   --   0.5   --    AMYLASE  885*   --    --    --    --    LIPASE  133*   --    --   19   --    GLUCOSE  314*  174*  225*  217*  210*     Recent Labs      02/28/19   2330  03/01/19   0400  03/02/19   0328   WBC  13.9*  16.8*  12.2*   NEUTSPOLYS  84.40*  74.20*  61.70   LYMPHOCYTES  11.00*  11.20*  9.60*   MONOCYTES  3.20  6.00  4.30   EOSINOPHILS  0.10  0.00  0.00   BASOPHILS  0.40  0.00  0.90   ASTSGOT  53*  46*  27   ALTSGPT  24  23  16   ALKPHOSPHAT  319*  315*  181*   TBILIRUBIN  0.4  0.4  0.5     Recent Labs      02/28/19   1822  02/28/19   2330  03/01/19   0400  03/02/19   0328   RBC  5.36  4.77  4.87  4.03*   HEMOGLOBIN  15.8  13.9  14.2  11.5*   HEMATOCRIT  49.3*  44.7  44.7  36.0*   PLATELETCT  288  267  248  174   PROTHROMBTM  17.8*  19.3*   --    --    APTT  39.5*   --    --    --    INR  1.45*  1.62*   --    --        Imaging  Echo:   Reviewed    Assessment/Plan  *  Septic shock ([facility])   Assessment & Plan    Continue sepsis protocols  Empiric abx w zosyn  F/u cultures - UCx w LFGNRs -follow up organism and susceptibility  Continue to titrate levophed to maintain map > 65  Continue vasopressin  No indication for steroids at this time  Source Urinary +/- GI  (cannot rule out translocation)     Acute encephalopathy   Assessment & Plan    Improving slightly  Still confused but appears less  Minimize mind altering/sedating meds  Ct with indeterminate region of concern - possible ischemia, was not candidate for tpa, initiated on medical therapy, pending mri brain     Lactic acidosis   Assessment & Plan    Trend     JOSE (acute kidney injury) ([facility])   Assessment & Plan    Suspect atn/prerenal  Renally dose medications  Minimize nephrotoxic substances  Monitor urine output  Improving, DC sodium acetate  Start LR as maintenance fluid     Urinary tract infection   Assessment & Plan    F/u Ucx  Continue abx     Hypotension   Assessment & Plan    Shock from UTI and/or abdominal source. Continue to titrate fluids, pressors, possible ex lap   As per sepsis section     Abnormal LFTs   Assessment & Plan    Trend     DM (diabetes mellitus) ([facility])   Assessment & Plan    Ssi + fsbs     CN (constipation)   Assessment & Plan    Continues to have BMs  Now liquidy in nature          VTE:  Heparin  Ulcer: Not Indicated  Lines: Central Line  Ongoing indication addressed and Crabtree Catheter  Ongoing indication addressed    I have performed a physical exam and reviewed and updated ROS and Plan today (3/3/2019). In review of yesterday's note (3/2/2019), there are no changes except as documented above.     Discussed patient condition and risk of morbidity and/or mortality with Hospitalist, Family, RN, RT, Therapies, Pharmacy and Patient  The patient remains critically ill.  Critical care time = 38 minutes in directly providing and coordinating critical care and extensive data review.  No time overlap and  excludes procedures.

## 2019-03-03 NOTE — CARE PLAN
Problem: Respiratory:  Goal: Respiratory status will improve    Intervention: Administer and titrate oxygen therapy  Patient has increasing O2 demands from nasal cannula to mask      Problem: Fluid Volume:  Goal: Hemodynamic stability will improve    Intervention: Assess hemodynamic parameters  Patient has CVP monitoring, and titration of pressors for MAP of 65

## 2019-03-04 ENCOUNTER — APPOINTMENT (OUTPATIENT)
Dept: RADIOLOGY | Facility: MEDICAL CENTER | Age: 64
DRG: 871 | End: 2019-03-04
Attending: INTERNAL MEDICINE
Payer: MEDICARE

## 2019-03-04 ENCOUNTER — APPOINTMENT (OUTPATIENT)
Dept: RADIOLOGY | Facility: MEDICAL CENTER | Age: 64
DRG: 871 | End: 2019-03-04
Attending: HOSPITALIST
Payer: MEDICARE

## 2019-03-04 PROBLEM — I48.92 ATRIAL FIBRILLATION AND FLUTTER (HCC): Status: ACTIVE | Noted: 2019-03-04

## 2019-03-04 PROBLEM — I48.91 ATRIAL FIBRILLATION AND FLUTTER (HCC): Status: ACTIVE | Noted: 2019-03-04

## 2019-03-04 PROBLEM — E83.42 LOW SERUM MAGNESIUM LEVEL: Status: ACTIVE | Noted: 2019-03-04

## 2019-03-04 LAB
ANION GAP SERPL CALC-SCNC: 8 MMOL/L (ref 0–11.9)
BASOPHILS # BLD AUTO: 1.7 % (ref 0–1.8)
BASOPHILS # BLD: 0.13 K/UL (ref 0–0.12)
BUN SERPL-MCNC: 13 MG/DL (ref 8–22)
CALCIUM SERPL-MCNC: 7.4 MG/DL (ref 8.5–10.5)
CHLORIDE SERPL-SCNC: 107 MMOL/L (ref 96–112)
CO2 SERPL-SCNC: 28 MMOL/L (ref 20–33)
CREAT SERPL-MCNC: 0.69 MG/DL (ref 0.5–1.4)
EKG IMPRESSION: NORMAL
EKG IMPRESSION: NORMAL
EOSINOPHIL # BLD AUTO: 0.07 K/UL (ref 0–0.51)
EOSINOPHIL NFR BLD: 0.9 % (ref 0–6.9)
ERYTHROCYTE [DISTWIDTH] IN BLOOD BY AUTOMATED COUNT: 48.4 FL (ref 35.9–50)
GLUCOSE SERPL-MCNC: 165 MG/DL (ref 65–99)
HCT VFR BLD AUTO: 28.4 % (ref 37–47)
HGB BLD-MCNC: 9.2 G/DL (ref 12–16)
LYMPHOCYTES # BLD AUTO: 1.43 K/UL (ref 1–4.8)
LYMPHOCYTES NFR BLD: 19.1 % (ref 22–41)
MAGNESIUM SERPL-MCNC: 1.6 MG/DL (ref 1.5–2.5)
MANUAL DIFF BLD: NORMAL
MCH RBC QN AUTO: 28.9 PG (ref 27–33)
MCHC RBC AUTO-ENTMCNC: 32.4 G/DL (ref 33.6–35)
MCV RBC AUTO: 89.3 FL (ref 81.4–97.8)
MONOCYTES # BLD AUTO: 0.33 K/UL (ref 0–0.85)
MONOCYTES NFR BLD AUTO: 4.4 % (ref 0–13.4)
MORPHOLOGY BLD-IMP: NORMAL
MYELOCYTES NFR BLD MANUAL: 0.9 %
NEUTROPHILS # BLD AUTO: 5.48 K/UL (ref 2–7.15)
NEUTROPHILS NFR BLD: 70.4 % (ref 44–72)
NEUTS BAND NFR BLD MANUAL: 2.6 % (ref 0–10)
NRBC # BLD AUTO: 0 K/UL
NRBC BLD-RTO: 0 /100 WBC
PLATELET # BLD AUTO: 161 K/UL (ref 164–446)
PLATELET BLD QL SMEAR: NORMAL
PMV BLD AUTO: 9.5 FL (ref 9–12.9)
POTASSIUM SERPL-SCNC: 3.4 MMOL/L (ref 3.6–5.5)
RBC # BLD AUTO: 3.18 M/UL (ref 4.2–5.4)
RBC BLD AUTO: PRESENT
SODIUM SERPL-SCNC: 143 MMOL/L (ref 135–145)
TOXIC GRANULES BLD QL SMEAR: NORMAL
WBC # BLD AUTO: 7.5 K/UL (ref 4.8–10.8)

## 2019-03-04 PROCEDURE — 70551 MRI BRAIN STEM W/O DYE: CPT

## 2019-03-04 PROCEDURE — 700111 HCHG RX REV CODE 636 W/ 250 OVERRIDE (IP): Performed by: INTERNAL MEDICINE

## 2019-03-04 PROCEDURE — 82803 BLOOD GASES ANY COMBINATION: CPT

## 2019-03-04 PROCEDURE — 700105 HCHG RX REV CODE 258: Performed by: INTERNAL MEDICINE

## 2019-03-04 PROCEDURE — 700111 HCHG RX REV CODE 636 W/ 250 OVERRIDE (IP): Performed by: HOSPITALIST

## 2019-03-04 PROCEDURE — C9113 INJ PANTOPRAZOLE SODIUM, VIA: HCPCS | Performed by: HOSPITALIST

## 2019-03-04 PROCEDURE — 99291 CRITICAL CARE FIRST HOUR: CPT | Mod: 25 | Performed by: INTERNAL MEDICINE

## 2019-03-04 PROCEDURE — 82962 GLUCOSE BLOOD TEST: CPT

## 2019-03-04 PROCEDURE — 93010 ELECTROCARDIOGRAM REPORT: CPT | Mod: 76 | Performed by: INTERNAL MEDICINE

## 2019-03-04 PROCEDURE — 83735 ASSAY OF MAGNESIUM: CPT

## 2019-03-04 PROCEDURE — 93005 ELECTROCARDIOGRAM TRACING: CPT | Performed by: HOSPITALIST

## 2019-03-04 PROCEDURE — 85027 COMPLETE CBC AUTOMATED: CPT

## 2019-03-04 PROCEDURE — 80048 BASIC METABOLIC PNL TOTAL CA: CPT

## 2019-03-04 PROCEDURE — 36600 WITHDRAWAL OF ARTERIAL BLOOD: CPT

## 2019-03-04 PROCEDURE — 700101 HCHG RX REV CODE 250

## 2019-03-04 PROCEDURE — 85007 BL SMEAR W/DIFF WBC COUNT: CPT

## 2019-03-04 PROCEDURE — 93010 ELECTROCARDIOGRAM REPORT: CPT | Performed by: INTERNAL MEDICINE

## 2019-03-04 PROCEDURE — 93005 ELECTROCARDIOGRAM TRACING: CPT | Performed by: INTERNAL MEDICINE

## 2019-03-04 PROCEDURE — 770022 HCHG ROOM/CARE - ICU (200)

## 2019-03-04 PROCEDURE — 99233 SBSQ HOSP IP/OBS HIGH 50: CPT | Performed by: HOSPITALIST

## 2019-03-04 RX ORDER — AMIODARONE HYDROCHLORIDE 150 MG/3ML
150 INJECTION, SOLUTION INTRAVENOUS ONCE
Status: DISCONTINUED | OUTPATIENT
Start: 2019-03-04 | End: 2019-03-04

## 2019-03-04 RX ORDER — KETOROLAC TROMETHAMINE 30 MG/ML
15 INJECTION, SOLUTION INTRAMUSCULAR; INTRAVENOUS EVERY 6 HOURS PRN
Status: DISCONTINUED | OUTPATIENT
Start: 2019-03-04 | End: 2019-03-06

## 2019-03-04 RX ORDER — ACETAMINOPHEN 325 MG/1
650 TABLET ORAL EVERY 4 HOURS PRN
Status: DISCONTINUED | OUTPATIENT
Start: 2019-03-04 | End: 2019-03-12

## 2019-03-04 RX ORDER — POLYETHYLENE GLYCOL 3350 17 G/17G
1 POWDER, FOR SOLUTION ORAL
Status: DISCONTINUED | OUTPATIENT
Start: 2019-03-04 | End: 2019-03-05

## 2019-03-04 RX ORDER — TIZANIDINE 4 MG/1
2 TABLET ORAL EVERY 8 HOURS PRN
Status: DISCONTINUED | OUTPATIENT
Start: 2019-03-04 | End: 2019-03-06

## 2019-03-04 RX ORDER — GABAPENTIN 300 MG/1
300 CAPSULE ORAL 3 TIMES DAILY
Status: DISCONTINUED | OUTPATIENT
Start: 2019-03-04 | End: 2019-03-12

## 2019-03-04 RX ORDER — MAGNESIUM SULFATE HEPTAHYDRATE 40 MG/ML
2 INJECTION, SOLUTION INTRAVENOUS ONCE
Status: COMPLETED | OUTPATIENT
Start: 2019-03-04 | End: 2019-03-04

## 2019-03-04 RX ORDER — ASPIRIN 81 MG/1
324 TABLET, CHEWABLE ORAL DAILY
Status: DISCONTINUED | OUTPATIENT
Start: 2019-03-05 | End: 2019-03-07

## 2019-03-04 RX ORDER — METOPROLOL TARTRATE 1 MG/ML
5 INJECTION, SOLUTION INTRAVENOUS ONCE
Status: COMPLETED | OUTPATIENT
Start: 2019-03-04 | End: 2019-03-04

## 2019-03-04 RX ORDER — BISACODYL 10 MG
10 SUPPOSITORY, RECTAL RECTAL
Status: DISCONTINUED | OUTPATIENT
Start: 2019-03-04 | End: 2019-03-05

## 2019-03-04 RX ORDER — AMOXICILLIN 250 MG
2 CAPSULE ORAL 2 TIMES DAILY
Status: DISCONTINUED | OUTPATIENT
Start: 2019-03-04 | End: 2019-03-05

## 2019-03-04 RX ORDER — POTASSIUM CHLORIDE 14.9 MG/ML
20 INJECTION INTRAVENOUS ONCE
Status: COMPLETED | OUTPATIENT
Start: 2019-03-04 | End: 2019-03-04

## 2019-03-04 RX ORDER — ATORVASTATIN CALCIUM 40 MG/1
40 TABLET, FILM COATED ORAL EVERY EVENING
Status: DISCONTINUED | OUTPATIENT
Start: 2019-03-04 | End: 2019-03-12

## 2019-03-04 RX ORDER — METOPROLOL TARTRATE 1 MG/ML
INJECTION, SOLUTION INTRAVENOUS
Status: COMPLETED
Start: 2019-03-04 | End: 2019-03-04

## 2019-03-04 RX ORDER — LEVOTHYROXINE SODIUM 0.07 MG/1
150 TABLET ORAL
Status: DISCONTINUED | OUTPATIENT
Start: 2019-03-05 | End: 2019-03-12

## 2019-03-04 RX ORDER — AMITRIPTYLINE HYDROCHLORIDE 100 MG/1
300 TABLET ORAL NIGHTLY
Status: DISCONTINUED | OUTPATIENT
Start: 2019-03-04 | End: 2019-03-06

## 2019-03-04 RX ORDER — PANTOPRAZOLE SODIUM 40 MG/10ML
40 INJECTION, POWDER, LYOPHILIZED, FOR SOLUTION INTRAVENOUS ONCE
Status: COMPLETED | OUTPATIENT
Start: 2019-03-04 | End: 2019-03-04

## 2019-03-04 RX ADMIN — MORPHINE SULFATE 5 MG: 10 INJECTION INTRAVENOUS at 05:45

## 2019-03-04 RX ADMIN — MORPHINE SULFATE 3 MG: 10 INJECTION INTRAVENOUS at 12:33

## 2019-03-04 RX ADMIN — AMIODARONE HYDROCHLORIDE 150 MG: 1.5 INJECTION, SOLUTION INTRAVENOUS at 09:05

## 2019-03-04 RX ADMIN — AMIODARONE HYDROCHLORIDE 1 MG/MIN: 50 INJECTION, SOLUTION INTRAVENOUS at 09:48

## 2019-03-04 RX ADMIN — INSULIN HUMAN 1 UNITS: 100 INJECTION, SOLUTION PARENTERAL at 06:31

## 2019-03-04 RX ADMIN — KETOROLAC TROMETHAMINE 15 MG: 30 INJECTION, SOLUTION INTRAMUSCULAR at 11:52

## 2019-03-04 RX ADMIN — PIPERACILLIN SODIUM AND TAZOBACTAM SODIUM 3.38 G: 3; .375 INJECTION, POWDER, FOR SOLUTION INTRAVENOUS at 05:42

## 2019-03-04 RX ADMIN — MORPHINE SULFATE 5 MG: 10 INJECTION INTRAVENOUS at 02:19

## 2019-03-04 RX ADMIN — INSULIN HUMAN 1 UNITS: 100 INJECTION, SOLUTION PARENTERAL at 00:02

## 2019-03-04 RX ADMIN — SODIUM CHLORIDE, POTASSIUM CHLORIDE, SODIUM LACTATE AND CALCIUM CHLORIDE: 600; 310; 30; 20 INJECTION, SOLUTION INTRAVENOUS at 16:42

## 2019-03-04 RX ADMIN — HEPARIN SODIUM 5000 UNITS: 5000 INJECTION, SOLUTION INTRAVENOUS; SUBCUTANEOUS at 13:55

## 2019-03-04 RX ADMIN — INSULIN HUMAN 1 UNITS: 100 INJECTION, SOLUTION PARENTERAL at 11:59

## 2019-03-04 RX ADMIN — AMIODARONE HYDROCHLORIDE 150 MG: 1.5 INJECTION, SOLUTION INTRAVENOUS at 16:41

## 2019-03-04 RX ADMIN — HEPARIN SODIUM 5000 UNITS: 5000 INJECTION, SOLUTION INTRAVENOUS; SUBCUTANEOUS at 05:42

## 2019-03-04 RX ADMIN — SODIUM CHLORIDE, POTASSIUM CHLORIDE, SODIUM LACTATE AND CALCIUM CHLORIDE: 600; 310; 30; 20 INJECTION, SOLUTION INTRAVENOUS at 09:30

## 2019-03-04 RX ADMIN — CEFTRIAXONE SODIUM 2 G: 2 INJECTION, POWDER, FOR SOLUTION INTRAMUSCULAR; INTRAVENOUS at 10:24

## 2019-03-04 RX ADMIN — MORPHINE SULFATE 5 MG: 10 INJECTION INTRAVENOUS at 15:09

## 2019-03-04 RX ADMIN — POTASSIUM CHLORIDE 20 MEQ: 14.9 INJECTION, SOLUTION INTRAVENOUS at 08:16

## 2019-03-04 RX ADMIN — MAGNESIUM SULFATE HEPTAHYDRATE 2 G: 40 INJECTION, SOLUTION INTRAVENOUS at 08:13

## 2019-03-04 RX ADMIN — METOPROLOL TARTRATE 5 MG: 1 INJECTION, SOLUTION INTRAVENOUS at 16:11

## 2019-03-04 RX ADMIN — AMIODARONE HYDROCHLORIDE 0.5 MG/MIN: 50 INJECTION, SOLUTION INTRAVENOUS at 20:27

## 2019-03-04 RX ADMIN — PANTOPRAZOLE SODIUM 40 MG: 40 INJECTION, POWDER, LYOPHILIZED, FOR SOLUTION INTRAVENOUS at 16:11

## 2019-03-04 RX ADMIN — MORPHINE SULFATE 5 MG: 10 INJECTION INTRAVENOUS at 00:00

## 2019-03-04 RX ADMIN — MORPHINE SULFATE 5 MG: 10 INJECTION INTRAVENOUS at 22:35

## 2019-03-04 RX ADMIN — PHENYLEPHRINE HYDROCHLORIDE 50 MCG/MIN: 10 INJECTION INTRAVENOUS at 10:21

## 2019-03-04 NOTE — DIETARY
Nutrition Services: Update   Day 4 of admit.  Sonya Segovia is a 63 y.o. female with admitting DX of sepsis due to urinary tract infection.    Pt has been NPO/clears x 3 days in an ICU setting - inadequate nutrition. Discussed in rounds today regarding nutrition POC - deferred to Surgery at this time.  Pt is A&O to self, confused.  Per rounds, pt failed swallow evaluation.      -Per rounds, +abdominal pain, hypoactive BS.    -Westley-synephrine is currently sopped per MAR.     RD continues to monitor nutrition POC closely.     Malnutrition Risk: No risk identified at this time.     Recommendations/Plan:  1. Advance diet as medically feasible.  2. If diet is unable to advance, pt would benefit from nutrition support.   3. Monitor weight.    RD following

## 2019-03-04 NOTE — PROGRESS NOTES
0819: STAT ECG ordered for -160s.   0827: ECG at bedside. Dr. Ivey paged at 0835. HR sustaining 130-170s.  0837: Pt continuing to pull off oxymask despite reorientation and RN staying at bedside. Patient desatting to 82%, bilateral soft wrist restraints initiated, MD aware. Orders received for Amiodarone protocol, pharmacy notified.

## 2019-03-04 NOTE — PROGRESS NOTES
Dr. Shipley paged at 1321 regarding patient's -130s sustaining. Second page at 1340. Dr. Shipley at bedside at 1405. MD updated on patient's sister/son for wishes to hold on placing a CorTrak until results of MRI. Orders for second Amio bolus per Dr. Shipley.

## 2019-03-04 NOTE — CARE PLAN
Problem: Pain Management  Goal: Pain level will decrease to patient's comfort goal  Outcome: PROGRESSING SLOWER THAN EXPECTED  Pt's pain level will decrease to comfort with movement and comfort at rest, reported by pt  Intervention: Follow pain managment plan developed in collaboration with patient and Interdisciplinary Team  Pt will tolerate pain medication ordered, and report pain relief following administration  Intervention: Educate and implement non-pharmacologic comfort measures. Examples: relaxation, distration, play therapy, activity therapy, massage, etc.  Pt will report relief from pain d/t non-pharm measures, including: rest, repositioning, distraction, ice, emotional support

## 2019-03-04 NOTE — PROGRESS NOTES
Critical Care Progress Note    Date of admission  2/28/2019    Chief Complaint  63 y.o. female admitted 2/28/2019 with abdominal pain.    Hospital Course    This lady was admitted to the ICU with septic shock due to a UTI.      Interval Problem Update  Reviewed last 24 hour events:      Replete K and Mg  E coli in urine - shock  Oriented to self only  AF with RVR this am  Confused  Start amio  NE at 4  Failed swallow  LR 83  MRI pending  ASA and statin empirically for stroke  Change Zosyn to Rocephin      Review of Systems  Review of Systems   Unable to perform ROS: Acuity of condition        Vital Signs for last 24 hours   Temp:  [36.8 °C (98.2 °F)-37.7 °C (99.9 °F)] 37.3 °C (99.1 °F)  Pulse:  [] 101  Resp:  [10-53] 12  SpO2:  [85 %-96 %] 94 %    Hemodynamic parameters for last 24 hours  CVP:  [5 MM HG-260 MM HG] 5 MM HG    Respiratory Information for the last 24 hours       Physical Exam   Physical Exam   HENT:   Head: Normocephalic and atraumatic.   Right Ear: External ear normal.   Left Ear: External ear normal.   Nose: Nose normal.   Mouth/Throat: Oropharynx is clear and moist.   Eyes: Pupils are equal, round, and reactive to light. Conjunctivae are normal. Right eye exhibits no discharge. Left eye exhibits no discharge.   Neck: Normal range of motion. Neck supple. No JVD present. No tracheal deviation present.   Cardiovascular: Intact distal pulses.  Exam reveals no gallop.    Atrial fibrillation with rapid ventricular response   Pulmonary/Chest: No stridor. She has no wheezes. She has no rales.   Abdominal:   Obese.  Mildly tender.  No peritoneal signs.   Musculoskeletal: She exhibits no tenderness or deformity.   No clubbing or cyanosis   Neurological:   Confused.  Problems finding words.  Moves all 4 extremities.   Skin: Skin is warm and dry. She is not diaphoretic. No erythema.       Medications  Current Facility-Administered Medications   Medication Dose Route Frequency Provider Last Rate Last Dose    • lactated ringers infusion   Intravenous Continuous Ace Patiño M.D. 83 mL/hr at 03/03/19 0917     • omeprazole (PRILOSEC) capsule 20 mg  20 mg Oral DAILY Ace Patiño M.D.   Stopped at 03/03/19 1145   • piperacillin-tazobactam (ZOSYN) 3.375 g in  mL IVPB  3.375 g Intravenous Q8HRS Juan Maravilla M.D.   Stopped at 03/04/19 0108   • vasopressin (VASOSTRICT) 20 Units in  mL Infusion  0.03 Units/min Intravenous Continuous Ace Patiño M.D.   Stopped at 03/03/19 0852   • acetaminophen (TYLENOL) tablet 650 mg  650 mg Oral Q4HRS PRN Fabricio Shipley D.OAngela   650 mg at 03/02/19 1952   • aspirin (ASA) tablet 325 mg  325 mg Oral DAILY KWAKU Granger.OAngela   Stopped at 03/03/19 0513   • atorvastatin (LIPITOR) tablet 40 mg  40 mg Oral Q EVENING Fabricio Shipley D.O.   Stopped at 03/03/19 1800   • morphine (pf) 10 mg/mL injection 2-5 mg  2-5 mg Intravenous Q2HRS PRN MESERET GrangerOAngela   5 mg at 03/04/19 0219   • NS (BOLUS) infusion 500 mL  500 mL Intravenous Once PRN Kareem Solis M.D.       • norepinephrine (LEVOPHED) 8 mg in  mL Infusion  0-60 mcg/min Intravenous Continuous Juan Maravilla M.D. 13.1 mL/hr at 03/04/19 0302 7 mcg/min at 03/04/19 0302   • senna-docusate (PERICOLACE or SENOKOT S) 8.6-50 MG per tablet 2 Tab  2 Tab Oral BID Kareem Solis M.D.   Stopped at 03/01/19 0600    And   • polyethylene glycol/lytes (MIRALAX) PACKET 1 Packet  1 Packet Oral QDAY PRN Kareem Solis M.D.        And   • magnesium hydroxide (MILK OF MAGNESIA) suspension 30 mL  30 mL Oral QDAY PRN Kareem Solis M.D.        And   • bisacodyl (DULCOLAX) suppository 10 mg  10 mg Rectal QDAY PRN Kareem Solis M.D.   10 mg at 02/28/19 2221   • Respiratory Care per Protocol   Nebulization Continuous RT Kareem Solis M.D.       • heparin injection 5,000 Units  5,000 Units Subcutaneous Q8HRS Kareem Solis M.D.   5,000 Units at 03/03/19 2101   • ondansetron (ZOFRAN) syringe/vial injection 4 mg   4 mg Intravenous Q4HRS PRN Kareem Solis M.D.   4 mg at 03/01/19 0137   • ondansetron (ZOFRAN ODT) dispertab 4 mg  4 mg Oral Q4HRS PRN Kareem Solis M.D.       • promethazine (PHENERGAN) tablet 12.5-25 mg  12.5-25 mg Oral Q4HRS PRN Kareem Solis M.D.       • promethazine (PHENERGAN) suppository 12.5-25 mg  12.5-25 mg Rectal Q4HRS PRN Kareem Solis M.D.       • prochlorperazine (COMPAZINE) injection 5-10 mg  5-10 mg Intravenous Q4HRS PRN Kareem Solis M.D.       • amitriptyline (ELAVIL) tablet 300 mg  300 mg Oral Nightly Kareem Solis M.D.   Stopped at 03/03/19 2100   • FLUoxetine (PROZAC) capsule 40 mg  40 mg Oral DAILY Kareem Solis M.D.   Stopped at 03/03/19 0513   • gabapentin (NEURONTIN) capsule 600 mg  600 mg Oral TID Kareem Solis M.D.   Stopped at 03/03/19 0514   • levothyroxine (SYNTHROID) tablet 150 mcg  150 mcg Oral AM ES Kareem Solis M.D.   150 mcg at 03/03/19 0513   • tizanidine (ZANAFLEX) tablet 2 mg  2 mg Oral TID PRN Kareem Solis M.D.       • insulin regular (HUMULIN R) injection 1-6 Units  1-6 Units Subcutaneous Q6HRS Kareem Solis M.D.   1 Units at 03/04/19 0002    And   • glucose 4 g chewable tablet 16 g  16 g Oral Q15 MIN PRN Kareem Solis M.D.        And   • dextrose 50% (D50W) injection 25 mL  25 mL Intravenous Q15 MIN PRN Kareem Solis M.D.           Fluids    Intake/Output Summary (Last 24 hours) at 03/04/19 0401  Last data filed at 03/04/19 0200   Gross per 24 hour   Intake          1981.31 ml   Output             2555 ml   Net          -573.69 ml       Laboratory  Recent Labs      03/01/19   1512  03/03/19   0501   ISTATAPH  7.240*  7.438   ISTATAPCO2  21.2*  35.1   ISTATAPO2  59*  84   ISTATATCO2  10*  25   TZRQGQD2NDA  86*  97   ISTATARTHCO3  9.1*  23.8   ISTATARTBE  -16*  0   ISTATTEMP  98.5 F  95.6 F   ISTATFIO2  21  5   ISTATSPEC  Arterial  Arterial   ISTATAPHTC  7.241*  7.463   FLUJBTYT4AG  59*  75         Recent Labs       03/02/19   1520  03/03/19 0319 03/03/19   1841  03/04/19   0300   SODIUM  136  137   --   143   POTASSIUM  3.6  3.4*  3.6  3.4*   CHLORIDE  106  105   --   107   CO2  20  26   --   28   BUN  37*  29*   --   13   CREATININE  1.29  0.94   --   0.69   MAGNESIUM   --    --    --   1.6   CALCIUM  7.1*  6.3*   --   7.4*     Recent Labs      03/02/19 0328 03/02/19   1520  03/03/19 0319 03/04/19   0300   ALTSGPT  16   --    --    --    ASTSGOT  27   --    --    --    ALKPHOSPHAT  181*   --    --    --    TBILIRUBIN  0.5   --    --    --    LIPASE  19   --    --    --    GLUCOSE  217*  210*  195*  165*     Recent Labs      03/02/19 0328 03/03/19 0319 03/04/19   0300   WBC  12.2*  8.3  7.5   NEUTSPOLYS  61.70  53.00  70.40   LYMPHOCYTES  9.60*  21.00*  19.10*   MONOCYTES  4.30  7.00  4.40   EOSINOPHILS  0.00  3.00  0.90   BASOPHILS  0.90  0.00  1.70   ASTSGOT  27   --    --    ALTSGPT  16   --    --    ALKPHOSPHAT  181*   --    --    TBILIRUBIN  0.5   --    --      Recent Labs      03/02/19 0328 03/03/19 0319 03/04/19   0300   RBC  4.03*  3.48*  3.18*   HEMOGLOBIN  11.5*  10.0*  9.2*   HEMATOCRIT  36.0*  30.9*  28.4*   PLATELETCT  174  157*  161*       Imaging  X-Ray:  I have personally reviewed the images and compared with prior images. and My impression is: CXR with clear lungs  MRI:   Reviewed    Assessment/Plan  * Septic shock (HCC)   Assessment & Plan    Severe sepsis with associated acute encephalopathy and acute kidney injury  Urinary source  Continue antibiotics  Titrating vasopressors to maintain mean arterial pressure greater than 65     Atrial fibrillation and flutter (HCC)   Assessment & Plan    New onset atrial flutter/fibrillation  TSH normal  Echocardiogram unremarkable  Optimize potassium magnesium  Loaded with amiodarone and start amiodarone drip     Urinary tract infection   Assessment & Plan    Urine culture with E. coli  Change Zosyn to Rocephin     Acute encephalopathy   Assessment &  Plan    Suspect due to sepsis and metabolic  MRI without evidence of acute stroke  Check ammonia level  Minimize sedatives     JOSE (acute kidney injury) ([facility])   Assessment & Plan    Resolved  Monitor renal function  Avoid nephrotoxins     DM (diabetes mellitus) ([facility])   Assessment & Plan    Continue sliding scale insulin     Depression   Assessment & Plan    Continue amitriptyline, 300 mg nightly     Hypomagnesemia   Assessment & Plan    Replete magnesium     Hypokalemia   Assessment & Plan    Replete potassium     CN (constipation)   Assessment & Plan    Continue bowel regimen          VTE:  Lovenox  Ulcer: Not Indicated  Lines: Central Line  Ongoing indication addressed and [facility] Catheter  Ongoing indication addressed    I have performed a physical exam and reviewed and updated ROS and Plan today (3/4/2019). In review of yesterday's note (3/3/2019), there are no changes except as documented above.     I have assessed and reassessed her respiratory status, blood pressure, hemodynamics, cardiovascular status with titration of norepinephrine and neurologic status.  She is at increased risk for worsening respiratory, cardiovascular and CNS system dysfunction.  Critically ill in ICU with septic shock requiring vasopressor support.    Discussed patient condition and risk of morbidity and/or mortality with Hospitalist, RN, RT, Pharmacy, Charge nurse / hot rounds and QA team     The patient remains critically ill.  Critical care time = 35 minutes minutes in directly providing and coordinating critical care and extensive data review.  No time overlap and excludes procedures.    Fabricio Ivey MD  Pulmonary and Critical Care Medicine

## 2019-03-04 NOTE — THERAPY
Per discussion with OT, per RN hold eval as pt is tachycardic at rest and is requiring restraints due to confusion. Will attempt eval as able and appropriate.

## 2019-03-04 NOTE — CARE PLAN
Problem: Nutritional:  Goal: Achieve adequate nutritional intake  Advance diet as feasible and patient will consume >50% of meals versus initiation of nutrition support.   Outcome: NOT MET

## 2019-03-04 NOTE — PROGRESS NOTES
2 RN skin check completed, no new areas of concern noted, interventions in place and all previous areas identified are charted on.

## 2019-03-04 NOTE — PROGRESS NOTES
Pharmacy notified regarding possible order for Toradol to help with patient's pain. PharmD to speak with Dr. Shipley for order.

## 2019-03-04 NOTE — PROGRESS NOTES
[facility] Medicine Daily Progress Note    Date of Service  3/4/2019    Chief Complaint  Abdominal pain    Hospital Course    63 y.o. obese female with hypertension, hypothyroid, diabetes, depression admitted 2/28/2019 with sepsis due to UTI.  She initially presented with complaints of nausea, vomiting and concerns of multiple syncopal episodes on day of admission.  Her blood pressure was 50 upon emergency medical staff response at her home.  In the emergency room she was hypotensive with systolics in the 70s.  CT scan was performed in the emergency room with concerns of constipation but negative for any aneurysm or dissection on CTA.      Interval Problem Update  Oriented to self only  Pulls off her oxymask frequently  Got morphine prn overnight  Afib with RVR last night and amiodarone initated  On levophed  Failed swallow evaluation    Consultants/Specialty  Pulmonary/Intensivist    Code Status  FULL    Disposition  Monitor in ICU    Review of Systems  Review of Systems   Unable to perform ROS: Mental acuity        Physical Exam  Temp:  [36.6 °C (97.9 °F)-37.7 °C (99.9 °F)] 37.7 °C (99.9 °F)  Pulse:  [] 124  Resp:  [12-53] 31  SpO2:  [85 %-95 %] 92 %    Physical Exam   Constitutional: She appears well-developed. She appears lethargic. She appears ill. No distress.   Obese   HENT:   Head: Normocephalic and atraumatic.   Nose: Nose normal.   Mouth/Throat: Oropharyngeal exudate (question of thrush) present.   Eyes: Conjunctivae and EOM are normal. Right eye exhibits no discharge. Left eye exhibits no discharge. No scleral icterus.   Neck: Neck supple. No tracheal deviation present.   Cardiovascular: Normal rate, regular rhythm and normal heart sounds.    No murmur heard.  Pulses:       Radial pulses are 2+ on the right side, and 2+ on the left side.        Dorsalis pedis pulses are 2+ on the right side, and 2+ on the left side.   Pulmonary/Chest: Effort normal and breath sounds normal. No respiratory distress.  She has no wheezes. She has no rales.   Abdominal: Soft. Bowel sounds are normal. She exhibits no distension. There is tenderness (bilateral flanks).   Musculoskeletal: She exhibits no edema.   Neurological: She appears lethargic. She is disoriented. No cranial nerve deficit.   Skin: Skin is warm. She is not diaphoretic.   Psychiatric: Her speech is delayed. She is slowed. Cognition and memory are impaired.   Vitals reviewed.      Fluids    Intake/Output Summary (Last 24 hours) at 03/04/19 1615  Last data filed at 03/04/19 1600   Gross per 24 hour   Intake          3142.02 ml   Output             2750 ml   Net           392.02 ml       Laboratory  Recent Labs      03/02/19   0328  03/03/19   0319  03/04/19   0300   WBC  12.2*  8.3  7.5   RBC  4.03*  3.48*  3.18*   HEMOGLOBIN  11.5*  10.0*  9.2*   HEMATOCRIT  36.0*  30.9*  28.4*   MCV  89.3  88.8  89.3   MCH  28.5  28.7  28.9   MCHC  31.9*  32.4*  32.4*   RDW  46.9  47.2  48.4   PLATELETCT  174  157*  161*   MPV  9.6  10.3  9.5     Recent Labs      03/02/19   1520  03/03/19   0319  03/03/19   1841  03/04/19   0300   SODIUM  136  137   --   143   POTASSIUM  3.6  3.4*  3.6  3.4*   CHLORIDE  106  105   --   107   CO2  20  26   --   28   GLUCOSE  210*  195*   --   165*   BUN  37*  29*   --   13   CREATININE  1.29  0.94   --   0.69   CALCIUM  7.1*  6.3*   --   7.4*                   Imaging  MR-BRAIN-W/O   Final Result      1.  No acute abnormality.   2.  The flow voids of the cerebral vessels particularly left M1 segment is patent.The hyperdensity on the recent CT scan likely represent artifact.   3.  Mild cerebral atrophy.      EC-ECHOCARDIOGRAM COMPLETE W/ CONT   Final Result      CT-HEAD W/O   Final Result   Addendum 1 of 1   Call report initiated at 1:40 PM on 3/1/2019.      These findings were discussed with LARRY WISEMAN on 3/1/2019 2:24 PM.      Final      1.  Subtle increased density of LEFT middle cerebral artery and sylvian branches possibly indicating slow  flow or occlusion and potential early sign of acute stroke.   2.  No intracranial hemorrhage.   3.  Mild atrophy.      CT-ABDOMEN-PELVIS W/O   Final Result      Decrease in volume of colonic stool with no abnormal bowel dilatation detected      New third spacing with some new mild abdominopelvic ascites. No free air is seen to indicate extravasation. No loculated fluid collection to suggest abscess is noted      No pneumatosis detected to suggest bowel ischemia         DX-ABDOMEN FOR TUBE PLACEMENT   Final Result      Enteric tube terminates over the gastroduodenal junction.      DX-CHEST-LIMITED (1 VIEW)   Final Result      New right IJ line tip overlies the SVC      No radiographic evidence of acute cardiopulmonary process.      DX-CHEST-PORTABLE (1 VIEW)   Final Result      No acute cardiopulmonary abnormality.      CT-CTA COMPLETE THORACOABDOMINAL AORTA   Final Result      1. No evidence of aortic dissection or aneurysm. No high-grade stenosis or occlusion of major pelvic branches vessels.   2. No pulmonary embolus.   3. Moderate to large amount of stool throughout the colon.                 Assessment/Plan  * Septic shock (HCC)   Assessment & Plan    This is severe sepsis with the following associated acute organ dysfunction(s): acute kidney failure.   IV fluids  Monitor CVP  3/1 cortisol level:37 and TSH: 1.1  Strict I's and O's  Pressor support to keep map greater than 65 and systolic blood pressure greater than 90  Sepsis protocol  Antibiotics  Suspect UTI/pyelonephritis.    Urine culture growing Ecoli  CT Abd w/o clear source  Monitor labs, vitals     Atrial fibrillation and flutter (HCC)   Assessment & Plan    EKG showing question of Aflutter  Was on amiodarone earlier in day 3/4am.  Giving metoprolol 5mg IV once to see if rhythm pronounces itself better.  May need further amiodarone     Acute encephalopathy   Assessment & Plan    Normal TSH  Likely sepsis from UTI treat with abx  Abnormal CT Head.   Unfortunately unable to perform CTA head and neck secondary to renal function.   MRI Brain unremarkable for acute findings  Monitor neurochecks  Delerium from infection?    If ongong may consult neurology     Lactic acidosis   Assessment & Plan    Improved CO2  Continue IV fluids     JOSE (acute kidney injury) ([facility])   Assessment & Plan    Concern of prerenal state.  Giving IV fluids  Monitor I's and O's  3/2 BUN:37, Cr:1.29  3/1/19 BUN: 43 creatinine: 1.83  3/4 BUN:13, Cr: 0.69  Avoid nephrotoxins     Urinary tract infection   Assessment & Plan    Follow urine culture (as of 3/2 growing LF GNR)  Monitor labs  IV antibiotics  IV fluids  Monitor labs, vitals,   3/4 WBC: 7.5  3/3 WBC: 8.3  3/2 WBC:12.2  3/1 WBC:16.8  2/28 WBC:13.9     Hypotension   Assessment & Plan    Pressor support to keep MAP >65 and SBP >90  Monitor I/O's, vitals, labs     Low serum magnesium level   Assessment & Plan    Replace and monitor     DM (diabetes mellitus) ([facility])   Assessment & Plan    Monitor Accu-Cheks covering sliding scale insulin  Outpatient she was on metformin 1000 mg twice daily.  Metformin currently being held  3/3 A1c: 6.9     Depression   Assessment & Plan    Continue Elavil and Prozac     Hypokalemia   Assessment & Plan    3/4 K:3.4  Supplement and check tomorrow labs     Hypocalcemia   Assessment & Plan    3/3 Corrected Calcium for albumin: 7.4  Calcium supplemented 3/3  Monitor     Class 2 severe obesity due to excess calories with serious comorbidity and body mass index (BMI) of 37.0 to 37.9 in adult ([facility])   Assessment & Plan    Body mass index is 39.52 kg/m².  Will need further encouragement and education on lifestyle modifications and dietary changes     CN (constipation)   Assessment & Plan    IV fluids  Bowel protocol  Required a BMS system 3/1     Abnormal LFTs   Assessment & Plan    Obese concern of hepatic congestion from possible right-sided heart failure.  Monitor CMP  3/1/19 CT abdomen/pelvis:   Decrease in volume  of colonic stool with no abnormal bowel dilatation detected   New third spacing with some new mild abdominopelvic ascites. No free air is seen to indicate extravasation. No loculated fluid collection to suggest  abscess is noted   No pneumatosis detected to suggest bowel ischemia  2/28/19 CT aortogram without acute dissection or aneurysm          VTE prophylaxis: heparin

## 2019-03-04 NOTE — THERAPY
2nd attempt at OT eval. Pt is tachycardic at rest (120-130), requiring restraints due to confusion. Not appropriate for OT eval at this time. Will continue to monitor and complete eval as indicated.

## 2019-03-04 NOTE — CARE PLAN
Problem: Safety  Goal: Free from accidental injury    Intervention: Initiate Safety Measures  Bed in low, locked position, near nursing station, call light within reach. Bed alarm activated, appropriate fall identifiers in place.      Problem: Risk for Deep Vein Thrombosis/Venous Thromboembolism  Goal: DVT/VTE Prevention Measures in Place    Intervention: Intermittent sequential compression device in place 23 hours per day  SCDs in place at least 23 hours per day to reduce risk of lower extremity DVT.

## 2019-03-05 ENCOUNTER — APPOINTMENT (OUTPATIENT)
Dept: RADIOLOGY | Facility: MEDICAL CENTER | Age: 64
DRG: 871 | End: 2019-03-05
Attending: INTERNAL MEDICINE
Payer: MEDICARE

## 2019-03-05 PROBLEM — J98.11 ATELECTASIS: Status: ACTIVE | Noted: 2019-03-05

## 2019-03-05 PROBLEM — J96.01 ACUTE HYPOXEMIC RESPIRATORY FAILURE (HCC): Status: ACTIVE | Noted: 2019-03-05

## 2019-03-05 LAB
ACTION RANGE TRIGGERED IACRT: NO
ALBUMIN SERPL BCP-MCNC: 2.7 G/DL (ref 3.2–4.9)
ALBUMIN/GLOB SERPL: 0.8 G/DL
ALP SERPL-CCNC: 90 U/L (ref 30–99)
ALT SERPL-CCNC: 11 U/L (ref 2–50)
AMMONIA PLAS-SCNC: 30 UMOL/L (ref 11–45)
ANION GAP SERPL CALC-SCNC: 11 MMOL/L (ref 0–11.9)
ANION GAP SERPL CALC-SCNC: 8 MMOL/L (ref 0–11.9)
ANISOCYTOSIS BLD QL SMEAR: ABNORMAL
ANISOCYTOSIS BLD QL SMEAR: ABNORMAL
APPEARANCE UR: ABNORMAL
AST SERPL-CCNC: 27 U/L (ref 12–45)
BACTERIA #/AREA URNS HPF: NEGATIVE /HPF
BACTERIA BLD CULT: NORMAL
BASE EXCESS BLDA CALC-SCNC: 0 MMOL/L (ref -4–3)
BASOPHILS # BLD AUTO: 0 % (ref 0–1.8)
BASOPHILS # BLD AUTO: 0.9 % (ref 0–1.8)
BASOPHILS # BLD: 0 K/UL (ref 0–0.12)
BASOPHILS # BLD: 0.05 K/UL (ref 0–0.12)
BILIRUB SERPL-MCNC: 0.7 MG/DL (ref 0.1–1.5)
BILIRUB UR QL STRIP.AUTO: ABNORMAL
BODY TEMPERATURE: ABNORMAL DEGREES
BUN SERPL-MCNC: 14 MG/DL (ref 8–22)
BUN SERPL-MCNC: 15 MG/DL (ref 8–22)
CALCIUM SERPL-MCNC: 7.7 MG/DL (ref 8.5–10.5)
CALCIUM SERPL-MCNC: 7.8 MG/DL (ref 8.5–10.5)
CHLORIDE SERPL-SCNC: 105 MMOL/L (ref 96–112)
CHLORIDE SERPL-SCNC: 109 MMOL/L (ref 96–112)
CO2 BLDA-SCNC: 26 MMOL/L (ref 20–33)
CO2 SERPL-SCNC: 25 MMOL/L (ref 20–33)
CO2 SERPL-SCNC: 28 MMOL/L (ref 20–33)
COLOR UR: ABNORMAL
CREAT SERPL-MCNC: 0.75 MG/DL (ref 0.5–1.4)
CREAT SERPL-MCNC: 0.79 MG/DL (ref 0.5–1.4)
EKG IMPRESSION: NORMAL
EOSINOPHIL # BLD AUTO: 0 K/UL (ref 0–0.51)
EOSINOPHIL # BLD AUTO: 0.13 K/UL (ref 0–0.51)
EOSINOPHIL NFR BLD: 0 % (ref 0–6.9)
EOSINOPHIL NFR BLD: 2.6 % (ref 0–6.9)
EPI CELLS #/AREA URNS HPF: ABNORMAL /HPF
ERYTHROCYTE [DISTWIDTH] IN BLOOD BY AUTOMATED COUNT: 47.9 FL (ref 35.9–50)
ERYTHROCYTE [DISTWIDTH] IN BLOOD BY AUTOMATED COUNT: 47.9 FL (ref 35.9–50)
GLOBULIN SER CALC-MCNC: 3.6 G/DL (ref 1.9–3.5)
GLUCOSE BLD-MCNC: 143 MG/DL (ref 65–99)
GLUCOSE BLD-MCNC: 149 MG/DL (ref 65–99)
GLUCOSE BLD-MCNC: 152 MG/DL (ref 65–99)
GLUCOSE BLD-MCNC: 161 MG/DL (ref 65–99)
GLUCOSE BLD-MCNC: 165 MG/DL (ref 65–99)
GLUCOSE BLD-MCNC: 170 MG/DL (ref 65–99)
GLUCOSE BLD-MCNC: 186 MG/DL (ref 65–99)
GLUCOSE BLD-MCNC: 197 MG/DL (ref 65–99)
GLUCOSE BLD-MCNC: 217 MG/DL (ref 65–99)
GLUCOSE SERPL-MCNC: 184 MG/DL (ref 65–99)
GLUCOSE SERPL-MCNC: 222 MG/DL (ref 65–99)
GLUCOSE UR STRIP.AUTO-MCNC: NEGATIVE MG/DL
GRAN CASTS #/AREA URNS LPF: ABNORMAL /LPF
HCO3 BLDA-SCNC: 25.2 MMOL/L (ref 17–25)
HCT VFR BLD AUTO: 30.3 % (ref 37–47)
HCT VFR BLD AUTO: 34.3 % (ref 37–47)
HGB BLD-MCNC: 11 G/DL (ref 12–16)
HGB BLD-MCNC: 9.8 G/DL (ref 12–16)
HOROWITZ INDEX BLDA+IHG-RTO: 129 MM[HG]
HYALINE CASTS #/AREA URNS LPF: ABNORMAL /LPF
INST. QUALIFIED PATIENT IIQPT: YES
KETONES UR STRIP.AUTO-MCNC: 15 MG/DL
LACTATE BLD-SCNC: 2.8 MMOL/L (ref 0.5–2)
LEUKOCYTE ESTERASE UR QL STRIP.AUTO: ABNORMAL
LYMPHOCYTES # BLD AUTO: 1.05 K/UL (ref 1–4.8)
LYMPHOCYTES # BLD AUTO: 2.15 K/UL (ref 1–4.8)
LYMPHOCYTES NFR BLD: 20.9 % (ref 22–41)
LYMPHOCYTES NFR BLD: 26.5 % (ref 22–41)
MACROCYTES BLD QL SMEAR: ABNORMAL
MACROCYTES BLD QL SMEAR: ABNORMAL
MAGNESIUM SERPL-MCNC: 1.8 MG/DL (ref 1.5–2.5)
MANUAL DIFF BLD: NORMAL
MANUAL DIFF BLD: NORMAL
MCH RBC QN AUTO: 28.4 PG (ref 27–33)
MCH RBC QN AUTO: 28.9 PG (ref 27–33)
MCHC RBC AUTO-ENTMCNC: 32.1 G/DL (ref 33.6–35)
MCHC RBC AUTO-ENTMCNC: 32.3 G/DL (ref 33.6–35)
MCV RBC AUTO: 88.6 FL (ref 81.4–97.8)
MCV RBC AUTO: 89.4 FL (ref 81.4–97.8)
METAMYELOCYTES NFR BLD MANUAL: 0.9 %
METAMYELOCYTES NFR BLD MANUAL: 1.7 %
MICRO URNS: ABNORMAL
MONOCYTES # BLD AUTO: 0.18 K/UL (ref 0–0.85)
MONOCYTES # BLD AUTO: 0.36 K/UL (ref 0–0.85)
MONOCYTES NFR BLD AUTO: 3.5 % (ref 0–13.4)
MONOCYTES NFR BLD AUTO: 4.4 % (ref 0–13.4)
MORPHOLOGY BLD-IMP: NORMAL
MORPHOLOGY BLD-IMP: NORMAL
NEUTROPHILS # BLD AUTO: 3.52 K/UL (ref 2–7.15)
NEUTROPHILS # BLD AUTO: 5.52 K/UL (ref 2–7.15)
NEUTROPHILS NFR BLD: 61.1 % (ref 44–72)
NEUTROPHILS NFR BLD: 70.4 % (ref 44–72)
NEUTS BAND NFR BLD MANUAL: 7.1 % (ref 0–10)
NITRITE UR QL STRIP.AUTO: NEGATIVE
NRBC # BLD AUTO: 0 K/UL
NRBC # BLD AUTO: 0.05 K/UL
NRBC BLD-RTO: 0 /100 WBC
NRBC BLD-RTO: 0.6 /100 WBC
O2/TOTAL GAS SETTING VFR VENT: 52 %
PCO2 BLDA: 40.7 MMHG (ref 26–37)
PCO2 TEMP ADJ BLDA: 41.3 MMHG (ref 26–37)
PH BLDA: 7.4 [PH] (ref 7.4–7.5)
PH TEMP ADJ BLDA: 7.39 [PH] (ref 7.4–7.5)
PH UR STRIP.AUTO: 5.5 [PH]
PLATELET # BLD AUTO: 180 K/UL (ref 164–446)
PLATELET # BLD AUTO: 296 K/UL (ref 164–446)
PLATELET BLD QL SMEAR: NORMAL
PLATELET BLD QL SMEAR: NORMAL
PMV BLD AUTO: 9.6 FL (ref 9–12.9)
PMV BLD AUTO: 9.9 FL (ref 9–12.9)
PO2 BLDA: 67 MMHG (ref 64–87)
PO2 TEMP ADJ BLDA: 69 MMHG (ref 64–87)
POLYCHROMASIA BLD QL SMEAR: NORMAL
POTASSIUM SERPL-SCNC: 3.2 MMOL/L (ref 3.6–5.5)
POTASSIUM SERPL-SCNC: 4 MMOL/L (ref 3.6–5.5)
POTASSIUM SERPL-SCNC: 4.3 MMOL/L (ref 3.6–5.5)
PROT SERPL-MCNC: 6.3 G/DL (ref 6–8.2)
PROT UR QL STRIP: 300 MG/DL
RBC # BLD AUTO: 3.39 M/UL (ref 4.2–5.4)
RBC # BLD AUTO: 3.87 M/UL (ref 4.2–5.4)
RBC # URNS HPF: ABNORMAL /HPF
RBC BLD AUTO: PRESENT
RBC BLD AUTO: PRESENT
RBC UR QL AUTO: ABNORMAL
SAO2 % BLDA: 93 % (ref 93–99)
SIGNIFICANT IND 70042: NORMAL
SITE SITE: NORMAL
SODIUM SERPL-SCNC: 141 MMOL/L (ref 135–145)
SODIUM SERPL-SCNC: 145 MMOL/L (ref 135–145)
SOURCE SOURCE: NORMAL
SP GR UR STRIP.AUTO: 1.03
SPECIMEN DRAWN FROM PATIENT: ABNORMAL
TRIGL SERPL-MCNC: 294 MG/DL (ref 0–149)
TROPONIN I SERPL-MCNC: 0.01 NG/ML (ref 0–0.04)
TROPONIN I SERPL-MCNC: 0.01 NG/ML (ref 0–0.04)
UROBILINOGEN UR STRIP.AUTO-MCNC: 0.2 MG/DL
VARIANT LYMPHS BLD QL SMEAR: NORMAL
WBC # BLD AUTO: 5 K/UL (ref 4.8–10.8)
WBC # BLD AUTO: 8.1 K/UL (ref 4.8–10.8)
WBC #/AREA URNS HPF: ABNORMAL /HPF

## 2019-03-05 PROCEDURE — 31500 INSERT EMERGENCY AIRWAY: CPT

## 2019-03-05 PROCEDURE — 31645 BRNCHSC W/THER ASPIR 1ST: CPT | Performed by: INTERNAL MEDICINE

## 2019-03-05 PROCEDURE — 700102 HCHG RX REV CODE 250 W/ 637 OVERRIDE(OP): Performed by: INTERNAL MEDICINE

## 2019-03-05 PROCEDURE — 71045 X-RAY EXAM CHEST 1 VIEW: CPT

## 2019-03-05 PROCEDURE — 31624 DX BRONCHOSCOPE/LAVAGE: CPT | Performed by: INTERNAL MEDICINE

## 2019-03-05 PROCEDURE — 5A1945Z RESPIRATORY VENTILATION, 24-96 CONSECUTIVE HOURS: ICD-10-PCS | Performed by: INTERNAL MEDICINE

## 2019-03-05 PROCEDURE — A9270 NON-COVERED ITEM OR SERVICE: HCPCS | Performed by: HOSPITALIST

## 2019-03-05 PROCEDURE — 93010 ELECTROCARDIOGRAM REPORT: CPT | Performed by: INTERNAL MEDICINE

## 2019-03-05 PROCEDURE — 87205 SMEAR GRAM STAIN: CPT

## 2019-03-05 PROCEDURE — 74018 RADEX ABDOMEN 1 VIEW: CPT

## 2019-03-05 PROCEDURE — 302978 HCHG BRONCHOSCOPY-DIAGNOSTIC

## 2019-03-05 PROCEDURE — 93005 ELECTROCARDIOGRAM TRACING: CPT | Performed by: INTERNAL MEDICINE

## 2019-03-05 PROCEDURE — 84132 ASSAY OF SERUM POTASSIUM: CPT

## 2019-03-05 PROCEDURE — 94002 VENT MGMT INPAT INIT DAY: CPT

## 2019-03-05 PROCEDURE — 700105 HCHG RX REV CODE 258: Performed by: INTERNAL MEDICINE

## 2019-03-05 PROCEDURE — 700102 HCHG RX REV CODE 250 W/ 637 OVERRIDE(OP): Performed by: HOSPITALIST

## 2019-03-05 PROCEDURE — 80048 BASIC METABOLIC PNL TOTAL CA: CPT

## 2019-03-05 PROCEDURE — 99291 CRITICAL CARE FIRST HOUR: CPT | Mod: 25 | Performed by: INTERNAL MEDICINE

## 2019-03-05 PROCEDURE — 81001 URINALYSIS AUTO W/SCOPE: CPT

## 2019-03-05 PROCEDURE — 84484 ASSAY OF TROPONIN QUANT: CPT | Mod: 91

## 2019-03-05 PROCEDURE — 82140 ASSAY OF AMMONIA: CPT

## 2019-03-05 PROCEDURE — 700111 HCHG RX REV CODE 636 W/ 250 OVERRIDE (IP): Performed by: INTERNAL MEDICINE

## 2019-03-05 PROCEDURE — 302214 INTUBATION BOX: Performed by: INTERNAL MEDICINE

## 2019-03-05 PROCEDURE — 87102 FUNGUS ISOLATION CULTURE: CPT

## 2019-03-05 PROCEDURE — 87015 SPECIMEN INFECT AGNT CONCNTJ: CPT

## 2019-03-05 PROCEDURE — 770022 HCHG ROOM/CARE - ICU (200)

## 2019-03-05 PROCEDURE — 99233 SBSQ HOSP IP/OBS HIGH 50: CPT | Performed by: HOSPITALIST

## 2019-03-05 PROCEDURE — 92950 HEART/LUNG RESUSCITATION CPR: CPT

## 2019-03-05 PROCEDURE — 87116 MYCOBACTERIA CULTURE: CPT

## 2019-03-05 PROCEDURE — 0B968ZZ DRAINAGE OF RIGHT LOWER LOBE BRONCHUS, VIA NATURAL OR ARTIFICIAL OPENING ENDOSCOPIC: ICD-10-PCS | Performed by: INTERNAL MEDICINE

## 2019-03-05 PROCEDURE — 83605 ASSAY OF LACTIC ACID: CPT

## 2019-03-05 PROCEDURE — 84478 ASSAY OF TRIGLYCERIDES: CPT

## 2019-03-05 PROCEDURE — 83735 ASSAY OF MAGNESIUM: CPT

## 2019-03-05 PROCEDURE — 700111 HCHG RX REV CODE 636 W/ 250 OVERRIDE (IP)

## 2019-03-05 PROCEDURE — 85027 COMPLETE CBC AUTOMATED: CPT

## 2019-03-05 PROCEDURE — 88112 CYTOPATH CELL ENHANCE TECH: CPT

## 2019-03-05 PROCEDURE — 0B9B8ZZ DRAINAGE OF LEFT LOWER LOBE BRONCHUS, VIA NATURAL OR ARTIFICIAL OPENING ENDOSCOPIC: ICD-10-PCS | Performed by: INTERNAL MEDICINE

## 2019-03-05 PROCEDURE — 88305 TISSUE EXAM BY PATHOLOGIST: CPT

## 2019-03-05 PROCEDURE — 0BH17EZ INSERTION OF ENDOTRACHEAL AIRWAY INTO TRACHEA, VIA NATURAL OR ARTIFICIAL OPENING: ICD-10-PCS | Performed by: INTERNAL MEDICINE

## 2019-03-05 PROCEDURE — 87040 BLOOD CULTURE FOR BACTERIA: CPT | Mod: 91

## 2019-03-05 PROCEDURE — 82962 GLUCOSE BLOOD TEST: CPT | Mod: 91

## 2019-03-05 PROCEDURE — 0B9F8ZX DRAINAGE OF RIGHT LOWER LUNG LOBE, VIA NATURAL OR ARTIFICIAL OPENING ENDOSCOPIC, DIAGNOSTIC: ICD-10-PCS | Performed by: INTERNAL MEDICINE

## 2019-03-05 PROCEDURE — 700111 HCHG RX REV CODE 636 W/ 250 OVERRIDE (IP): Performed by: HOSPITALIST

## 2019-03-05 PROCEDURE — 80053 COMPREHEN METABOLIC PANEL: CPT

## 2019-03-05 PROCEDURE — 87206 SMEAR FLUORESCENT/ACID STAI: CPT

## 2019-03-05 PROCEDURE — 85007 BL SMEAR W/DIFF WBC COUNT: CPT | Mod: 91

## 2019-03-05 PROCEDURE — 302128 INFUSION PUMP: Performed by: HOSPITALIST

## 2019-03-05 PROCEDURE — 87070 CULTURE OTHR SPECIMN AEROBIC: CPT

## 2019-03-05 PROCEDURE — A9270 NON-COVERED ITEM OR SERVICE: HCPCS | Performed by: INTERNAL MEDICINE

## 2019-03-05 PROCEDURE — 31500 INSERT EMERGENCY AIRWAY: CPT | Performed by: INTERNAL MEDICINE

## 2019-03-05 RX ORDER — BISACODYL 10 MG
10 SUPPOSITORY, RECTAL RECTAL
Status: DISCONTINUED | OUTPATIENT
Start: 2019-03-05 | End: 2019-03-06

## 2019-03-05 RX ORDER — AMOXICILLIN 250 MG
2 CAPSULE ORAL 2 TIMES DAILY
Status: DISCONTINUED | OUTPATIENT
Start: 2019-03-05 | End: 2019-03-06

## 2019-03-05 RX ORDER — POLYETHYLENE GLYCOL 3350 17 G/17G
1 POWDER, FOR SOLUTION ORAL
Status: DISCONTINUED | OUTPATIENT
Start: 2019-03-05 | End: 2019-03-06

## 2019-03-05 RX ORDER — MAGNESIUM SULFATE HEPTAHYDRATE 40 MG/ML
2 INJECTION, SOLUTION INTRAVENOUS ONCE
Status: COMPLETED | OUTPATIENT
Start: 2019-03-05 | End: 2019-03-05

## 2019-03-05 RX ORDER — ETOMIDATE 2 MG/ML
20 INJECTION INTRAVENOUS ONCE
Status: COMPLETED | OUTPATIENT
Start: 2019-03-05 | End: 2019-03-05

## 2019-03-05 RX ORDER — IPRATROPIUM BROMIDE AND ALBUTEROL SULFATE 2.5; .5 MG/3ML; MG/3ML
3 SOLUTION RESPIRATORY (INHALATION)
Status: DISCONTINUED | OUTPATIENT
Start: 2019-03-05 | End: 2019-03-15 | Stop reason: HOSPADM

## 2019-03-05 RX ORDER — SODIUM CHLORIDE, SODIUM LACTATE, POTASSIUM CHLORIDE, CALCIUM CHLORIDE 600; 310; 30; 20 MG/100ML; MG/100ML; MG/100ML; MG/100ML
1000 INJECTION, SOLUTION INTRAVENOUS ONCE
Status: COMPLETED | OUTPATIENT
Start: 2019-03-05 | End: 2019-03-05

## 2019-03-05 RX ORDER — POTASSIUM CHLORIDE 29.8 MG/ML
40 INJECTION INTRAVENOUS ONCE
Status: COMPLETED | OUTPATIENT
Start: 2019-03-05 | End: 2019-03-05

## 2019-03-05 RX ORDER — LACTULOSE 20 G/30ML
30 SOLUTION ORAL 2 TIMES DAILY
Status: COMPLETED | OUTPATIENT
Start: 2019-03-05 | End: 2019-03-05

## 2019-03-05 RX ORDER — SUCCINYLCHOLINE CHLORIDE 20 MG/ML
140 INJECTION INTRAMUSCULAR; INTRAVENOUS ONCE
Status: COMPLETED | OUTPATIENT
Start: 2019-03-05 | End: 2019-03-05

## 2019-03-05 RX ORDER — POTASSIUM CHLORIDE 20 MEQ/1
60 TABLET, EXTENDED RELEASE ORAL ONCE
Status: COMPLETED | OUTPATIENT
Start: 2019-03-05 | End: 2019-03-05

## 2019-03-05 RX ORDER — LACTULOSE 20 G/30ML
30 SOLUTION ORAL 2 TIMES DAILY
Status: DISCONTINUED | OUTPATIENT
Start: 2019-03-05 | End: 2019-03-05

## 2019-03-05 RX ADMIN — METOPROLOL TARTRATE 25 MG: 25 TABLET, FILM COATED ORAL at 17:14

## 2019-03-05 RX ADMIN — SENNOSIDES AND DOCUSATE SODIUM 2 TABLET: 8.6; 5 TABLET ORAL at 17:15

## 2019-03-05 RX ADMIN — MORPHINE SULFATE 2 MG: 10 INJECTION INTRAVENOUS at 11:49

## 2019-03-05 RX ADMIN — LEVOTHYROXINE SODIUM 150 MCG: 75 TABLET ORAL at 05:16

## 2019-03-05 RX ADMIN — OMEPRAZOLE 40 MG: 20 CAPSULE, DELAYED RELEASE ORAL at 05:15

## 2019-03-05 RX ADMIN — ATORVASTATIN CALCIUM 40 MG: 40 TABLET, FILM COATED ORAL at 17:15

## 2019-03-05 RX ADMIN — GABAPENTIN 300 MG: 300 CAPSULE ORAL at 17:15

## 2019-03-05 RX ADMIN — MORPHINE SULFATE 5 MG: 10 INJECTION INTRAVENOUS at 02:14

## 2019-03-05 RX ADMIN — SENNOSIDES AND DOCUSATE SODIUM 2 TABLET: 8.6; 5 TABLET ORAL at 05:15

## 2019-03-05 RX ADMIN — LACTULOSE 30 ML: 20 SOLUTION ORAL at 09:28

## 2019-03-05 RX ADMIN — GABAPENTIN 300 MG: 300 CAPSULE ORAL at 05:15

## 2019-03-05 RX ADMIN — SODIUM CHLORIDE, POTASSIUM CHLORIDE, SODIUM LACTATE AND CALCIUM CHLORIDE: 600; 310; 30; 20 INJECTION, SOLUTION INTRAVENOUS at 05:11

## 2019-03-05 RX ADMIN — PROPOFOL 10 MCG/KG/MIN: 10 INJECTION, EMULSION INTRAVENOUS at 21:41

## 2019-03-05 RX ADMIN — ACETAMINOPHEN 650 MG: 325 TABLET, FILM COATED ORAL at 05:15

## 2019-03-05 RX ADMIN — PIPERACILLIN AND TAZOBACTAM 4.5 G: 4; .5 INJECTION, POWDER, LYOPHILIZED, FOR SOLUTION INTRAVENOUS; PARENTERAL at 23:26

## 2019-03-05 RX ADMIN — VANCOMYCIN HYDROCHLORIDE 3000 MG: 100 INJECTION, POWDER, LYOPHILIZED, FOR SOLUTION INTRAVENOUS at 23:34

## 2019-03-05 RX ADMIN — INSULIN HUMAN 1 UNITS: 100 INJECTION, SOLUTION PARENTERAL at 11:43

## 2019-03-05 RX ADMIN — AMIODARONE HYDROCHLORIDE 0.5 MG/MIN: 50 INJECTION, SOLUTION INTRAVENOUS at 13:27

## 2019-03-05 RX ADMIN — MAGNESIUM SULFATE IN WATER 2 G: 40 INJECTION, SOLUTION INTRAVENOUS at 08:27

## 2019-03-05 RX ADMIN — AMITRIPTYLINE HYDROCHLORIDE 300 MG: 100 TABLET, FILM COATED ORAL at 20:00

## 2019-03-05 RX ADMIN — METOPROLOL TARTRATE 25 MG: 25 TABLET, FILM COATED ORAL at 09:28

## 2019-03-05 RX ADMIN — CEFTRIAXONE SODIUM 2 G: 2 INJECTION, POWDER, FOR SOLUTION INTRAMUSCULAR; INTRAVENOUS at 05:12

## 2019-03-05 RX ADMIN — POTASSIUM CHLORIDE 40 MEQ: 29.8 INJECTION, SOLUTION INTRAVENOUS at 08:28

## 2019-03-05 RX ADMIN — INSULIN HUMAN 1 UNITS: 100 INJECTION, SOLUTION PARENTERAL at 01:05

## 2019-03-05 RX ADMIN — ACETAMINOPHEN 650 MG: 325 TABLET, FILM COATED ORAL at 13:23

## 2019-03-05 RX ADMIN — ETOMIDATE 20 MG: 2 INJECTION INTRAVENOUS at 21:40

## 2019-03-05 RX ADMIN — LACTULOSE 30 ML: 20 SOLUTION ORAL at 17:15

## 2019-03-05 RX ADMIN — ACETAMINOPHEN 650 MG: 325 TABLET, FILM COATED ORAL at 19:54

## 2019-03-05 RX ADMIN — SODIUM CHLORIDE, POTASSIUM CHLORIDE, SODIUM LACTATE AND CALCIUM CHLORIDE 1000 ML: 600; 310; 30; 20 INJECTION, SOLUTION INTRAVENOUS at 22:36

## 2019-03-05 RX ADMIN — GABAPENTIN 300 MG: 300 CAPSULE ORAL at 11:44

## 2019-03-05 RX ADMIN — ACETAMINOPHEN 650 MG: 325 TABLET, FILM COATED ORAL at 00:44

## 2019-03-05 RX ADMIN — SUCCINYLCHOLINE CHLORIDE 140 MG: 20 INJECTION, SOLUTION INTRAMUSCULAR; INTRAVENOUS at 21:41

## 2019-03-05 RX ADMIN — ENOXAPARIN SODIUM 40 MG: 100 INJECTION SUBCUTANEOUS at 05:15

## 2019-03-05 RX ADMIN — FENTANYL CITRATE 50 MCG/HR: 50 INJECTION, SOLUTION INTRAMUSCULAR; INTRAVENOUS at 22:10

## 2019-03-05 RX ADMIN — INSULIN HUMAN 1 UNITS: 100 INJECTION, SOLUTION PARENTERAL at 05:21

## 2019-03-05 RX ADMIN — BISACODYL 10 MG: 10 SUPPOSITORY RECTAL at 19:53

## 2019-03-05 RX ADMIN — AMITRIPTYLINE HYDROCHLORIDE 300 MG: 100 TABLET, FILM COATED ORAL at 00:44

## 2019-03-05 RX ADMIN — POLYETHYLENE GLYCOL 3350 1 PACKET: 17 POWDER, FOR SOLUTION ORAL at 00:44

## 2019-03-05 RX ADMIN — POTASSIUM CHLORIDE 60 MEQ: 1500 TABLET, EXTENDED RELEASE ORAL at 03:33

## 2019-03-05 RX ADMIN — ASPIRIN 81 MG 324 MG: 81 TABLET ORAL at 05:15

## 2019-03-05 RX ADMIN — FENTANYL CITRATE 100 MCG: 50 INJECTION INTRAMUSCULAR; INTRAVENOUS at 21:53

## 2019-03-05 ASSESSMENT — ENCOUNTER SYMPTOMS
SPUTUM PRODUCTION: 0
CONSTIPATION: 1
COUGH: 0
CHILLS: 0
ORTHOPNEA: 0
ABDOMINAL PAIN: 1
DIZZINESS: 0
HEMOPTYSIS: 0
PALPITATIONS: 0
VOMITING: 0
NAUSEA: 0

## 2019-03-05 NOTE — PROGRESS NOTES
Hospital Medicine Daily Progress Note    Date of Service  3/5/2019    Chief Complaint  63 y.o. female admitted 2/28/2019 with abdominal pain.    Hospital Course    Ms Segovia has a history of hypertension, hypothyroidism, diabetes and depression.  She was admitted on 2/28/19 with complaints of abdominal pain.  She was hypotensive.  Workup included a CT scan of the abdomen and pelvis that  showed only constipation.  The patient was found to be in septic shock from UTI source.  She has developed AFIB.  Patient was also worked up for possible in hospital stroke but MRI on 3/4/19 was negative for acute finidngs       Interval Problem Update  In AFIB, HR uncontrolled up to 150's, got amio bolus x 2 overnight  Complains of generalized abdominal pain  She is oriented to person place and date, show signs of impulsive behavior, requiring restraints  Core track placed    Consultants/Specialty  Critical Care, I discussed the patient's condition with Dr. Ivey this morning on ICU Rounds    Code Status  Full Code    Disposition  To be determined, suspect she will need post acute care    Review of Systems  Review of Systems   Constitutional: Negative for chills and malaise/fatigue.   Respiratory: Negative for cough, hemoptysis and sputum production.    Cardiovascular: Negative for chest pain, palpitations and orthopnea.   Gastrointestinal: Positive for abdominal pain and constipation. Negative for nausea and vomiting.   Skin: Negative for itching and rash.   Neurological: Negative for dizziness.   All other systems reviewed and are negative.       Physical Exam  Temp:  [36.6 °C (97.9 °F)-38 °C (100.4 °F)] 37.9 °C (100.2 °F)  Pulse:  [117-138] 136  Resp:  [8-44] 26  SpO2:  [89 %-97 %] 91 %    Physical Exam   Constitutional: She is oriented to person, place, and time. She appears well-developed and well-nourished.   HENT:   Head: Normocephalic and atraumatic.   Eyes: Conjunctivae and EOM are normal. Right eye exhibits no  discharge. Left eye exhibits no discharge.   Cardiovascular: Normal rate, regular rhythm and intact distal pulses.    No murmur heard.  Pulmonary/Chest: Effort normal and breath sounds normal. No respiratory distress. She has no wheezes.   Abdominal: Soft. Bowel sounds are normal. She exhibits distension. She exhibits no mass. There is tenderness. There is no rebound and no guarding.   Musculoskeletal: Normal range of motion. She exhibits no edema.   Neurological: She is alert and oriented to person, place, and time. No cranial nerve deficit.   Skin: Skin is warm and dry. She is not diaphoretic. No erythema.   Skin is warm and well perfused   Psychiatric: She has a normal mood and affect.       Fluids    Intake/Output Summary (Last 24 hours) at 03/05/19 0909  Last data filed at 03/05/19 0800   Gross per 24 hour   Intake          2869.46 ml   Output             1715 ml   Net          1154.46 ml       Laboratory  Recent Labs      03/03/19 0319 03/04/19   0300  03/05/19   0204   WBC  8.3  7.5  5.0   RBC  3.48*  3.18*  3.39*   HEMOGLOBIN  10.0*  9.2*  9.8*   HEMATOCRIT  30.9*  28.4*  30.3*   MCV  88.8  89.3  89.4   MCH  28.7  28.9  28.9   MCHC  32.4*  32.4*  32.3*   RDW  47.2  48.4  47.9   PLATELETCT  157*  161*  180   MPV  10.3  9.5  9.6     Recent Labs      03/03/19   0319 03/03/19   1841  03/04/19   0300  03/05/19   0204   SODIUM  137   --   143  141   POTASSIUM  3.4*  3.6  3.4*  3.2*   CHLORIDE  105   --   107  105   CO2  26   --   28  28   GLUCOSE  195*   --   165*  184*   BUN  29*   --   13  14   CREATININE  0.94   --   0.69  0.75   CALCIUM  6.3*   --   7.4*  7.8*                   Imaging  DX-ABDOMEN FOR TUBE PLACEMENT   Final Result         1.  Air-filled distended loops of bowel are seen with reactive mucosal pattern, appearance suggests ileus or enteritis. Recommend radiographic followup to resolution to exclude progression to obstruction.   2.  Dobbhoff tube tip overlying the expected location of the  pylorus or first duodenal segment.   3.  Hazy bilateral lung base opacities suggests infiltrates      MR-BRAIN-W/O   Final Result      1.  No acute abnormality.   2.  The flow voids of the cerebral vessels particularly left M1 segment is patent.The hyperdensity on the recent CT scan likely represent artifact.   3.  Mild cerebral atrophy.      EC-ECHOCARDIOGRAM COMPLETE W/ CONT   Final Result      CT-HEAD W/O   Final Result   Addendum 1 of 1   Call report initiated at 1:40 PM on 3/1/2019.      These findings were discussed with LARRY WISEMAN on 3/1/2019 2:24 PM.      Final      1.  Subtle increased density of LEFT middle cerebral artery and sylvian branches possibly indicating slow flow or occlusion and potential early sign of acute stroke.   2.  No intracranial hemorrhage.   3.  Mild atrophy.      CT-ABDOMEN-PELVIS W/O   Final Result      Decrease in volume of colonic stool with no abnormal bowel dilatation detected      New third spacing with some new mild abdominopelvic ascites. No free air is seen to indicate extravasation. No loculated fluid collection to suggest abscess is noted      No pneumatosis detected to suggest bowel ischemia         DX-ABDOMEN FOR TUBE PLACEMENT   Final Result      Enteric tube terminates over the gastroduodenal junction.      DX-CHEST-LIMITED (1 VIEW)   Final Result      New right IJ line tip overlies the SVC      No radiographic evidence of acute cardiopulmonary process.      DX-CHEST-PORTABLE (1 VIEW)   Final Result      No acute cardiopulmonary abnormality.      CT-CTA COMPLETE THORACOABDOMINAL AORTA   Final Result      1. No evidence of aortic dissection or aneurysm. No high-grade stenosis or occlusion of major pelvic branches vessels.   2. No pulmonary embolus.   3. Moderate to large amount of stool throughout the colon.                 Assessment/Plan  * Septic shock (HCC)- (present on admission)   Assessment & Plan    This is severe sepsis with the following associated acute  organ dysfunction(s): acute kidney failure.   Urinary tract infection source  Antibiotics     Atrial fibrillation and flutter ([facility])   Assessment & Plan    On amiodarone drip for uncontrolled heart rate  Restart metoprolol, a home medication  Continuous hemodynamic monitoring     CN (constipation)   Assessment & Plan    Start scheduled lactulose     Urinary tract infection- (present on admission)   Assessment & Plan    E. Coli  Continue ceftriaxone     Acute encephalopathy   Assessment & Plan    Suspect due to infection     JOSE (acute kidney injury) ([facility])   Assessment & Plan    This morning's labs reviewed, her creatinine is improved to normal range     DM (diabetes mellitus) ([facility])- (present on admission)   Assessment & Plan    Hold oral hypoglycemics while inpatient  Insulin sliding scale     Depression- (present on admission)   Assessment & Plan    Continue Elavil and Prozac     Hypomagnesemia   Assessment & Plan    Replace and monitor     Hypokalemia   Assessment & Plan    3/4 K:3.4  Supplement and check tomorrow labs     Hypocalcemia   Assessment & Plan    3/3 Corrected Calcium for albumin: 7.4  Calcium supplemented 3/3  Monitor     Class 2 severe obesity due to excess calories with serious comorbidity and body mass index (BMI) of 37.0 to 37.9 in adult ([facility])   Assessment & Plan    Body mass index is 39.52 kg/m².     Lactic acidosis- (present on admission)   Assessment & Plan    Improved CO2  Continue IV fluids     Abnormal LFTs   Assessment & Plan    Monitor labs     Hypotension- (present on admission)   Assessment & Plan    Pressor support to keep MAP >65 and SBP >90  Monitor I/O's, vitals, labs          VTE prophylaxis: lovenox

## 2019-03-05 NOTE — PROGRESS NOTES
· 2 RN skin check complete with STACEY [place].  · Devices in place oxymask, pulse oximeter, ECG leads, BP cuff, stat lock, Crabtree catheter, SCDs, armband.  · Skin assessed under devices listed above.  · New potential pressure ulcers noted on L medial/inferior buttock; serous blister. Wound consult placed and wound reported.  · The following interventions in place pulse ox rotation, heels floated, medical devices padded and kept away from the skin, q2h turning.

## 2019-03-05 NOTE — PROGRESS NOTES
Patient transported to MRI via ACLS RN and transport, on monitor, at 1430. Patient agitated in MRI, PRNs given. MD aware. Pt back in room and resting comfortably with family at bedside at 1535.

## 2019-03-05 NOTE — ASSESSMENT & PLAN NOTE
Now rate controlled.   A. fib likely secondary to sepsis.  -Status post amnio drip   -Continue metoprolol  -Telemetry monitoring

## 2019-03-05 NOTE — ASSESSMENT & PLAN NOTE
New onset atrial flutter/fibrillation  She remains in sinus rhythm  Loaded with digoxin on 3/6 - did not require any further digoxin  Optimize magnesium and potassium  Stop amiodarone drip and observe  Echocardiogram unremarkable  TSH normal

## 2019-03-05 NOTE — WOUND TEAM
In to see patient for wound consult to buttock/left IT.  Patient turned first to left side with RN, no open areas notes, mild redness related to moisture.  Patient turned to right side, crease under buttock to thigh with partial thickness wound ~2x2 cm.  Was previously a blister.  Likely related to being in a crease and moisture.  Patient also has small shallow moisture fissure over coccyx.  Barrier paste applied, patient repositioned.  No other needs at this time.  Please re-consult if any other needs come up.

## 2019-03-05 NOTE — PROGRESS NOTES
· 2 RN skin check complete.  · Devices in place: pulse ox, BP cuff, cardiac monitor leads, cortrak,central line, peripheral IV, SCDs, hernandez catheter, bilateral soft wrist restraints.  · Skin assessed under devices: Yes.  · Skin tear left buttock, bruising right inferior abdomen  · The following interventions in place: Q2H turns and repositioning, pulse ox. Heels floated on pillows. Assess under restraints Q2H. Ensure patient is not laying on tubing/lines. Mepilex to sacrum.

## 2019-03-05 NOTE — DIETARY
"Nutrition Support Assessment     Nutrition services:   Day 5 of admit.  Sonya Segovia is a 63 y.o. female with admitting DX of sepsis d/t urinary tract infection.      Current problem list:  1. Septic shock  2. Acute encephalopathy  3. DM  4. JOSE  5. Constipation      Assessment:  Estimated Nutritional Needs: based on:   Height: 172.7 cm (5' 7.99\")  Weight: 118.2 kg (260 lb 9.3 oz)  Weight to Use in Calculations: 113 kg (249 lb 1.9 oz) - suspect closer to pt's dry wt.   Ideal Body Weight: 63.5 kg (140 lb)  Body mass index is 39.63 kg/m². - Obese Class II.     Calculation/Equation: MSJ x 1 = 1730 kcal.   Total Calories / day: 1730 - 1930 (Calories / kg: 15 - 17)  Total Grams Protein / day: 136 - 170 (Grams Protein / k.2 - 1.5)     Evaluation:   1. Pt has been NPO/clears x 4 days.  Consult received for TFs to start.  2. Cortrak placed for enteral access.   3. No staged pressure ulcers per Wound team note from 3/5.   4. Labs: Potassium: 3.2, Glucose: 184.  5. Meds: Rocephin, SSI, Electrolyte replacement, Cordarone, Lactated ringers @ 83 mL/hr, Westley-synephrine has been stopped since 1127 3/4 per MAR.   6. Last BM: 3/4 - loose.   7. Peptamen Intense VHP is an appropriate formula to meet pt's protein needs.     Malnutrition Risk: No risk identified at this time.      Recommendations/Plan:  1. Initiate Peptamen Intense VHP @ 25 mL/hr and advance per protocol to goal rate of 75 mL/hr, providing 1800 kcal, 166 gm protein, 137 g CHO, and 1512 mL of free water per day.    2. Fluids per MD.  3. Monitor weight.    RD following.                 "

## 2019-03-05 NOTE — PROGRESS NOTES
Data entered in 1600 flow sheet and unable to correct to 2000. Please disregard 1600 charting from this RN.

## 2019-03-05 NOTE — PROGRESS NOTES
Abdominal Xray results, abdominal assessment, and patient condition reviewed with Dr. Gonda. Ramon for RN to use feeding tube and continue to monitor. MD to further review chart and put in more orders if warranted.     0154 Patient states she has chest pain. MD notified. Orders received. EKG and lab results discussed with MD. New orders received for a repeat troponin.

## 2019-03-05 NOTE — PROGRESS NOTES
Critical Care Progress Note    Date of admission  2/28/2019    Chief Complaint  63 y.o. female admitted 2/28/2019 with abdominal pain.    Hospital Course    This lady was admitted to the ICU with septic shock due to a UTI.      Interval Problem Update  Reviewed last 24 hour events:      AF with RVR  Oriented x 1  Confused  Replete K and Mg  Amio 0.5  10 L mask - 94  LR at 83  Rocephin day 6  Start TF  Start lactulose  Start metoprolol 25 BID    2100 hours: This lady is doing much worse.  She is tachypneic with a respiratory rate in the 30s to low 40s.  She is febrile to over 102 degrees.  Her abdomen appears more distended.  She is in respiratory distress.  CXR reveals increased bilateral opacities.  Abdominal film reveals air-filled distended loops of bowel concerning for obstruction.  Will stop enteral tube feedings.  I am going to check a CBC, CMP, lactic acid.  I am going to culture her blood and sputum and check a UA.  I am going to stop her Rocephin and start Zosyn and vancomycin empirically.      Review of Systems  Review of Systems   Unable to perform ROS: Acuity of condition        Vital Signs for last 24 hours   Temp:  [36.6 °C (97.9 °F)-38 °C (100.4 °F)] 38 °C (100.4 °F)  Pulse:  [] 118  Resp:  [8-44] 21  SpO2:  [86 %-96 %] 96 %    Hemodynamic parameters for last 24 hours  CVP:  [7 MM HG-11 MM HG] 10 MM HG    Respiratory Information for the last 24 hours       Physical Exam   Physical Exam   Constitutional: She appears distressed.   HENT:   Head: Normocephalic.   Right Ear: External ear normal.   Left Ear: External ear normal.   Nose: Nose normal.   Mouth/Throat: No oropharyngeal exudate.   Eyes: Pupils are equal, round, and reactive to light. Right eye exhibits no discharge. Left eye exhibits no discharge. No scleral icterus.   Neck: Neck supple. No JVD present. No tracheal deviation present.   Cardiovascular: Intact distal pulses.  Exam reveals no friction rub.    Atrial fibrillation with rapid  ventricular response   Pulmonary/Chest: No stridor. She has no wheezes. She has rales (Scattered crackles bilaterally).   She is tachypneic   Abdominal: She exhibits distension. There is no tenderness. There is no rebound and no guarding.   Obese.  Mildly tender.  No peritoneal signs.   Musculoskeletal: She exhibits no deformity.   No clubbing or cyanosis   Neurological:   She is confused.  She answers questions for me, but is disoriented.  She moves all 4 extremities.  She is more restless.   Skin: Skin is warm and dry. No rash noted.       Medications  Current Facility-Administered Medications   Medication Dose Route Frequency Provider Last Rate Last Dose   • amiodarone (CORDARONE) 450 mg in D5W 250 mL Infusion  0.5-1 mg/min Intravenous Continuous Fabricio Ivey M.D. 17 mL/hr at 03/04/19 2027 0.5 mg/min at 03/04/19 2027   • MD Alert...ICU Electrolyte Replacement per Pharmacy   Other PHARMACY TO DOSE Fabricio Ivey M.D.       • phenylephrine (STIVEN-SYNEPHRINE) 40,000 mcg in  mL Infusion  0-300 mcg/min Intravenous Continuous Fabricio Ivey M.D.   Stopped at 03/04/19 1127   • cefTRIAXone (ROCEPHIN) 2 g in  mL IVPB  2 g Intravenous Q24HRS aFbricio Ivey M.D.   Stopped at 03/04/19 1054   • ketorolac (TORADOL) injection 15 mg  15 mg Intravenous Q6HRS PRN KWAKU Granger.O.   15 mg at 03/04/19 1152   • gabapentin (NEURONTIN) capsule 300 mg  300 mg Enteral Tube TID Fabricio Shipley D.O.   Stopped at 03/04/19 1800   • omeprazole 2 mg/mL in sodium bicarbonate (PRILOSEC) oral susp 40 mg  40 mg Enteral Tube DAILY Fabricio Shipley D.O.       • acetaminophen (TYLENOL) tablet 650 mg  650 mg Enteral Tube Q4HRS PRN KWAKU Granger.O.   650 mg at 03/05/19 0044   • aspirin (ASA) chewable tab 324 mg  324 mg Enteral Tube DAILY KWAKU Granger.O.       • atorvastatin (LIPITOR) tablet 40 mg  40 mg Enteral Tube Q EVENING Fabricio Shipley D.O.   Stopped at 03/04/19 1800   • amitriptyline  (ELAVIL) tablet 300 mg  300 mg Enteral Tube Nightly MESERET GrangerOAngela   300 mg at 03/05/19 0044   • levothyroxine (SYNTHROID) tablet 150 mcg  150 mcg Enteral Tube AM ES Fabricio Shipley D.O.       • senna-docusate (PERICOLACE or SENOKOT S) 8.6-50 MG per tablet 2 Tab  2 Tab Enteral Tube BID Fabricio Shipley D.O.   Stopped at 03/04/19 1800    And   • polyethylene glycol/lytes (MIRALAX) PACKET 1 Packet  1 Packet Enteral Tube QDAY PRN MESERET GrangerO.   1 Packet at 03/05/19 0044    And   • magnesium hydroxide (MILK OF MAGNESIA) suspension 30 mL  30 mL Enteral Tube QDAY PRN Fabricio Shipley D.O.        And   • bisacodyl (DULCOLAX) suppository 10 mg  10 mg Rectal QDAY PRN Fabricio Shipley D.O.       • tizanidine (ZANAFLEX) tablet 2 mg  2 mg Enteral Tube Q8HRS PRN Fabricio Shipley D.O.       • enoxaparin (LOVENOX) inj 40 mg  40 mg Subcutaneous DAILY Fabricio Ivey M.D.       • lactated ringers infusion   Intravenous Continuous Ace Patiño M.D. 83 mL/hr at 03/04/19 1642     • morphine (pf) 10 mg/mL injection 2-5 mg  2-5 mg Intravenous Q2HRS PRN MESERET GrangerOAngela   5 mg at 03/05/19 0214   • Respiratory Care per Protocol   Nebulization Continuous RT Kareem Solis M.D.       • ondansetron (ZOFRAN) syringe/vial injection 4 mg  4 mg Intravenous Q4HRS PRN Kareem Solis M.D.   4 mg at 03/01/19 0137   • ondansetron (ZOFRAN ODT) dispertab 4 mg  4 mg Oral Q4HRS PRN Kareem Solis M.D.       • promethazine (PHENERGAN) tablet 12.5-25 mg  12.5-25 mg Oral Q4HRS PRN Kareem Solis M.D.       • promethazine (PHENERGAN) suppository 12.5-25 mg  12.5-25 mg Rectal Q4HRS PRN Kareem Solis M.D.       • prochlorperazine (COMPAZINE) injection 5-10 mg  5-10 mg Intravenous Q4HRS PRN Kareem Solis M.D.       • insulin regular (HUMULIN R) injection 1-6 Units  1-6 Units Subcutaneous Q6HRS Kareem Solis M.D.   1 Units at 03/05/19 0105    And   • glucose 4 g chewable tablet 16 g  16 g Oral Q15 MIN PRN Kareem  NINFA Solis M.D.        And   • dextrose 50% (D50W) injection 25 mL  25 mL Intravenous Q15 MIN PRN Kareem Solis M.D.           Fluids    Intake/Output Summary (Last 24 hours) at 03/05/19 0457  Last data filed at 03/05/19 0330   Gross per 24 hour   Intake          4309.77 ml   Output             2150 ml   Net          2159.77 ml       Laboratory  Recent Labs      03/03/19   0501   ISTATAPH  7.438   ISTATAPCO2  35.1   ISTATAPO2  84   ISTATATCO2  25   VJVBSEG1AXA  97   ISTATARTHCO3  23.8   ISTATARTBE  0   ISTATTEMP  95.6 F   ISTATFIO2  5   ISTATSPEC  Arterial   ISTATAPHTC  7.463   UATCMPBI5FU  75     Recent Labs      03/05/19   0204   TROPONINI  0.01     Recent Labs      03/03/19   0319 03/03/19   1841  03/04/19   0300  03/05/19   0204   SODIUM  137   --   143  141   POTASSIUM  3.4*  3.6  3.4*  3.2*   CHLORIDE  105   --   107  105   CO2  26   --   28  28   BUN  29*   --   13  14   CREATININE  0.94   --   0.69  0.75   MAGNESIUM   --    --   1.6  1.8   CALCIUM  6.3*   --   7.4*  7.8*     Recent Labs      03/03/19 0319 03/04/19   0300  03/05/19   0204   GLUCOSE  195*  165*  184*     Recent Labs      03/03/19 0319 03/04/19   0300  03/05/19   0204   WBC  8.3  7.5  5.0   NEUTSPOLYS  53.00  70.40  70.40   LYMPHOCYTES  21.00*  19.10*  20.90*   MONOCYTES  7.00  4.40  3.50   EOSINOPHILS  3.00  0.90  2.60   BASOPHILS  0.00  1.70  0.90     Recent Labs      03/03/19 0319 03/04/19   0300  03/05/19   0204   RBC  3.48*  3.18*  3.39*   HEMOGLOBIN  10.0*  9.2*  9.8*   HEMATOCRIT  30.9*  28.4*  30.3*   PLATELETCT  157*  161*  180       Imaging  X-Ray:  I have personally reviewed the images and compared with prior images. and My impression is: Increased bilateral opacities    Assessment/Plan  * Septic shock (HCC)- (present on admission)   Assessment & Plan    Severe sepsis with associated acute encephalopathy and acute kidney injury  Urinary source  She is febrile today  Check lactic acid  Reculture blood and sputum  Check  UA  Stop Rocephin  Start empiric Zosyn and vancomycin  Titrating vasopressors to keep mean arterial pressure greater than 65     Acute hypoxemic respiratory failure (HCC)   Assessment & Plan    Worse  Intubated on 3/5  All appropriate ventilator bundles have been put into place  Full vent support     Atrial fibrillation and flutter (HCC)   Assessment & Plan    New onset atrial flutter/fibrillation  TSH normal  Echocardiogram unremarkable  Optimize magnesium and potassium  Continue amiodarone drip  Use phenylephrine for vasopressor support     CN (constipation)   Assessment & Plan    Start lactulose     Urinary tract infection- (present on admission)   Assessment & Plan    Urine culture with E. coli  Change Rocephin back to Zosyn due to increasing fever     Acute encephalopathy   Assessment & Plan    Suspect due to sepsis and metabolic  MRI without evidence of acute stroke  Ammonia level normal  Follow neuro exam  Limit sedatives     JOSE (acute kidney injury) ([facility])   Assessment & Plan    Resolved  Monitor renal function     DM (diabetes mellitus) ([facility])- (present on admission)   Assessment & Plan    Continue sliding scale insulin     Depression- (present on admission)   Assessment & Plan    Continue amitriptyline, 300 mg nightly     Hypomagnesemia   Assessment & Plan    Replete magnesium     Hypokalemia   Assessment & Plan    Replete potassium          VTE:  Lovenox  Ulcer: PPI  Lines: Central Line  Ongoing indication addressed and Crabtree Catheter  Ongoing indication addressed    I have performed a physical exam and reviewed and updated ROS and Plan today (3/5/2019). In review of yesterday's note (3/4/2019), there are no changes except as documented above.     I have assessed and reassessed her respiratory status and made ventilator adjustments based upon arterial blood gas analysis as well as analysis of ventilator waveforms and airway mechanics.  I have assessed and reassessed her blood pressure, hemodynamics,  cardiovascular status as well as her neurologic status.  She is at increased risk for worsening respiratory, cardiovascular, gastrointestinal and CNS system dysfunction.    Discussed patient condition and risk of morbidity and/or mortality with Hospitalist, RN, RT, Pharmacy, Charge nurse / hot rounds and QA team     The patient remains critically ill.  Critical care time = 38 minutes minutes in directly providing and coordinating critical care and extensive data review.  No time overlap and excludes procedures.    Fabricio Ivey MD  Pulmonary and Critical Care Medicine

## 2019-03-06 ENCOUNTER — APPOINTMENT (OUTPATIENT)
Dept: RADIOLOGY | Facility: MEDICAL CENTER | Age: 64
DRG: 871 | End: 2019-03-06
Attending: INTERNAL MEDICINE
Payer: MEDICARE

## 2019-03-06 ENCOUNTER — APPOINTMENT (OUTPATIENT)
Dept: RADIOLOGY | Facility: MEDICAL CENTER | Age: 64
DRG: 871 | End: 2019-03-06
Attending: HOSPITALIST
Payer: MEDICARE

## 2019-03-06 PROBLEM — R14.0 ABDOMINAL DISTENTION: Status: ACTIVE | Noted: 2019-02-28

## 2019-03-06 LAB
ACTION RANGE TRIGGERED IACRT: NO
ACTION RANGE TRIGGERED IACRT: NO
ANION GAP SERPL CALC-SCNC: 9 MMOL/L (ref 0–11.9)
ANISOCYTOSIS BLD QL SMEAR: ABNORMAL
BACTERIA BLD CULT: NORMAL
BASE EXCESS BLDA CALC-SCNC: 4 MMOL/L (ref -4–3)
BASE EXCESS BLDA CALC-SCNC: 4 MMOL/L (ref -4–3)
BASOPHILS # BLD AUTO: 0 % (ref 0–1.8)
BASOPHILS # BLD: 0 K/UL (ref 0–0.12)
BODY TEMPERATURE: ABNORMAL DEGREES
BODY TEMPERATURE: ABNORMAL DEGREES
BUN SERPL-MCNC: 16 MG/DL (ref 8–22)
CALCIUM SERPL-MCNC: 8.1 MG/DL (ref 8.5–10.5)
CHLORIDE SERPL-SCNC: 110 MMOL/L (ref 96–112)
CO2 BLDA-SCNC: 30 MMOL/L (ref 20–33)
CO2 BLDA-SCNC: 30 MMOL/L (ref 20–33)
CO2 SERPL-SCNC: 29 MMOL/L (ref 20–33)
CREAT SERPL-MCNC: 0.9 MG/DL (ref 0.5–1.4)
CRP SERPL HS-MCNC: 16.38 MG/DL (ref 0–0.75)
CYTOLOGY REG CYTOL: NORMAL
EOSINOPHIL # BLD AUTO: 0 K/UL (ref 0–0.51)
EOSINOPHIL NFR BLD: 0 % (ref 0–6.9)
ERYTHROCYTE [DISTWIDTH] IN BLOOD BY AUTOMATED COUNT: 48.4 FL (ref 35.9–50)
GLUCOSE BLD-MCNC: 137 MG/DL (ref 65–99)
GLUCOSE BLD-MCNC: 140 MG/DL (ref 65–99)
GLUCOSE BLD-MCNC: 155 MG/DL (ref 65–99)
GLUCOSE BLD-MCNC: 165 MG/DL (ref 65–99)
GLUCOSE SERPL-MCNC: 149 MG/DL (ref 65–99)
GRAM STN SPEC: NORMAL
HCO3 BLDA-SCNC: 28.8 MMOL/L (ref 17–25)
HCO3 BLDA-SCNC: 29.1 MMOL/L (ref 17–25)
HCT VFR BLD AUTO: 30.1 % (ref 37–47)
HGB BLD-MCNC: 9.6 G/DL (ref 12–16)
HOROWITZ INDEX BLDA+IHG-RTO: 241 MM[HG]
HOROWITZ INDEX BLDA+IHG-RTO: 280 MM[HG]
INST. QUALIFIED PATIENT IIQPT: YES
INST. QUALIFIED PATIENT IIQPT: YES
LACTATE BLD-SCNC: 0.9 MMOL/L (ref 0.5–2)
LACTATE BLD-SCNC: 1.3 MMOL/L (ref 0.5–2)
LACTATE BLD-SCNC: 1.3 MMOL/L (ref 0.5–2)
LACTATE BLD-SCNC: 1.6 MMOL/L (ref 0.5–2)
LYMPHOCYTES # BLD AUTO: 2.09 K/UL (ref 1–4.8)
LYMPHOCYTES NFR BLD: 31.6 % (ref 22–41)
MAGNESIUM SERPL-MCNC: 1.9 MG/DL (ref 1.5–2.5)
MANUAL DIFF BLD: NORMAL
MCH RBC QN AUTO: 28.5 PG (ref 27–33)
MCHC RBC AUTO-ENTMCNC: 31.9 G/DL (ref 33.6–35)
MCV RBC AUTO: 89.3 FL (ref 81.4–97.8)
MONOCYTES # BLD AUTO: 0.4 K/UL (ref 0–0.85)
MONOCYTES NFR BLD AUTO: 6.1 % (ref 0–13.4)
MORPHOLOGY BLD-IMP: NORMAL
NEUTROPHILS # BLD AUTO: 4.11 K/UL (ref 2–7.15)
NEUTROPHILS NFR BLD: 58.8 % (ref 44–72)
NEUTS BAND NFR BLD MANUAL: 3.5 % (ref 0–10)
NRBC # BLD AUTO: 0.02 K/UL
NRBC BLD-RTO: 0.3 /100 WBC
O2/TOTAL GAS SETTING VFR VENT: 100 %
O2/TOTAL GAS SETTING VFR VENT: 60 %
PCO2 BLDA: 42 MMHG (ref 26–37)
PCO2 BLDA: 43.6 MMHG (ref 26–37)
PCO2 TEMP ADJ BLDA: 45.2 MMHG (ref 26–37)
PH BLDA: 7.43 [PH] (ref 7.4–7.5)
PH BLDA: 7.44 [PH] (ref 7.4–7.5)
PH TEMP ADJ BLDA: 7.42 [PH] (ref 7.4–7.5)
PLATELET # BLD AUTO: 249 K/UL (ref 164–446)
PLATELET BLD QL SMEAR: NORMAL
PMV BLD AUTO: 9.7 FL (ref 9–12.9)
PO2 BLDA: 168 MMHG (ref 64–87)
PO2 BLDA: 241 MMHG (ref 64–87)
PO2 TEMP ADJ BLDA: 245 MMHG (ref 64–87)
POTASSIUM SERPL-SCNC: 3.8 MMOL/L (ref 3.6–5.5)
PREALB SERPL-MCNC: <3 MG/DL (ref 18–38)
RBC # BLD AUTO: 3.37 M/UL (ref 4.2–5.4)
RBC BLD AUTO: PRESENT
RHODAMINE-AURAMINE STN SPEC: NORMAL
SAO2 % BLDA: 100 % (ref 93–99)
SAO2 % BLDA: 100 % (ref 93–99)
SIGNIFICANT IND 70042: NORMAL
SITE SITE: NORMAL
SODIUM SERPL-SCNC: 148 MMOL/L (ref 135–145)
SOURCE SOURCE: NORMAL
SPECIMEN DRAWN FROM PATIENT: ABNORMAL
SPECIMEN DRAWN FROM PATIENT: ABNORMAL
VANCOMYCIN SERPL-MCNC: 20.3 UG/ML
WBC # BLD AUTO: 6.6 K/UL (ref 4.8–10.8)

## 2019-03-06 PROCEDURE — 99292 CRITICAL CARE ADDL 30 MIN: CPT | Performed by: INTERNAL MEDICINE

## 2019-03-06 PROCEDURE — 700101 HCHG RX REV CODE 250: Performed by: INTERNAL MEDICINE

## 2019-03-06 PROCEDURE — 99233 SBSQ HOSP IP/OBS HIGH 50: CPT | Performed by: HOSPITALIST

## 2019-03-06 PROCEDURE — 94003 VENT MGMT INPAT SUBQ DAY: CPT

## 2019-03-06 PROCEDURE — 80202 ASSAY OF VANCOMYCIN: CPT

## 2019-03-06 PROCEDURE — 87641 MR-STAPH DNA AMP PROBE: CPT

## 2019-03-06 PROCEDURE — 700111 HCHG RX REV CODE 636 W/ 250 OVERRIDE (IP): Performed by: INTERNAL MEDICINE

## 2019-03-06 PROCEDURE — 83605 ASSAY OF LACTIC ACID: CPT

## 2019-03-06 PROCEDURE — 74177 CT ABD & PELVIS W/CONTRAST: CPT

## 2019-03-06 PROCEDURE — A9270 NON-COVERED ITEM OR SERVICE: HCPCS | Performed by: HOSPITALIST

## 2019-03-06 PROCEDURE — 84134 ASSAY OF PREALBUMIN: CPT

## 2019-03-06 PROCEDURE — 700102 HCHG RX REV CODE 250 W/ 637 OVERRIDE(OP): Performed by: HOSPITALIST

## 2019-03-06 PROCEDURE — 700105 HCHG RX REV CODE 258: Performed by: INTERNAL MEDICINE

## 2019-03-06 PROCEDURE — 770022 HCHG ROOM/CARE - ICU (200)

## 2019-03-06 PROCEDURE — 83735 ASSAY OF MAGNESIUM: CPT

## 2019-03-06 PROCEDURE — 82803 BLOOD GASES ANY COMBINATION: CPT

## 2019-03-06 PROCEDURE — A9270 NON-COVERED ITEM OR SERVICE: HCPCS | Performed by: INTERNAL MEDICINE

## 2019-03-06 PROCEDURE — 86140 C-REACTIVE PROTEIN: CPT

## 2019-03-06 PROCEDURE — 80048 BASIC METABOLIC PNL TOTAL CA: CPT

## 2019-03-06 PROCEDURE — 71045 X-RAY EXAM CHEST 1 VIEW: CPT

## 2019-03-06 PROCEDURE — 82962 GLUCOSE BLOOD TEST: CPT | Mod: 91

## 2019-03-06 PROCEDURE — 99291 CRITICAL CARE FIRST HOUR: CPT | Mod: 25 | Performed by: INTERNAL MEDICINE

## 2019-03-06 PROCEDURE — 700111 HCHG RX REV CODE 636 W/ 250 OVERRIDE (IP): Performed by: HOSPITALIST

## 2019-03-06 PROCEDURE — 700102 HCHG RX REV CODE 250 W/ 637 OVERRIDE(OP): Performed by: INTERNAL MEDICINE

## 2019-03-06 PROCEDURE — 85007 BL SMEAR W/DIFF WBC COUNT: CPT

## 2019-03-06 PROCEDURE — 85027 COMPLETE CBC AUTOMATED: CPT

## 2019-03-06 RX ORDER — DIGOXIN 0.25 MG/ML
500 INJECTION INTRAMUSCULAR; INTRAVENOUS ONCE
Status: COMPLETED | OUTPATIENT
Start: 2019-03-06 | End: 2019-03-06

## 2019-03-06 RX ORDER — MAGNESIUM SULFATE HEPTAHYDRATE 40 MG/ML
2 INJECTION, SOLUTION INTRAVENOUS ONCE
Status: COMPLETED | OUTPATIENT
Start: 2019-03-06 | End: 2019-03-06

## 2019-03-06 RX ORDER — METOPROLOL TARTRATE 1 MG/ML
5 INJECTION, SOLUTION INTRAVENOUS
Status: COMPLETED | OUTPATIENT
Start: 2019-03-06 | End: 2019-03-06

## 2019-03-06 RX ORDER — PROMETHAZINE HYDROCHLORIDE 25 MG/1
12.5-25 TABLET ORAL EVERY 4 HOURS PRN
Status: DISCONTINUED | OUTPATIENT
Start: 2019-03-06 | End: 2019-03-12

## 2019-03-06 RX ORDER — AMOXICILLIN 250 MG
2 CAPSULE ORAL 2 TIMES DAILY
Status: DISCONTINUED | OUTPATIENT
Start: 2019-03-06 | End: 2019-03-08

## 2019-03-06 RX ORDER — POLYETHYLENE GLYCOL 3350 17 G/17G
1 POWDER, FOR SOLUTION ORAL
Status: DISCONTINUED | OUTPATIENT
Start: 2019-03-06 | End: 2019-03-12

## 2019-03-06 RX ORDER — DIGOXIN 0.25 MG/ML
250 INJECTION INTRAMUSCULAR; INTRAVENOUS EVERY 6 HOURS
Status: COMPLETED | OUTPATIENT
Start: 2019-03-06 | End: 2019-03-07

## 2019-03-06 RX ORDER — BISACODYL 10 MG
10 SUPPOSITORY, RECTAL RECTAL
Status: DISCONTINUED | OUTPATIENT
Start: 2019-03-06 | End: 2019-03-08

## 2019-03-06 RX ORDER — ONDANSETRON 4 MG/1
4 TABLET, ORALLY DISINTEGRATING ORAL EVERY 4 HOURS PRN
Status: DISCONTINUED | OUTPATIENT
Start: 2019-03-06 | End: 2019-03-12

## 2019-03-06 RX ORDER — DEXMEDETOMIDINE HYDROCHLORIDE 4 UG/ML
.1-1.5 INJECTION, SOLUTION INTRAVENOUS CONTINUOUS
Status: DISCONTINUED | OUTPATIENT
Start: 2019-03-06 | End: 2019-03-09

## 2019-03-06 RX ADMIN — ACETAMINOPHEN 650 MG: 325 TABLET, FILM COATED ORAL at 06:43

## 2019-03-06 RX ADMIN — PROPOFOL 30 MCG/KG/MIN: 10 INJECTION, EMULSION INTRAVENOUS at 16:12

## 2019-03-06 RX ADMIN — PHENYLEPHRINE HYDROCHLORIDE 100 MCG/MIN: 10 INJECTION INTRAVENOUS at 22:49

## 2019-03-06 RX ADMIN — PIPERACILLIN AND TAZOBACTAM 4.5 G: 4; .5 INJECTION, POWDER, LYOPHILIZED, FOR SOLUTION INTRAVENOUS; PARENTERAL at 02:38

## 2019-03-06 RX ADMIN — DIGOXIN 500 MCG: 0.25 INJECTION INTRAMUSCULAR; INTRAVENOUS at 15:05

## 2019-03-06 RX ADMIN — METOPROLOL TARTRATE 5 MG: 1 INJECTION, SOLUTION INTRAVENOUS at 20:26

## 2019-03-06 RX ADMIN — PIPERACILLIN AND TAZOBACTAM 4.5 G: 4; .5 INJECTION, POWDER, LYOPHILIZED, FOR SOLUTION INTRAVENOUS; PARENTERAL at 22:12

## 2019-03-06 RX ADMIN — ONDANSETRON 4 MG: 2 INJECTION INTRAMUSCULAR; INTRAVENOUS at 22:48

## 2019-03-06 RX ADMIN — PROPOFOL 20 MCG/KG/MIN: 10 INJECTION, EMULSION INTRAVENOUS at 11:28

## 2019-03-06 RX ADMIN — PIPERACILLIN AND TAZOBACTAM 4.5 G: 4; .5 INJECTION, POWDER, LYOPHILIZED, FOR SOLUTION INTRAVENOUS; PARENTERAL at 13:00

## 2019-03-06 RX ADMIN — INSULIN HUMAN 1 UNITS: 100 INJECTION, SOLUTION PARENTERAL at 11:31

## 2019-03-06 RX ADMIN — METOPROLOL TARTRATE 5 MG: 1 INJECTION, SOLUTION INTRAVENOUS at 20:50

## 2019-03-06 RX ADMIN — DEXMEDETOMIDINE HYDROCHLORIDE 0.2 MCG/KG/HR: 100 INJECTION, SOLUTION INTRAVENOUS at 20:22

## 2019-03-06 RX ADMIN — ACETAMINOPHEN 650 MG: 325 TABLET, FILM COATED ORAL at 21:39

## 2019-03-06 RX ADMIN — PHENYLEPHRINE HYDROCHLORIDE 30 MCG/MIN: 10 INJECTION INTRAVENOUS at 01:11

## 2019-03-06 RX ADMIN — METOPROLOL TARTRATE 5 MG: 1 INJECTION, SOLUTION INTRAVENOUS at 21:25

## 2019-03-06 RX ADMIN — INSULIN HUMAN 1 UNITS: 100 INJECTION, SOLUTION PARENTERAL at 01:18

## 2019-03-06 RX ADMIN — SODIUM CHLORIDE, POTASSIUM CHLORIDE, SODIUM LACTATE AND CALCIUM CHLORIDE: 600; 310; 30; 20 INJECTION, SOLUTION INTRAVENOUS at 05:45

## 2019-03-06 RX ADMIN — AMIODARONE HYDROCHLORIDE 0.5 MG/MIN: 50 INJECTION, SOLUTION INTRAVENOUS at 22:49

## 2019-03-06 RX ADMIN — MAGNESIUM SULFATE IN WATER 2 G: 40 INJECTION, SOLUTION INTRAVENOUS at 07:41

## 2019-03-06 RX ADMIN — AMIODARONE HYDROCHLORIDE 0.5 MG/MIN: 50 INJECTION, SOLUTION INTRAVENOUS at 05:41

## 2019-03-06 RX ADMIN — PROPOFOL 30 MCG/KG/MIN: 10 INJECTION, EMULSION INTRAVENOUS at 05:42

## 2019-03-06 RX ADMIN — DEXMEDETOMIDINE HYDROCHLORIDE 1.4 MCG/KG/HR: 100 INJECTION, SOLUTION INTRAVENOUS at 22:48

## 2019-03-06 RX ADMIN — PHENYLEPHRINE HYDROCHLORIDE 100 MCG/MIN: 10 INJECTION INTRAVENOUS at 16:16

## 2019-03-06 RX ADMIN — DIGOXIN 250 MCG: 0.25 INJECTION INTRAMUSCULAR; INTRAVENOUS at 22:54

## 2019-03-06 NOTE — THERAPY
"PT consult received. Pt continues to be tachycardic and is now intubated, with restraints, and on bed rest. Not appropriate for PT evaluation at this time. Order \"completed,\" please re-order when pt is appropriate to participate in OOB activity.   "

## 2019-03-06 NOTE — CARE PLAN
Problem: Ventilation Defect:  Goal: Ability to achieve and maintain unassisted ventilation or tolerate decreased levels of ventilator support  Outcome: NOT MET    Intervention: Support and monitor invasive and noninvasive mechanical ventilation  Adult Ventilation Update    Total Vent Days: 2    Patient Lines/Drains/Airways Status    Active Airway     Name: Placement date: Placement time: Site: Days:    Airway ETT Oral 7.5 03/05/19   2145   Oral   less than 1

## 2019-03-06 NOTE — PROGRESS NOTES
Patient breathing 30-40 times per minute, agitated, anxious, restless, SPO2 upper 80s to 90s on 10 liters oxymask. MD informed. Bowel pattern and medications discussed. MD to come to bedside and assess patient.    2050. Dr. Ivey at bedside. Patient very flushed RR 40s, HR 150s, and appears uncomfortable. Patient still following commands, but confused.    2142 Patient intubated by MD. OG placed to LIS. Safety measures and ambu bag at bedside. Orders received from MD.

## 2019-03-06 NOTE — THERAPY
"Pt now intubated. Continues to be confused, restrained, tachycardic. Not appropriate for OT at this time. Order \"completed\". Please re-order when pt appropriate for OOB activity.   "

## 2019-03-06 NOTE — PROGRESS NOTES
Hospital Medicine Daily Progress Note    Date of Service  3/6/2019    Chief Complaint  63 y.o. female admitted 2/28/2019 with abdominal pain.    Hospital Course    Ms Segovia has a history of hypertension, hypothyroidism, diabetes and depression.  She was admitted on 2/28/19 with complaints of abdominal pain.  She was hypotensive.  Workup included a CT scan of the abdomen and pelvis that  showed only constipation.  The patient was found to be in septic shock from UTI source.  She has developed AFIB.  Patient was also worked up for possible in hospital stroke but MRI on 3/4/19 was negative for acute finidngs       Interval Problem Update  Worsened respiratory status and work of breathing overnight, she required intubation  Patient intubated and sedated in the ICU, thus unable to provide interval history  Sedated with propofol and fentayl, too sedated to follow commands  Started on phenylephrine overnight, titrating to maintain MAP of 65  Urine output 1.5L overnight    Consultants/Specialty  Critical Care, I discussed the patient's condition with Dr. Ivey this morning on ICU Rounds  Surgery, Dr. Gama    Code Status  Full Code    Disposition  To be determined, suspect she will need post acute care    Review of Systems  Review of Systems   Unable to perform ROS: Intubated        Physical Exam  Temp:  [37.4 °C (99.3 °F)-39.2 °C (102.5 °F)] 37.4 °C (99.4 °F)  Pulse:  [] 149  Resp:  [12-78] 23  SpO2:  [89 %-100 %] 95 %    Physical Exam   Constitutional: She appears well-developed and well-nourished.   HENT:   Head: Normocephalic and atraumatic.   Eyes: Conjunctivae and EOM are normal. Right eye exhibits no discharge. Left eye exhibits no discharge.   Cardiovascular: Normal rate, regular rhythm and intact distal pulses.    No murmur heard.  Pulmonary/Chest:   Equal chest rise and fall  Mechanical breath sounds bilaterally  No overt wheezing   Abdominal: Soft. Bowel sounds are normal. She exhibits distension.  She exhibits no mass. There is tenderness. There is no rebound and no guarding.   Musculoskeletal: Normal range of motion. She exhibits no edema.   Neurological:   Intubated and sedated   Skin: Skin is warm and dry. She is not diaphoretic. No erythema.   Skin is warm and well perfused   Psychiatric: She has a normal mood and affect.       Fluids    Intake/Output Summary (Last 24 hours) at 03/06/19 1249  Last data filed at 03/06/19 0800   Gross per 24 hour   Intake          4377.19 ml   Output             1305 ml   Net          3072.19 ml       Laboratory  Recent Labs      03/05/19   0204  03/05/19 2130 03/06/19   0209   WBC  5.0  8.1  6.6   RBC  3.39*  3.87*  3.37*   HEMOGLOBIN  9.8*  11.0*  9.6*   HEMATOCRIT  30.3*  34.3*  30.1*   MCV  89.4  88.6  89.3   MCH  28.9  28.4  28.5   MCHC  32.3*  32.1*  31.9*   RDW  47.9  47.9  48.4   PLATELETCT  180  296  249   MPV  9.6  9.9  9.7     Recent Labs      03/05/19   0204  03/05/19   1315  03/05/19 2130 03/06/19   0209   SODIUM  141   --   145  148*   POTASSIUM  3.2*  4.0  4.3  3.8   CHLORIDE  105   --   109  110   CO2  28   --   25  29   GLUCOSE  184*   --   222*  149*   BUN  14   --   15  16   CREATININE  0.75   --   0.79  0.90   CALCIUM  7.8*   --   7.7*  8.1*             Recent Labs      03/05/19 2130   TRIGLYCERIDE  294*       Imaging  DX-CHEST-PORTABLE (1 VIEW)   Final Result         1.  Pulmonary edema and/or infiltrates are identified, which are somewhat decreased since the prior exam.   2.  Atherosclerosis         DX-ABDOMEN FOR TUBE PLACEMENT   Final Result         1.  Filled distended bowel loops, appearance suggests ileus or evolving obstructive changes.   2.  Nasogastric tube tip terminates overlying the expected location of the gastric body.   3.  Pulmonary edema and/or infiltrates      DX-CHEST-PORTABLE (1 VIEW)   Final Result         1.  Pulmonary edema and/or infiltrates are identified, which appear somewhat increased since the prior exam.       YG-PAOROWH-5 VIEW   Final Result         1.  Air-filled distended loops of bowel are seen. Appearance raises concern for obstruction, radiographic follow-up to resolution recommended.   2.  Dobbhoff tube tip overlying the expected location of the pylorus or first duodenal segment.   3.  Hazy bilateral lung base infiltrates      DX-CHEST-PORTABLE (1 VIEW)   Final Result         1.  Pulmonary edema and/or infiltrates are identified, which appear somewhat increased since the prior exam.      DX-ABDOMEN FOR TUBE PLACEMENT   Final Result         1.  Air-filled distended loops of bowel are seen with reactive mucosal pattern, appearance suggests ileus or enteritis. Recommend radiographic followup to resolution to exclude progression to obstruction.   2.  Dobbhoff tube tip overlying the expected location of the pylorus or first duodenal segment.   3.  Hazy bilateral lung base opacities suggests infiltrates      MR-BRAIN-W/O   Final Result      1.  No acute abnormality.   2.  The flow voids of the cerebral vessels particularly left M1 segment is patent.The hyperdensity on the recent CT scan likely represent artifact.   3.  Mild cerebral atrophy.      EC-ECHOCARDIOGRAM COMPLETE W/ CONT   Final Result      CT-HEAD W/O   Final Result   Addendum 1 of 1   Call report initiated at 1:40 PM on 3/1/2019.      These findings were discussed with LARRY WISEMAN on 3/1/2019 2:24 PM.      Final      1.  Subtle increased density of LEFT middle cerebral artery and sylvian branches possibly indicating slow flow or occlusion and potential early sign of acute stroke.   2.  No intracranial hemorrhage.   3.  Mild atrophy.      CT-ABDOMEN-PELVIS W/O   Final Result      Decrease in volume of colonic stool with no abnormal bowel dilatation detected      New third spacing with some new mild abdominopelvic ascites. No free air is seen to indicate extravasation. No loculated fluid collection to suggest abscess is noted      No pneumatosis detected to  suggest bowel ischemia         DX-ABDOMEN FOR TUBE PLACEMENT   Final Result      Enteric tube terminates over the gastroduodenal junction.      DX-CHEST-LIMITED (1 VIEW)   Final Result      New right IJ line tip overlies the SVC      No radiographic evidence of acute cardiopulmonary process.      DX-CHEST-PORTABLE (1 VIEW)   Final Result      No acute cardiopulmonary abnormality.      CT-CTA COMPLETE THORACOABDOMINAL AORTA   Final Result      1. No evidence of aortic dissection or aneurysm. No high-grade stenosis or occlusion of major pelvic branches vessels.   2. No pulmonary embolus.   3. Moderate to large amount of stool throughout the colon.                 Assessment/Plan  * Septic shock (HCC)- (present on admission)   Assessment & Plan    This is severe sepsis with the following associated acute organ dysfunction(s): acute kidney failure.   Urinary tract infection source  Expand spectrum of antibiotics given worsened clinical status:  Zosyn and vancomycin  Dose of vancomycin will be titrated to serum concentration levels to ensure adequate levels and reduce risk of toxicity     Atrial fibrillation and flutter (HCC)   Assessment & Plan    On amiodarone drip for uncontrolled heart rate  Restart metoprolol, a home medication  Continuous hemodynamic monitoring     CN (constipation)   Assessment & Plan    Distended abdomen, decreased bowel sounds  Worsened yesterday  Suspect in part is cause of her respiratory failure  I have placed a call to Dr. Gama of surgery for assistance, the patient has already had 2 CT scans that showed no evidence of obstruction but abdominal distention/ileus picture persists     Urinary tract infection- (present on admission)   Assessment & Plan    E. Coli  Continue ceftriaxone     Acute encephalopathy   Assessment & Plan    Suspect due to infection     JOSE (acute kidney injury) (HCC)   Assessment & Plan    This morning's labs reviewed, her creatinine is improved to normal range     DM  (diabetes mellitus) ([facility])- (present on admission)   Assessment & Plan    Hold oral hypoglycemics while inpatient  Insulin sliding scale     Depression- (present on admission)   Assessment & Plan    Continue Elavil and Prozac     Hypomagnesemia   Assessment & Plan    Replace and monitor     Hypokalemia   Assessment & Plan    3/4 K:3.4  Supplement and check tomorrow labs     Hypocalcemia   Assessment & Plan    3/3 Corrected Calcium for albumin: 7.4  Calcium supplemented 3/3  Monitor     Class 2 severe obesity due to excess calories with serious comorbidity and body mass index (BMI) of 37.0 to 37.9 in adult ([facility])   Assessment & Plan    Body mass index is 39.52 kg/m².     Lactic acidosis- (present on admission)   Assessment & Plan    Improved, stop serial checks     Abnormal LFTs   Assessment & Plan    Monitor labs          VTE prophylaxis: lovenox

## 2019-03-06 NOTE — RESPIRATORY CARE
Cardiopulmonary Resuscitation/Intubation Assist    Intubation assist performed yes  Reason for intubation respiratory failure   Positive Color Change on EZCap? Yes  EtCO2 mmHg:   Difficult Intubation/Number of attempts no/1   Evidence of aspiration no  Airway ETT Oral 7.5-Secured At  (cm): 21 (03/05/19 2203)  Rivas Vent Mode: APVCMV (03/05/19 2203)     Rate (breaths/min): 22 (03/05/19 2203)  Vt Target (mL): 370 (03/05/19 2203)  FiO2: 100 (03/05/19 2203)  PEEP/CPAP: 12 (03/05/19 2203)       Events/Summary/Plan: Intubated pt due to respiratory failure (03/05/19 2203)

## 2019-03-06 NOTE — PROGRESS NOTES
"Pharmacy Kinetics 63 y.o. female on vancomycin day # 1 3/6/2019    Currently on Vancomycin 3000 mg iv loading dose on  at 2330    Indication for Treatment: Sepsis due to unknown infection     Pertinent history per medical record: Admitted on 2019 for sepsis due to UTI.  Patient was on Rocephin for UTI but became febrile, tachypneic and tachycardic.  They were emergently intubated and antibiotics escalated.      Other antibiotics: Zosyn 4.5g extended infusion     Allergies: Hydromorphone and Tramadol     List concerns for renal function: lactic acidosis, BMI 40, pressors    Pertinent cultures to date:     Hx of e coli in urine    MRSA nares swab if pneumonia is a concern (ordered/positive/negative/n-a): ordered    Recent Labs      19   0300  19   0204  19   2130  19   0209   WBC  7.5  5.0  8.1  6.6   NEUTSPOLYS  70.40  70.40  61.10  58.80   BANDSSTABS  2.60   --   7.10  3.50     Recent Labs      19   0300  19   0204  19   2130   BUN  13  14  15   CREATININE  0.69  0.75  0.79   ALBUMIN   --    --   2.7*     No results for input(s): VANCOTROUGH, VANCOPEAK, VANCORANDOM in the last 72 hours.  Intake/Output Summary (Last 24 hours) at 19 0321  Last data filed at 19 0205   Gross per 24 hour   Intake          3628.12 ml   Output             1400 ml   Net          2228.12 ml      Blood pressure (!) 91/57, pulse (!) 141, temperature 37.8 °C (100.1 °F), temperature source Axillary, resp. rate 19, height 1.727 m (5' 7.99\"), weight 121 kg (266 lb 12.1 oz), SpO2 98 %, not currently breastfeeding. Temp (24hrs), Av °C (100.4 °F), Min:37.4 °C (99.3 °F), Max:39.2 °C (102.5 °F)      A/P   1. Vancomycin dose change: New start  2. Next vancomycin level:  at 1800  3. Goal trough: 12-16 mcg/mL  4. Comments: Patient has multiple risk factors for accumulation.  Will start pulse dosing and order an 18 hour level post loading dose.  Will aim for a higher trough goal until " a source can be confirmed.  Pharmacy will continue to follow.    Ronn Salazar, PharmD

## 2019-03-06 NOTE — PROCEDURES
Date of Procedure:  3/5/2019    Title of Procedure:  Diagnostic and therapeutic flexible fiberoptic bronchoscopy with bronchoalveolar lavage    Indication for Procedure:   Atelectasis    Post-procedure Diagnoses:    1.  Normal endobronchial anatomy  2.  No endobronchial tumor identified  3.  Moderate amount of thick, yellow secretions seen bilaterally.  Occlusion of the right lower lobe and left lower lobe with secretions.  All secretions suctioned until clear.    Narrative:    The patient was sedated, intubated and ventilated at the time of this procedure.  The flexible fiberoptic bronchoscope was inserted through the lumen of the endotracheal tube and advanced into the distal trachea without difficulty.  The airways were examined to the subsegmental bronchus level bilaterally.    The endobronchial anatomy was normal.  No tumor was identified.    There was a moderate amount of thick, tenacious, juicy, yellow secretions seen bilaterally.  There was occlusion of the right lower lobe and left lower lobe airways with these secretions.  All the secretions were suctioned until clear.    Bronchoalveolar lavage was carried out in the basilar segments of the right lower lobe using the standard technique with good fluid return.    Bronchoalveolar lavage fluid from the right lower lobe is submitted to the laboratory for cytology, gram stain, culture and sensitivity, acid fast bacilli smear and culture and fungal culture.    The patient tolerated the procedure quite nicely.  No complications were apparent.  The heart rate and rhythm, blood pressure and oxygenation saturation were continuously monitored.      Fabricio Ivey MD  Pulmonary and Critical Care Medicine

## 2019-03-06 NOTE — PROGRESS NOTES
Critical Care Progress Note    Date of admission  2/28/2019    Chief Complaint  63 y.o. female admitted 2/28/2019 with abdominal pain.    Hospital Course    This lady was admitted to the ICU with septic shock due to a UTI.      Interval Problem Update  Reviewed last 24 hour events:      Intubated last night  BM last night  AF - rate 140-150  Westley at 60  Vent 2  OG to LIS - 200 out last night  LR 83  Amio 0.5  Prop 20  fent 50  PEEP 12   40%  Decrease PEEP to 10  Vanco and Zosyn started last night  Load with dig      Review of Systems  Review of Systems   Unable to perform ROS: Acuity of condition        Vital Signs for last 24 hours   Temp:  [37.4 °C (99.3 °F)-39.2 °C (102.5 °F)] 37.8 °C (100.1 °F)  Pulse:  [] 140  Resp:  [12-78] 26  SpO2:  [89 %-100 %] 95 %    Hemodynamic parameters for last 24 hours       Respiratory Information for the last 24 hours  Rivas Vent Mode: APVCMV  Rate (breaths/min): 22  Vt Target (mL): 370  PEEP/CPAP: 370  FiO2: 40  P MEAN: 15  Control VTE (exp VT): 499    Physical Exam   Physical Exam   Constitutional:   On ventilator   HENT:   Head: Normocephalic.   Right Ear: External ear normal.   Left Ear: External ear normal.   Mouth/Throat: Oropharynx is clear and moist.   Eyes: Pupils are equal, round, and reactive to light. Conjunctivae are normal. Right eye exhibits no discharge. Left eye exhibits no discharge.   Neck: Neck supple. No JVD present. No tracheal deviation present.   Cardiovascular: Intact distal pulses.  Exam reveals no gallop.    Atrial fibrillation with rapid ventricular response   Pulmonary/Chest: She has no wheezes. She has rales (Improved crackles).   Abdominal: She exhibits distension. There is no rebound and no guarding.   Obese.  Mildly tender.  No peritoneal signs.   Musculoskeletal: She exhibits no tenderness.   No clubbing or cyanosis   Neurological:   Sedated.  Arouses.  Nods.  Moves all 4 extremities.   Skin: Skin is warm and dry. She is not diaphoretic. No  erythema.       Medications  Current Facility-Administered Medications   Medication Dose Route Frequency Provider Last Rate Last Dose   • metoprolol (LOPRESSOR) tablet 25 mg  25 mg Enteral Tube TWICE DAILY Zhou Longo M.D.   25 mg at 03/05/19 1714   • fentaNYL (SUBLIMAZE) 50 mcg/mL in 50mL (Continuous Infusion)   Intravenous Continuous Fabricio Ivey M.D. 0.5 mL/hr at 03/06/19 0201 25 mcg/hr at 03/06/19 0201   • propofol (DIPRIVAN) injection  0-80 mcg/kg/min Intravenous Continuous Fabricio Ivey M.D. 10.6 mL/hr at 03/06/19 0400 15 mcg/kg/min at 03/06/19 0400   • fentaNYL (SUBLIMAZE) injection 25 mcg  25 mcg Intravenous Q HOUR PRN Fabricio Ivey M.D.        Or   • fentaNYL (SUBLIMAZE) injection 50 mcg  50 mcg Intravenous Q HOUR PRN Fabricio Ivey M.D.        Or   • fentaNYL (SUBLIMAZE) injection 100 mcg  100 mcg Intravenous Q HOUR PRN Fabricio Ivey M.D.   100 mcg at 03/05/19 2153   • Respiratory Care per Protocol   Nebulization Continuous RT Fabricio Ivey M.D.       • senna-docusate (PERICOLACE or SENOKOT S) 8.6-50 MG per tablet 2 Tab  2 Tab Oral BID Fabricio Ivey M.D.   Stopped at 03/05/19 2215    And   • polyethylene glycol/lytes (MIRALAX) PACKET 1 Packet  1 Packet Oral QDAY PRN Fabricio Ivey M.D.        And   • magnesium hydroxide (MILK OF MAGNESIA) suspension 30 mL  30 mL Oral QDAY PRN Fabricio Ivey M.D.        And   • bisacodyl (DULCOLAX) suppository 10 mg  10 mg Rectal QDAY PRN Fabricio Ivey M.D.       • MD Alert...ICU Electrolyte Replacement per Pharmacy   Other PHARMACY TO DOSE Fabricio Ivey M.D.       • ipratropium-albuterol (DUONEB) nebulizer solution  3 mL Nebulization Q2HRS PRN (RT) Fabricio Ivey M.D.       • piperacillin-tazobactam (ZOSYN) 4.5 g in  mL IVPB  4.5 g Intravenous Q8HRS Fabricio Ivey M.D. 25 mL/hr at 03/06/19 0238 4.5 g at 03/06/19 0238   • MD Alert...Vancomycin per  Pharmacy   Other PHARMACY TO DOSE Fabricio Ivey M.D.       • vasopressin (VASOSTRICT) 20 Units in  mL Infusion  0.03 Units/min Intravenous Continuous Fabricio Ievy M.D.   Stopped at 03/05/19 2230   • amiodarone (CORDARONE) 450 mg in D5W 250 mL Infusion  0.5-1 mg/min Intravenous Continuous Fabricio Ivey M.D. 17 mL/hr at 03/05/19 1327 0.5 mg/min at 03/05/19 1327   • phenylephrine (STIVEN-SYNEPHRINE) 40,000 mcg in  mL Infusion  0-300 mcg/min Intravenous Continuous Fabricio Ivey M.D. 37.5 mL/hr at 03/06/19 0400 100 mcg/min at 03/06/19 0400   • ketorolac (TORADOL) injection 15 mg  15 mg Intravenous Q6HRS PRN Fabricio Shipley D.O.   15 mg at 03/04/19 1152   • gabapentin (NEURONTIN) capsule 300 mg  300 mg Enteral Tube TID Fabricio Shipley D.O.   300 mg at 03/05/19 1715   • omeprazole 2 mg/mL in sodium bicarbonate (PRILOSEC) oral susp 40 mg  40 mg Enteral Tube DAILY Fabricio Shipley D.O.   40 mg at 03/05/19 0515   • acetaminophen (TYLENOL) tablet 650 mg  650 mg Enteral Tube Q4HRS PRN KWAKU Granger.O.   650 mg at 03/1955   • aspirin (ASA) chewable tab 324 mg  324 mg Enteral Tube DAILY Fabricio Shipley D.O.   324 mg at 03/05/19 0515   • atorvastatin (LIPITOR) tablet 40 mg  40 mg Enteral Tube Q EVENING KWAKU Granger.O.   40 mg at 03/05/19 1715   • amitriptyline (ELAVIL) tablet 300 mg  300 mg Enteral Tube Nightly Fabricio Shipley D.O.   300 mg at 03/05/19 2000   • levothyroxine (SYNTHROID) tablet 150 mcg  150 mcg Enteral Tube AM ES KWAKU Granger.O.   150 mcg at 03/05/19 0516   • tizanidine (ZANAFLEX) tablet 2 mg  2 mg Enteral Tube Q8HRS PRN Fabricio Shipley D.O.       • enoxaparin (LOVENOX) inj 40 mg  40 mg Subcutaneous DAILY Fabricio Ivey M.D.   40 mg at 03/05/19 0515   • lactated ringers infusion   Intravenous Continuous Ace Patiño M.D. 83 mL/hr at 03/06/19 0234     • ondansetron (ZOFRAN) syringe/vial injection 4 mg  4 mg Intravenous Q4HRS PRN Kareem OCASIO  FAWN [employer]   4 mg at 03/01/19 0137   • ondansetron (ZOFRAN ODT) dispertab 4 mg  4 mg Oral Q4HRS PRSUHA Solis M.D.       • promethazine (PHENERGAN) tablet 12.5-25 mg  12.5-25 mg Oral Q4HRS PRSUHA Solis M.D.       • promethazine (PHENERGAN) suppository 12.5-25 mg  12.5-25 mg Rectal Q4HRS PRSUHA Solis M.D.       • prochlorperazine (COMPAZINE) injection 5-10 mg  5-10 mg Intravenous Q4HRS PRSUHA Solis M.D.       • insulin regular (HUMULIN R) injection 1-6 Units  1-6 Units Subcutaneous Q6HRS Kareem Solis M.D.   1 Units at 03/06/19 0118    And   • glucose 4 g chewable tablet 16 g  16 g Oral Q15 MIN PRSUHA Solis M.D.        And   • dextrose 50% (D50W) injection 25 mL  25 mL Intravenous Q15 MIN PRSUHA Solis M.D.           Fluids    Intake/Output Summary (Last 24 hours) at 03/06/19 0501  Last data filed at 03/06/19 0446   Gross per 24 hour   Intake          4081.75 ml   Output             1490 ml   Net          2591.75 ml       Laboratory  Recent Labs      03/04/19   1251  03/06/19   0044  03/06/19   0417   ISTATAPH  7.400  7.432  7.444   ISTATAPCO2  40.7*  43.6*  42.0*   ISTATAPO2  67  241*  168*   ISTATATCO2  26  30  30   [id number]  93  100*  100*   ISTATARTHCO3  25.2*  29.1*  28.8*   ISTATARTBE  0  4*  4*   ISTATTEMP  99.2 F  100.1 F  see below   ISTATFIO2  52  100  60   ISTATSPEC  Arterial  Arterial  Arterial   ISTATAPHTC  7.395*  7.420   --    SJTRNAQG2PL  69  245*   --      Recent Labs      03/05/19   0204  03/05/19   0755   TROPONINI  0.01  0.01     Recent Labs      03/04/19   0300  03/05/19   0204  03/05/19   1315  03/05/19 2130   SODIUM  143  141   --   145   POTASSIUM  3.4*  3.2*  4.0  4.3   CHLORIDE  107  105   --   109   CO2  28  28   --   25   BUN  13  14   --   15   CREATININE  0.69  0.75   --   0.79   MAGNESIUM  1.6  1.8   --    --    CALCIUM  7.4*  7.8*   --   7.7*     Recent Labs      03/04/19   0300  03/05/19   0204  03/05/19 2130   ALTSGPT    --    --   11   ASTSGOT   --    --   27   ALKPHOSPHAT   --    --   90   TBILIRUBIN   --    --   0.7   GLUCOSE  165*  184*  222*     Recent Labs      03/05/19 0204 03/05/19 2130 03/06/19 0209   WBC  5.0  8.1  6.6   NEUTSPOLYS  70.40  61.10  58.80   LYMPHOCYTES  20.90*  26.50  31.60   MONOCYTES  3.50  4.40  6.10   EOSINOPHILS  2.60  0.00  0.00   BASOPHILS  0.90  0.00  0.00   ASTSGOT   --   27   --    ALTSGPT   --   11   --    ALKPHOSPHAT   --   90   --    TBILIRUBIN   --   0.7   --      Recent Labs      03/05/19 0204 03/05/19 2130 03/06/19 0209   RBC  3.39*  3.87*  3.37*   HEMOGLOBIN  9.8*  11.0*  9.6*   HEMATOCRIT  30.3*  34.3*  30.1*   PLATELETCT  180  296  249       Imaging  X-Ray:  I have personally reviewed the images and compared with prior images. and My impression is: Improved bilateral opacities    Assessment/Plan  * Septic shock (HCC)- (present on admission)   Assessment & Plan    Severe sepsis with associated acute encephalopathy and acute kidney injury  Source not entirely clear  Lactic acid improved  Urinalysis with decreased pyuria  Continue empiric Zosyn and vancomycin  Titrating vasopressors to keep mean arterial pressure greater than 65     Acute hypoxemic respiratory failure (HCC)   Assessment & Plan    Intubated on 3/5  Appropriate ventilator bundles are in place  Continue full vent support  Decrease PEEP     Atrial fibrillation and flutter (HCC)   Assessment & Plan    New onset atrial flutter/fibrillation  Now with rapid ventricular response  TSH normal  Echocardiogram unremarkable  Optimize potassium and magnesium  Continue amiodarone drip  Load with digoxin  IV metoprolol as necessary for tachycardia     Abdominal distention   Assessment & Plan    CT of abdomen/pelvis reveals dilated cecum and right colon  Query obstruction versus pseudoobstruction versus ileus  Bowel rest  Surgery is following     Urinary tract infection- (present on admission)   Assessment & Plan    Urine  culture with E. coli  Continue Zosyn     Acute encephalopathy   Assessment & Plan    Suspect due to sepsis and metabolic  MRI without evidence of acute stroke  Ammonia level normal  Follow neuro exam  Limit sedatives     JOSE (acute kidney injury) ([facility])   Assessment & Plan    Resolved  Avoid nephrotoxins     DM (diabetes mellitus) ([facility])- (present on admission)   Assessment & Plan    Continue sliding scale insulin     Depression- (present on admission)   Assessment & Plan    Stop amitriptyline     Hypomagnesemia   Assessment & Plan    Replete magnesium     Hypokalemia   Assessment & Plan    Replete potassium          VTE:  Lovenox  Ulcer: PPI  Lines: Central Line  Ongoing indication addressed and [place] Catheter  Ongoing indication addressed    I have performed a physical exam and reviewed and updated ROS and Plan today (3/6/2019). In review of yesterday's note (3/5/2019), there are no changes except as documented above.    I have assessed and reassessed her respiratory status with ventilator adjustments, airway mechanics, ventilator waveforms, blood pressure, hemodynamics, cardiovascular status with titration of phenylephrine and neurologic status with titration of propofol.  She is at increased risk for worsening respiratory, cardiovascular, gastrointestinal and CNS system dysfunction.    Discussed patient condition and risk of morbidity and/or mortality with Hospitalist, RN, RT, Pharmacy, Charge nurse / hot rounds and QA team     The patient remains critically ill.  Critical care time = 35 minutes minutes in directly providing and coordinating critical care and extensive data review.  No time overlap and excludes procedures.    Fabricio Ivey MD  Pulmonary and Critical Care Medicine

## 2019-03-06 NOTE — PROGRESS NOTES
· 2 RN skin check complete with charge nurse Janet  · Devices in place: pulse ox, BP cuff, cardiac monitor leads, cortrak, ETT, central line, peripheral IV, SCDs, hernandez catheter, bilateral soft wrist restraints.  · Skin assessed under devices: blister on buttock and bruising right belly.  · The following interventions in place: Q2H turns and repositioning, rotate ETT, pulse ox. Heels floated on pillows. Assess under restraints Q2H. Ensure patient is not laying on tubing/lines. Mepilex to sacrum.

## 2019-03-06 NOTE — CARE PLAN
Problem: Ventilation Defect:  Goal: Ability to achieve and maintain unassisted ventilation or tolerate decreased levels of ventilator support    Intervention: Support and monitor invasive and noninvasive mechanical ventilation  Adult Ventilation Update    Total Vent Days: 2  22  370  +10  40%    Patient Lines/Drains/Airways Status    Active Airway     Name: Placement date: Placement time: Site: Days:    Airway ETT Oral 7.5 03/05/19   2145   Oral                 Sputum/Suction   Cough: Productive (03/06/19 1357)  Sputum Amount: Small (03/06/19 1357)  Sputum Color: Clear;White (03/06/19 1357)  Sputum Consistency: Thick;Thin (03/06/19 1357)      Events/Summary/Plan: PEEP decreased to 10. Per Dr. Ivey holding SBT's at this time until surgeon consult.

## 2019-03-06 NOTE — DIETARY
Nutrition Services: Update  Patient intubated since initial RD assessment. TF currently not running at bedside.  Per discussion with team during rounds, patient with worsening distention and abdominal pain - TF and oral meds held and surgery has been consulted. Propofol currently ordered and infusing @ 21.4 ml/hr providing 565 kcal per day.  Additionally patient receiving doris-synephrine @ 80 mcg/min.  Will adjust TF goals at this time to account for calories from propofol.  Will be unable to strictly hypocalorically feed patient with rate of propofol.     Calculation/Equation: MSJ x 1 = 1730 kcal. 65 - 70% = 1125 - 1260. Based on wt of 113 kg (suspected dry wt)  Total Calories / day: 1243 - 1582 (Calories / k - 14)  Total Grams Protein / day: 127 - 170 (Grams Protein / ABW k.2 - 1.5; 1.5 - 2 IBW = 95 - 127g)     Plan/Recommend:  1. Start TF per MD when medically appropriate.   2. TF goal with propofol is: Peptamen Intense VHP with goal rate of  60 ml/hr which will provide: 1440 kcal (+kcal from propofol), 132 grams of protein (1.2 g/kg suspected dry weight), and 1210 ml free water per day.  3. Once propofol is discontinued increase TF with Peptamen Intense VHP to final goal rate of 75 ml/hr which provides: 1800 kcal, 166 g of protein and 1512 ml free water.   4. Fluids per MD.   5. Will order metabolic cart study to better assess patients nutrition needs.     RD monitoring

## 2019-03-06 NOTE — CARE PLAN
Problem: Nutritional:  Goal: Nutrition support tolerated and meeting greater than 85% of estimated needs  Outcome: NOT MET  See dietary note.

## 2019-03-06 NOTE — PROGRESS NOTES
Consulted with CT department, planned time for CT abd/pelv w/wo contrast for 20:00/20:300 this evening due to scheduling conflict. Donta BLAIR notified

## 2019-03-06 NOTE — PROCEDURES
Date of service:  3/5/2019    Title:  Emergent endotracheal intubation    Indication:  Respiratory failure    Narrative:    The patient was sedated and paralyzed with 20 mg of IV etomidate and 140 mg of IV succinylcholine.  A 7.5 [place] endotracheal tube was placed directly into the trachea using a #4 Glidescope without difficulty or apparent complication.  The CO2 detector made the appropriate color change, bilateral symmetrical breath sounds are present and fogging is present in the endotracheal tube.  All of this confirms that the endotracheal tube is indeed in the patient's trachea.  The patient tolerated the procedure quite nicely.  No complications are apparent.      Fabricio Ivey MD  Pulmonary and Critical Care Medicine

## 2019-03-07 ENCOUNTER — APPOINTMENT (OUTPATIENT)
Dept: RADIOLOGY | Facility: MEDICAL CENTER | Age: 64
DRG: 871 | End: 2019-03-07
Attending: INTERNAL MEDICINE
Payer: MEDICARE

## 2019-03-07 LAB
ACTION RANGE TRIGGERED IACRT: NO
ANION GAP SERPL CALC-SCNC: 8 MMOL/L (ref 0–11.9)
ANISOCYTOSIS BLD QL SMEAR: ABNORMAL
BASE EXCESS BLDA CALC-SCNC: 0 MMOL/L (ref -4–3)
BASOPHILS # BLD AUTO: 2.6 % (ref 0–1.8)
BASOPHILS # BLD: 0.27 K/UL (ref 0–0.12)
BODY TEMPERATURE: ABNORMAL DEGREES
BUN SERPL-MCNC: 18 MG/DL (ref 8–22)
CALCIUM SERPL-MCNC: 7.7 MG/DL (ref 8.5–10.5)
CHLORIDE SERPL-SCNC: 109 MMOL/L (ref 96–112)
CO2 BLDA-SCNC: 26 MMOL/L (ref 20–33)
CO2 SERPL-SCNC: 27 MMOL/L (ref 20–33)
CREAT SERPL-MCNC: 1.38 MG/DL (ref 0.5–1.4)
EOSINOPHIL # BLD AUTO: 0.27 K/UL (ref 0–0.51)
EOSINOPHIL NFR BLD: 2.6 % (ref 0–6.9)
ERYTHROCYTE [DISTWIDTH] IN BLOOD BY AUTOMATED COUNT: 51.2 FL (ref 35.9–50)
GLUCOSE BLD-MCNC: 153 MG/DL (ref 65–99)
GLUCOSE BLD-MCNC: 160 MG/DL (ref 65–99)
GLUCOSE BLD-MCNC: 161 MG/DL (ref 65–99)
GLUCOSE BLD-MCNC: 165 MG/DL (ref 65–99)
GLUCOSE BLD-MCNC: 79 MG/DL (ref 65–99)
GLUCOSE SERPL-MCNC: 160 MG/DL (ref 65–99)
HCO3 BLDA-SCNC: 24.3 MMOL/L (ref 17–25)
HCT VFR BLD AUTO: 29.9 % (ref 37–47)
HGB BLD-MCNC: 9.4 G/DL (ref 12–16)
HOROWITZ INDEX BLDA+IHG-RTO: 230 MM[HG]
HYPOCHROMIA BLD QL SMEAR: ABNORMAL
INST. QUALIFIED PATIENT IIQPT: YES
LYMPHOCYTES # BLD AUTO: 2.13 K/UL (ref 1–4.8)
LYMPHOCYTES NFR BLD: 20.7 % (ref 22–41)
MACROCYTES BLD QL SMEAR: ABNORMAL
MAGNESIUM SERPL-MCNC: 2 MG/DL (ref 1.5–2.5)
MANUAL DIFF BLD: ABNORMAL
MCH RBC QN AUTO: 28.9 PG (ref 27–33)
MCHC RBC AUTO-ENTMCNC: 31.4 G/DL (ref 33.6–35)
MCV RBC AUTO: 92 FL (ref 81.4–97.8)
METAMYELOCYTES NFR BLD MANUAL: 0.9 %
MICROCYTES BLD QL SMEAR: ABNORMAL
MONOCYTES # BLD AUTO: 0.71 K/UL (ref 0–0.85)
MONOCYTES NFR BLD AUTO: 6.9 % (ref 0–13.4)
MORPHOLOGY BLD-IMP: NORMAL
NEUTROPHILS # BLD AUTO: 6.83 K/UL (ref 2–7.15)
NEUTROPHILS NFR BLD: 51.7 % (ref 44–72)
NEUTS BAND NFR BLD MANUAL: 14.6 % (ref 0–10)
NRBC # BLD AUTO: 0.03 K/UL
NRBC BLD-RTO: 0.3 /100 WBC
O2/TOTAL GAS SETTING VFR VENT: 40 %
PCO2 BLDA: 38.8 MMHG (ref 26–37)
PCO2 TEMP ADJ BLDA: 40.8 MMHG (ref 26–37)
PH BLDA: 7.41 [PH] (ref 7.4–7.5)
PH TEMP ADJ BLDA: 7.39 [PH] (ref 7.4–7.5)
PLATELET # BLD AUTO: 311 K/UL (ref 164–446)
PLATELET BLD QL SMEAR: NORMAL
PMV BLD AUTO: 10 FL (ref 9–12.9)
PO2 BLDA: 92 MMHG (ref 64–87)
PO2 TEMP ADJ BLDA: 99 MMHG (ref 64–87)
POLYCHROMASIA BLD QL SMEAR: NORMAL
POTASSIUM SERPL-SCNC: 3.6 MMOL/L (ref 3.6–5.5)
RBC # BLD AUTO: 3.25 M/UL (ref 4.2–5.4)
RBC BLD AUTO: PRESENT
SAO2 % BLDA: 97 % (ref 93–99)
SODIUM SERPL-SCNC: 144 MMOL/L (ref 135–145)
SPECIMEN DRAWN FROM PATIENT: ABNORMAL
TRIGL SERPL-MCNC: 231 MG/DL (ref 0–149)
VANCOMYCIN SERPL-MCNC: 12.3 UG/ML
WBC # BLD AUTO: 10.3 K/UL (ref 4.8–10.8)

## 2019-03-07 PROCEDURE — 700102 HCHG RX REV CODE 250 W/ 637 OVERRIDE(OP): Performed by: HOSPITALIST

## 2019-03-07 PROCEDURE — 36600 WITHDRAWAL OF ARTERIAL BLOOD: CPT

## 2019-03-07 PROCEDURE — 302154 K THERMIA BLANKET 24X60: Performed by: INTERNAL MEDICINE

## 2019-03-07 PROCEDURE — 84478 ASSAY OF TRIGLYCERIDES: CPT

## 2019-03-07 PROCEDURE — 770022 HCHG ROOM/CARE - ICU (200)

## 2019-03-07 PROCEDURE — 85007 BL SMEAR W/DIFF WBC COUNT: CPT

## 2019-03-07 PROCEDURE — 85027 COMPLETE CBC AUTOMATED: CPT

## 2019-03-07 PROCEDURE — 82962 GLUCOSE BLOOD TEST: CPT | Mod: 91

## 2019-03-07 PROCEDURE — 94003 VENT MGMT INPAT SUBQ DAY: CPT

## 2019-03-07 PROCEDURE — 80202 ASSAY OF VANCOMYCIN: CPT

## 2019-03-07 PROCEDURE — 700101 HCHG RX REV CODE 250: Performed by: INTERNAL MEDICINE

## 2019-03-07 PROCEDURE — 99291 CRITICAL CARE FIRST HOUR: CPT | Mod: 25 | Performed by: INTERNAL MEDICINE

## 2019-03-07 PROCEDURE — 302132 K THERMIA MOTOR: Performed by: INTERNAL MEDICINE

## 2019-03-07 PROCEDURE — 700105 HCHG RX REV CODE 258: Performed by: INTERNAL MEDICINE

## 2019-03-07 PROCEDURE — 83735 ASSAY OF MAGNESIUM: CPT

## 2019-03-07 PROCEDURE — 700111 HCHG RX REV CODE 636 W/ 250 OVERRIDE (IP): Performed by: INTERNAL MEDICINE

## 2019-03-07 PROCEDURE — 74018 RADEX ABDOMEN 1 VIEW: CPT

## 2019-03-07 PROCEDURE — A9270 NON-COVERED ITEM OR SERVICE: HCPCS | Performed by: HOSPITALIST

## 2019-03-07 PROCEDURE — 71045 X-RAY EXAM CHEST 1 VIEW: CPT

## 2019-03-07 PROCEDURE — 306802 CATHETER,INSTAFLOW BOWEL: Performed by: INTERNAL MEDICINE

## 2019-03-07 PROCEDURE — 82803 BLOOD GASES ANY COMBINATION: CPT

## 2019-03-07 PROCEDURE — 80048 BASIC METABOLIC PNL TOTAL CA: CPT

## 2019-03-07 RX ORDER — NEOSTIGMINE METHYLSULFATE 1 MG/ML
2 INJECTION, SOLUTION INTRAVENOUS ONCE
Status: COMPLETED | OUTPATIENT
Start: 2019-03-07 | End: 2019-03-07

## 2019-03-07 RX ORDER — NEOSTIGMINE METHYLSULFATE 1 MG/ML
0.5 INJECTION, SOLUTION INTRAVENOUS ONCE
Status: CANCELLED | OUTPATIENT
Start: 2019-03-07 | End: 2019-03-07

## 2019-03-07 RX ORDER — POTASSIUM CHLORIDE 14.9 MG/ML
20 INJECTION INTRAVENOUS ONCE
Status: COMPLETED | OUTPATIENT
Start: 2019-03-07 | End: 2019-03-07

## 2019-03-07 RX ADMIN — NEOSTIGMINE METHYLSULFATE 2 MG: 1 INJECTION INTRAVENOUS at 13:54

## 2019-03-07 RX ADMIN — PHENYLEPHRINE HYDROCHLORIDE 100 MCG/MIN: 10 INJECTION INTRAVENOUS at 15:33

## 2019-03-07 RX ADMIN — ACETAMINOPHEN 650 MG: 325 TABLET, FILM COATED ORAL at 21:24

## 2019-03-07 RX ADMIN — PHENYLEPHRINE HYDROCHLORIDE 100 MCG/MIN: 10 INJECTION INTRAVENOUS at 22:45

## 2019-03-07 RX ADMIN — FENTANYL CITRATE 75 MCG/HR: 50 INJECTION, SOLUTION INTRAMUSCULAR; INTRAVENOUS at 04:16

## 2019-03-07 RX ADMIN — DEXMEDETOMIDINE HYDROCHLORIDE 1.5 MCG/KG/HR: 100 INJECTION, SOLUTION INTRAVENOUS at 10:21

## 2019-03-07 RX ADMIN — DEXMEDETOMIDINE HYDROCHLORIDE 1.5 MCG/KG/HR: 100 INJECTION, SOLUTION INTRAVENOUS at 07:34

## 2019-03-07 RX ADMIN — DEXMEDETOMIDINE HYDROCHLORIDE 1.5 MCG/KG/HR: 100 INJECTION, SOLUTION INTRAVENOUS at 15:05

## 2019-03-07 RX ADMIN — DEXMEDETOMIDINE HYDROCHLORIDE 1.5 MCG/KG/HR: 100 INJECTION, SOLUTION INTRAVENOUS at 22:45

## 2019-03-07 RX ADMIN — DEXMEDETOMIDINE HYDROCHLORIDE 1.5 MCG/KG/HR: 100 INJECTION, SOLUTION INTRAVENOUS at 12:38

## 2019-03-07 RX ADMIN — ACETAMINOPHEN 650 MG: 325 TABLET, FILM COATED ORAL at 01:46

## 2019-03-07 RX ADMIN — PIPERACILLIN AND TAZOBACTAM 4.5 G: 4; .5 INJECTION, POWDER, LYOPHILIZED, FOR SOLUTION INTRAVENOUS; PARENTERAL at 05:41

## 2019-03-07 RX ADMIN — FENTANYL CITRATE 100 MCG: 50 INJECTION INTRAMUSCULAR; INTRAVENOUS at 05:51

## 2019-03-07 RX ADMIN — FENTANYL CITRATE 150 MCG/HR: 50 INJECTION, SOLUTION INTRAMUSCULAR; INTRAVENOUS at 20:24

## 2019-03-07 RX ADMIN — DEXMEDETOMIDINE HYDROCHLORIDE 1.5 MCG/KG/HR: 100 INJECTION, SOLUTION INTRAVENOUS at 20:26

## 2019-03-07 RX ADMIN — INSULIN HUMAN 1 UNITS: 100 INJECTION, SOLUTION PARENTERAL at 00:07

## 2019-03-07 RX ADMIN — DIGOXIN 250 MCG: 0.25 INJECTION INTRAMUSCULAR; INTRAVENOUS at 05:42

## 2019-03-07 RX ADMIN — AMIODARONE HYDROCHLORIDE 0.5 MG/MIN: 50 INJECTION, SOLUTION INTRAVENOUS at 15:32

## 2019-03-07 RX ADMIN — PHENYLEPHRINE HYDROCHLORIDE 80 MCG/MIN: 10 INJECTION INTRAVENOUS at 05:40

## 2019-03-07 RX ADMIN — POTASSIUM CHLORIDE 20 MEQ: 14.9 INJECTION, SOLUTION INTRAVENOUS at 10:21

## 2019-03-07 RX ADMIN — DEXMEDETOMIDINE HYDROCHLORIDE 1.5 MCG/KG/HR: 100 INJECTION, SOLUTION INTRAVENOUS at 17:48

## 2019-03-07 RX ADMIN — PIPERACILLIN AND TAZOBACTAM 4.5 G: 4; .5 INJECTION, POWDER, LYOPHILIZED, FOR SOLUTION INTRAVENOUS; PARENTERAL at 13:00

## 2019-03-07 RX ADMIN — INSULIN HUMAN 1 UNITS: 100 INJECTION, SOLUTION PARENTERAL at 17:06

## 2019-03-07 RX ADMIN — DEXMEDETOMIDINE HYDROCHLORIDE 1.5 MCG/KG/HR: 100 INJECTION, SOLUTION INTRAVENOUS at 01:45

## 2019-03-07 RX ADMIN — ACETAMINOPHEN 650 MG: 325 TABLET, FILM COATED ORAL at 17:16

## 2019-03-07 RX ADMIN — FENTANYL CITRATE 50 MCG: 50 INJECTION, SOLUTION INTRAMUSCULAR; INTRAVENOUS at 12:44

## 2019-03-07 RX ADMIN — PIPERACILLIN AND TAZOBACTAM 4.5 G: 4; .5 INJECTION, POWDER, LYOPHILIZED, FOR SOLUTION INTRAVENOUS; PARENTERAL at 21:24

## 2019-03-07 RX ADMIN — DEXMEDETOMIDINE HYDROCHLORIDE 1.1 MCG/KG/HR: 100 INJECTION, SOLUTION INTRAVENOUS at 04:04

## 2019-03-07 ASSESSMENT — ENCOUNTER SYMPTOMS
ABDOMINAL PAIN: 1
SYNCOPE: 1
BACK PAIN: 1

## 2019-03-07 NOTE — PROGRESS NOTES
"Patient still awaiting neostigmine dose as ordered by MDs (Flexiseal system ordered by Magdalena BLAIR was being brought to bedside). Also still waiting on STAT portable KUB per Magdalena BLAIR/Robles Rodriguez MD (for baseline film to be compared with tomorrow AMs film). Radiology \"supervisor\" communicated need for patient to be transported down to radiology dept for film. In-house radiologist consulted and informed of patient's critical nature (mechanical ventilation, vasopressor therapy, continuous amiodarone drip, etc). Radiologist states he will communicate with radiology department technician//supervisors. Will continue to monitor at this time.  "

## 2019-03-07 NOTE — CARE PLAN
Problem: Ventilation Defect:  Goal: Ability to achieve and maintain unassisted ventilation or tolerate decreased levels of ventilator support    Intervention: Support and monitor invasive and noninvasive mechanical ventilation  Adult Ventilation Update    Total Vent Days: 3  22  390  +8  30%    Patient Lines/Drains/Airways Status    Active Airway     Name: Placement date: Placement time: Site: Days:    Airway ETT Oral 7.5 @ 21 03/05/19 2145   Oral               Sputum/Suction   Cough: Productive (03/07/19 1427)  Sputum Amount: Small (03/07/19 1427)  Sputum Color: Clear;White;Tan (03/07/19 1427)  Sputum Consistency: Thick;Thin (03/07/19 1427)      Events/Summary/Plan: PEEP decreased to 8. Continue to monitor

## 2019-03-07 NOTE — PROGRESS NOTES
· 2 RN skin check complete.  · Devices in place: pulse ox, BP cuff, cardiac monitor leads, cortrak, ETT, central line, peripheral IV, SCDs, hernandez catheter, bilateral soft wrist restraints.  · Skin tears bilateral buttocks  · Bruising right abdomen  · The following interventions in place: Q2H turns and repositioning, rotate ETT, pulse ox. Heels floated on pillows. Assess under restraints Q2H. Ensure patient is not laying on tubing/lines. Mepilex to sacrum.

## 2019-03-07 NOTE — PROGRESS NOTES
Critical Care Progress Note    Date of admission  2/28/2019    Chief Complaint  63 y.o. female admitted 2/28/2019 with abdominal pain.    Hospital Course    This lady was admitted to the ICU with septic shock due to a UTI.      Interval Problem Update  Reviewed last 24 hour events:      Nods  Moves all 4  Atrial flutter  Loaded with dig  Dex 1.5  Amio gtt  Fent 150  LR 83  Vent 3  PEEP 10 - decrease to 8  Replete K and Mg  Westley 80  Vanco and Zosyn  Stop Vanco  Neostigmine      Review of Systems  Review of Systems   Unable to perform ROS: Acuity of condition        Vital Signs for last 24 hours   Temp:  [37.4 °C (99.4 °F)-39.4 °C (103 °F)] 38.1 °C (100.6 °F)  Pulse:  [] 80  Resp:  [0-30] 21  SpO2:  [94 %-99 %] 99 %    Hemodynamic parameters for last 24 hours  CVP:  [4 MM HG-6 MM HG] 6 MM HG    Respiratory Information for the last 24 hours  Rivas Vent Mode: APVCMV  Rate (breaths/min): 22  Vt Target (mL): 390  PEEP/CPAP: 10  FiO2: 40  P MEAN: 12  Control VTE (exp VT): 438    Physical Exam   Physical Exam   Constitutional:   On ventilator   HENT:   Right Ear: External ear normal.   Left Ear: External ear normal.   Nose: Nose normal.   Mouth/Throat: No oropharyngeal exudate.   Eyes: Pupils are equal, round, and reactive to light. EOM are normal. Right eye exhibits no discharge. Left eye exhibits no discharge. No scleral icterus.   Neck: Neck supple. No tracheal deviation present.   Cardiovascular: Intact distal pulses.  Exam reveals no friction rub.    Atrial fibrillation   Pulmonary/Chest: She has no wheezes. She has rales (Scattered crackles at the bases).   Abdominal: She exhibits distension. There is no guarding.   Obese.  Mildly tender.  No peritoneal signs.   Musculoskeletal: She exhibits no deformity.   No clubbing or cyanosis   Neurological:   More alert.  Nods.  Moves all 4 extremities.   Skin: Skin is warm and dry. No rash noted. She is not diaphoretic.       Medications  Current Facility-Administered  Medications   Medication Dose Route Frequency Provider Last Rate Last Dose   • ondansetron (ZOFRAN ODT) dispertab 4 mg  4 mg Enteral Tube Q4HRS PRN Fabricio Ivey M.D.       • senna-docusate (PERICOLACE or SENOKOT S) 8.6-50 MG per tablet 2 Tab  2 Tab Enteral Tube BID Fabricio Ivey M.D.   Stopped at 03/06/19 1715    And   • polyethylene glycol/lytes (MIRALAX) PACKET 1 Packet  1 Packet Enteral Tube QDAY PRN Fabricio Ivey M.D.        And   • magnesium hydroxide (MILK OF MAGNESIA) suspension 30 mL  30 mL Enteral Tube QDAY PRN Fabricio Ivey M.D.        And   • bisacodyl (DULCOLAX) suppository 10 mg  10 mg Rectal QDAY PRN Fabricio Ivey M.D.       • promethazine (PHENERGAN) tablet 12.5-25 mg  12.5-25 mg Enteral Tube Q4HRS PRN Fabricio Ivey M.D.       • dexmedetomidine (PRECEDEX) 400 mcg/100mL NS premix infusion  0.1-1.5 mcg/kg/hr Intravenous Continuous Gabriel Kincaid M.D. 18.3 mL/hr at 03/07/19 0430 0.6 mcg/kg/hr at 03/07/19 0430   • digoxin (LANOXIN) injection 250 mcg  250 mcg Intravenous Q6HRS Fabricio Ivey M.D.   250 mcg at 03/06/19 2254   • metoprolol (LOPRESSOR) tablet 25 mg  25 mg Enteral Tube TWICE DAILY Zhou Longo M.D.   Stopped at 03/06/19 0600   • fentaNYL (SUBLIMAZE) 50 mcg/mL in 50mL (Continuous Infusion)   Intravenous Continuous Fabricio Ivey M.D. 1.5 mL/hr at 03/07/19 0416 75 mcg/hr at 03/07/19 0416   • propofol (DIPRIVAN) injection  0-80 mcg/kg/min Intravenous Continuous Fabricio Ivey M.D.   Stopped at 03/06/19 2028   • fentaNYL (SUBLIMAZE) injection 25 mcg  25 mcg Intravenous Q HOUR PRN Fabricio Ivey M.D.        Or   • fentaNYL (SUBLIMAZE) injection 50 mcg  50 mcg Intravenous Q HOUR PRN Fabricio Ivey M.D.        Or   • fentaNYL (SUBLIMAZE) injection 100 mcg  100 mcg Intravenous Q HOUR PRN Fabricio Ivey M.D.   100 mcg at 03/05/19 9833   • Respiratory Care per Protocol   Nebulization Continuous RT  Fabricio Ivey M.D.       • MD Alert...ICU Electrolyte Replacement per Pharmacy   Other PHARMACY TO DOSE Fabricio Ivey M.D.       • ipratropium-albuterol (DUONEB) nebulizer solution  3 mL Nebulization Q2HRS PRN (RT) Fabricio Ivey M.D.       • piperacillin-tazobactam (ZOSYN) 4.5 g in  mL IVPB  4.5 g Intravenous Q8HRS Fabricio Ivey M.D.   Stopped at 03/07/19 0212   • MD Alert...Vancomycin per Pharmacy   Other PHARMACY TO DOSE Fabricio Ivey M.D.       • vasopressin (VASOSTRICT) 20 Units in  mL Infusion  0.03 Units/min Intravenous Continuous Fabricio Ivey M.D.   Stopped at 03/05/19 2230   • amiodarone (CORDARONE) 450 mg in D5W 250 mL Infusion  0.5-1 mg/min Intravenous Continuous Fabricio Ivey M.D. 17 mL/hr at 03/06/19 2249 0.5 mg/min at 03/06/19 2249   • phenylephrine (STIVEN-SYNEPHRINE) 40,000 mcg in  mL Infusion  0-300 mcg/min Intravenous Continuous Fabricio Ivey M.D. 30 mL/hr at 03/07/19 0247 80 mcg/min at 03/07/19 0247   • gabapentin (NEURONTIN) capsule 300 mg  300 mg Enteral Tube TID KWAKU Granger.[email or web address]   Stopped at 03/06/19 0600   • omeprazole 2 mg/mL in sodium bicarbonate (PRILOSEC) oral susp 40 mg  40 mg Enteral Tube DAILY Fabricio Shipley D.O.   Stopped at 03/06/19 0600   • acetaminophen (TYLENOL) tablet 650 mg  650 mg Enteral Tube Q4HRS PRN KWAKU Granger.O.   650 mg at 03/07/19 0146   • aspirin (ASA) chewable tab 324 mg  324 mg Enteral Tube DAILY KWAKU Granger.O.   Stopped at 03/06/19 0600   • atorvastatin (LIPITOR) tablet 40 mg  40 mg Enteral Tube Q EVENING Fabricio Shipley D.O.   Stopped at 03/06/19 1715   • levothyroxine (SYNTHROID) tablet 150 mcg  150 mcg Enteral Tube AM ES Fabricio Shipley D.O.   Stopped at 03/06/19 0600   • enoxaparin (LOVENOX) inj 40 mg  40 mg Subcutaneous DAILY Fabricio Ivey M.D.   Stopped at 03/06/19 0541   • lactated ringers infusion   Intravenous Continuous Ace Patiño M.D. 83  mL/hr at 03/06/19 0545     • ondansetron (ZOFRAN) syringe/vial injection 4 mg  4 mg Intravenous Q4HRS PRSUHA Solis M.D.   4 mg at 03/06/19 2248   • promethazine (PHENERGAN) suppository 12.5-25 mg  12.5-25 mg Rectal Q4HRS PRSUHA Solis M.D.       • prochlorperazine (COMPAZINE) injection 5-10 mg  5-10 mg Intravenous Q4HRS PRSUHA Solis M.D.       • insulin regular (HUMULIN R) injection 1-6 Units  1-6 Units Subcutaneous Q6HRS Kareem Solis M.D.   1 Units at 03/07/19 0007    And   • dextrose 50% (D50W) injection 25 mL  25 mL Intravenous Q15 MIN PRSUHA Solis M.D.           Fluids    Intake/Output Summary (Last 24 hours) at 03/07/19 0506  Last data filed at 03/07/19 0400   Gross per 24 hour   Intake          4142.51 ml   Output             1730 ml   Net          2412.51 ml       Laboratory  Recent Labs      03/04/19   1251  03/06/19   0044  03/06/19   0417  03/07/19   0449   ISTATAPH  7.400  7.432  7.444  7.405   ISTATAPCO2  40.7*  43.6*  42.0*  38.8*   ISTATAPO2  67  241*  168*  92*   ISTATATCO2  26  30  30  26   SELMQOW7HRL  93  100*  100*  97   ISTATARTHCO3  25.2*  29.1*  28.8*  24.3   ISTATARTBE  0  4*  4*  0   ISTATTEMP  99.2 F  100.1 F  see below  100.6 F   ISTATFIO2  52  100  60  40   ISTATSPEC  Arterial  Arterial  Arterial  Arterial   ISTATAPHTC  7.395*  7.420   --   7.389*   [id number]  69  245*   --   99*     Recent Labs      03/05/19   0204  03/05/19   0755   TROPONINI  0.01  0.01     Recent Labs      03/05/19   0204  03/05/19   1315  03/05/19 2130 03/06/19 0209   SODIUM  141   --   145  148*   POTASSIUM  3.2*  4.0  4.3  3.8   CHLORIDE  105   --   109  110   CO2  28   --   25  29   BUN  14   --   15  16   CREATININE  0.75   --   0.79  0.90   MAGNESIUM  1.8   --    --   1.9   CALCIUM  7.8*   --   7.7*  8.1*     Recent Labs      03/05/19 0204 03/05/19 2130 03/06/19 0209   ALTSGPT   --   11   --    ASTSGOT   --   27   --    ALKPHOSPHAT   --   90   --     TBILIRUBIN   --   0.7   --    PREALBUMIN   --    --   <3.0*   GLUCOSE  184*  222*  149*     Recent Labs      03/05/19 0204 03/05/19 2130 03/06/19 0209   WBC  5.0  8.1  6.6   NEUTSPOLYS  70.40  61.10  58.80   LYMPHOCYTES  20.90*  26.50  31.60   MONOCYTES  3.50  4.40  6.10   EOSINOPHILS  2.60  0.00  0.00   BASOPHILS  0.90  0.00  0.00   ASTSGOT   --   27   --    ALTSGPT   --   11   --    ALKPHOSPHAT   --   90   --    TBILIRUBIN   --   0.7   --      Recent Labs      03/05/19 0204 03/05/19 2130 03/06/19 0209   RBC  3.39*  3.87*  3.37*   HEMOGLOBIN  9.8*  11.0*  9.6*   HEMATOCRIT  30.3*  34.3*  30.1*   PLATELETCT  180  296  249       Imaging  X-Ray:  I have personally reviewed the images and compared with prior images. and My impression is: Unchanged bilateral opacities    Assessment/Plan  * Septic shock (HCC)- (present on admission)   Assessment & Plan    Severe sepsis with associated acute encephalopathy and acute kidney injury  Source of sepsis is not clear  Titrating phenylephrine to maintain mean arterial pressure greater than 65  Blood cultures negative so far  Stop vancomycin  Continue Zosyn     Acute hypoxemic respiratory failure (HCC)   Assessment & Plan    Intubated on 3/5  Appropriate ventilator bundles are in place  Continue full vent support     Atrial fibrillation and flutter (HCC)   Assessment & Plan    New onset atrial flutter/fibrillation  Rate better controlled  Loaded with digoxin on 3/6  Continue amiodarone drip  Optimize magnesium and potassium  TSH normal  Echocardiogram unremarkable     Abdominal distention   Assessment & Plan    CT of abdomen/pelvis reveals dilated cecum and right colon  She has colonic pseudoobstruction  Neostigmine     Urinary tract infection- (present on admission)   Assessment & Plan    Urine culture with E. coli  Continue Zosyn     Acute encephalopathy   Assessment & Plan    Suspect due to sepsis and metabolic  MRI without evidence of acute stroke  Ammonia  level normal  Follow neuro exam  Limit sedatives     JOSE (acute kidney injury) (HCC)   Assessment & Plan    Avoid nephrotoxins  Resolved     DM (diabetes mellitus) (HCC)- (present on admission)   Assessment & Plan    Sliding scale insulin     Hypokalemia   Assessment & Plan    Replete potassium          VTE:  Lovenox  Ulcer: PPI  Lines: Central Line  Ongoing indication addressed and [place] Catheter  Ongoing indication addressed    I have performed a physical exam and reviewed and updated ROS and Plan today (3/7/2019). In review of yesterday's note (3/6/2019), there are no changes except as documented above.    I have assessed and reassessed her respiratory status with ventilator adjustments, ventilator waveforms, airway mechanics, blood pressure, hemodynamics, cardiovascular status with titration of phenylephrine an neurologic status with titration of propofol.  She is at increased risk for worsening respiratory, gastrointestinal, cardiovascular and CNS system dysfunction.    Discussed patient condition and risk of morbidity and/or mortality with Hospitalist, RN, RT, Pharmacy, Charge nurse / hot rounds and QA team     The patient remains critically ill.  Critical care time = 33 minutes minutes in directly providing and coordinating critical care and extensive data review.  No time overlap and excludes procedures.    Fabriico Ivey MD  Pulmonary and Critical Care Medicine

## 2019-03-07 NOTE — ASSESSMENT & PLAN NOTE
CT of abdomen/pelvis revealed dilated cecum and right colon  Colonic pseudoobstruction  Neostigmine on 3/7 and again on 3/8  Clinically improved  Increase activity  Avoid narcotics  Continue bowel regimen

## 2019-03-07 NOTE — CONSULTS
Gastroenterology Consult Note:    Huang Calhoun  Date & Time note created:    3/7/2019   9:46 AM     Referring MD:  Dr. Longo    Patient ID:   Name:             Sonya Segovia   YOB: 1955  Age:                 63 y.o.  female   MRN:               [id number]                                                             Reason for Consult:      Colonic pseudo obstruction    History of Present Illness:    Patient is a 63 year female with past medical history of hypothyroidism, type 2 diabetes mellitus, hypertension and depression who was admitted on 02/28 for sepsis thought to be secondary to UTI. Patient is currently intubated, history obtained from chart review and family at bedside.  Patient had presented with symptoms of  abdominal pain, nausea/vomiting and syncopal episodes. She had also reported constipation. CT scan in the ER on 02/28 had showed significant constipation without evidence of obstruction. Repeat CT scan with gastrografin later that day showed no evidence of bowel perforation. She had worsening lactic acidosis initially which has now resolved.   She appears to have had some improvement initially before developing worsening abdominal pain and distension again 2 days back. CT scan 03/06 shows dilated small and large bowel with cecum measuring 9.2 cm.   She has an NG tube to suction , last bowel movement this morning.    Review of Systems:      Cannot be obtained as patient is intubated      Past Medical History:   Past Medical History:   Diagnosis Date   • Arthritis     hands, knees   • Cancer (CMS-HCC) (HCC) 1982    cervical   • Dental disorder    • Diabetes (CMS-HCC) (HCC)    • Disorder of thyroid     goiter   • Heart burn    • Hypertension          Past Surgical History:  Past Surgical History:   Procedure Laterality Date   • LUMBAR LAMINECTOMY DISKECTOMY  10/20/2017    Procedure: LUMBAR 2 - 3 LAMINECTOMY;  Surgeon: Saud Mehta M.D.;  Location: SURGERY [facility];   Service: Neurosurgery   • FORAMINOTOMY Bilateral 10/20/2017    Procedure: BILATERAL LUMBAR 2-3 FORAMINOTOMY and BILATERAL LUMBAR 5 - SACRAL 1 LAMINOFORAMINOTOMY;  Surgeon: Saud Mehta M.D.;  Location: SURGERY [facility];  Service: Neurosurgery   • LUMBAR FUSION O-ARM  10/20/2017    Procedure: BILATERAL PEDICLE SCREW PLACEMENT at LUMBAR 2 - 3,  LEFT O-ARM INTRAOPERATIVE NAVIGATION,  AUTOGRAFT BONE PLACEMENT LUMBAR 2 - 3 at INTERTRANSVERSE PROCESS SPACE, SSEP, MEP NEUROMONITORING;  Surgeon: Saud Mehta M.D.;  Location: SURGERY [facility];  Service: Neurosurgery   • OTHER  2009    thyroidectomy   • CHOLECYSTECTOMY     • GYN SURGERY      hysterectomy   • OTHER ORTHOPEDIC SURGERY      amada shoulder ssurgery       Hospital Medications:    Current Facility-Administered Medications:   •  phenylephrine (STIVEN-SYNEPHRINE) 40,000 mcg in D5W 250 mL Infusion, 0-300 mcg/min, Intravenous, Continuous, Fabricio Ivey M.D., Last Rate: 30 mL/hr at 03/07/19 0540, 80 mcg/min at 03/07/19 0540  •  potassium chloride in water (KCL) ivpb **Administer in ICU only** 20 mEq, 20 mEq, Intravenous, Once, Fabricio Ivey M.D.  •  ondansetron (ZOFRAN ODT) dispertab 4 mg, 4 mg, Enteral Tube, Q4HRS PRN, Fabricio Ivey M.D.  •  senna-docusate (PERICOLACE or SENOKOT S) 8.6-50 MG per tablet 2 Tab, 2 Tab, Enteral Tube, BID, Stopped at 03/06/19 1715 **AND** polyethylene glycol/lytes (MIRALAX) PACKET 1 Packet, 1 Packet, Enteral Tube, QDAY PRN **AND** magnesium hydroxide (MILK OF MAGNESIA) suspension 30 mL, 30 mL, Enteral Tube, QDAY PRN **AND** bisacodyl (DULCOLAX) suppository 10 mg, 10 mg, Rectal, QDAY PRN, Fabricio Ivey M.D.  •  promethazine (PHENERGAN) tablet 12.5-25 mg, 12.5-25 mg, Enteral Tube, Q4HRS PRN, Fabricio Ivey M.D.  •  dexmedetomidine (PRECEDEX) 400 mcg/100mL NS premix infusion, 0.1-1.5 mcg/kg/hr, Intravenous, Continuous, Gabriel Kincaid M.D., Last Rate: 45.8 mL/hr at 03/07/19 0734,  1.5 mcg/kg/hr at 03/07/19 0734  •  metoprolol (LOPRESSOR) tablet 25 mg, 25 mg, Enteral Tube, TWICE DAILY, Zhou Longo M.D., Stopped at 03/06/19 0600  •  fentaNYL (SUBLIMAZE) 50 mcg/mL in 50mL (Continuous Infusion), , Intravenous, Continuous, Fabricio Ivey M.D., Last Rate: 2.5 mL/hr at 03/07/19 0612, 125 mcg/hr at 03/07/19 0612  •  fentaNYL (SUBLIMAZE) injection 25 mcg, 25 mcg, Intravenous, Q HOUR PRN **OR** fentaNYL (SUBLIMAZE) injection 50 mcg, 50 mcg, Intravenous, Q HOUR PRN **OR** fentaNYL (SUBLIMAZE) injection 100 mcg, 100 mcg, Intravenous, Q HOUR PRN, Fabricio Ivey M.D., 100 mcg at 03/07/19 0551  •  Respiratory Care per Protocol, , Nebulization, Continuous RT, Fabricio Ivey M.D.  •  MD Alert...ICU Electrolyte Replacement per Pharmacy, , Other, PHARMACY TO DOSE, Fabricio Ivey M.D.  •  ipratropium-albuterol (DUONEB) nebulizer solution, 3 mL, Nebulization, Q2HRS PRN (RT), Fabricio Ivey M.D.  •  [COMPLETED] piperacillin-tazobactam (ZOSYN) 4.5 g in  mL IVPB, 4.5 g, Intravenous, Once, Stopped at 03/05/19 2356 **AND** piperacillin-tazobactam (ZOSYN) 4.5 g in  mL IVPB, 4.5 g, Intravenous, Q8HRS, Fabricio Ivey M.D., Last Rate: 25 mL/hr at 03/07/19 0541, 4.5 g at 03/07/19 0541  •  vasopressin (VASOSTRICT) 20 Units in  mL Infusion, 0.03 Units/min, Intravenous, Continuous, Fabricio Ivey M.D., Stopped at 03/05/19 2230  •  amiodarone (CORDARONE) 450 mg in D5W 250 mL Infusion, 0.5-1 mg/min, Intravenous, Continuous, Fabricio Ivey M.D., Last Rate: 17 mL/hr at 03/06/19 2249, 0.5 mg/min at 03/06/19 2249  •  gabapentin (NEURONTIN) capsule 300 mg, 300 mg, Enteral Tube, TID, KWAKU Granger.O., Stopped at 03/06/19 0600  •  omeprazole 2 mg/mL in sodium bicarbonate (PRILOSEC) oral susp 40 mg, 40 mg, Enteral Tube, DAILY, Fabricio Shipley D.O., Stopped at 03/06/19 0600  •  acetaminophen (TYLENOL) tablet 650 mg, 650 mg, Enteral Tube, Q4HRS PRN,  Fabricio Shipley D.O., 650 mg at 03/07/19 0146  •  atorvastatin (LIPITOR) tablet 40 mg, 40 mg, Enteral Tube, Q EVENING, Fabricio Shipley D.O., Stopped at 03/06/19 1715  •  levothyroxine (SYNTHROID) tablet 150 mcg, 150 mcg, Enteral Tube, AM ES, Fabricio Shipley D.O., Stopped at 03/06/19 0600  •  enoxaparin (LOVENOX) inj 40 mg, 40 mg, Subcutaneous, DAILY, Fabricio Ivey M.D., Stopped at 03/06/19 0541  •  lactated ringers infusion, , Intravenous, Continuous, Ace Patiño M.D., Last Rate: 83 mL/hr at 03/06/19 0545  •  ondansetron (ZOFRAN) syringe/vial injection 4 mg, 4 mg, Intravenous, Q4HRS PRN, Kareem Solis M.D., 4 mg at 03/06/19 2248  •  promethazine (PHENERGAN) suppository 12.5-25 mg, 12.5-25 mg, Rectal, Q4HRS PRN, Kareem Solis M.D.  •  prochlorperazine (COMPAZINE) injection 5-10 mg, 5-10 mg, Intravenous, Q4HRS PRN, Kareem Solis M.D.  •  insulin regular (HUMULIN R) injection 1-6 Units, 1-6 Units, Subcutaneous, Q6HRS, Stopped at 03/07/19 0508 **AND** Accu-Chek Q6 if NPO, , , Q6H **AND** NOTIFY MD and PharmD, , , Once **AND** [DISCONTINUED] glucose 4 g chewable tablet 16 g, 16 g, Oral, Q15 MIN PRN **AND** dextrose 50% (D50W) injection 25 mL, 25 mL, Intravenous, Q15 MIN PRN, Kareem Solis M.D.    Current Outpatient Medications:  Current Facility-Administered Medications   Medication Dose Route Frequency Provider Last Rate Last Dose   • phenylephrine (STIVEN-SYNEPHRINE) 40,000 mcg in D5W 250 mL Infusion  0-300 mcg/min Intravenous Continuous Fabricio Ivey M.D. 30 mL/hr at 03/07/19 0540 80 mcg/min at 03/07/19 0540   • potassium chloride in water (KCL) ivpb **Administer in ICU only** 20 mEq  20 mEq Intravenous Once Fabricio Ivey M.D.       • ondansetron (ZOFRAN ODT) dispertab 4 mg  4 mg Enteral Tube Q4HRS PRN Fabricio Ivey M.D.       • senna-docusate (PERICOLACE or SENOKOT S) 8.6-50 MG per tablet 2 Tab  2 Tab Enteral Tube BID Fabricio Ivey M.D.   Stopped at  03/06/19 1715    And   • polyethylene glycol/lytes (MIRALAX) PACKET 1 Packet  1 Packet Enteral Tube QDAY PRN Fabricio Ivey M.D.        And   • magnesium hydroxide (MILK OF MAGNESIA) suspension 30 mL  30 mL Enteral Tube QDAY PRN Fabricio Ivey M.D.        And   • bisacodyl (DULCOLAX) suppository 10 mg  10 mg Rectal QDAY PRN Fabricio Ievy M.D.       • promethazine (PHENERGAN) tablet 12.5-25 mg  12.5-25 mg Enteral Tube Q4HRS PRN Fabricio Ivey M.D.       • dexmedetomidine (PRECEDEX) 400 mcg/100mL NS premix infusion  0.1-1.5 mcg/kg/hr Intravenous Continuous Gabriel Kincaid M.D. 45.8 mL/hr at 03/07/19 0734 1.5 mcg/kg/hr at 03/07/19 0734   • metoprolol (LOPRESSOR) tablet 25 mg  25 mg Enteral Tube TWICE DAILY Zhou Longo M.D.   Stopped at 03/06/19 0600   • fentaNYL (SUBLIMAZE) 50 mcg/mL in 50mL (Continuous Infusion)   Intravenous Continuous Fabricio Ivey M.D. 2.5 mL/hr at 03/07/19 0612 125 mcg/hr at 03/07/19 0612   • fentaNYL (SUBLIMAZE) injection 25 mcg  25 mcg Intravenous Q HOUR PRN Fabricio Ivey M.D.        Or   • fentaNYL (SUBLIMAZE) injection 50 mcg  50 mcg Intravenous Q HOUR PRN Fabricio Ivey M.D.        Or   • fentaNYL (SUBLIMAZE) injection 100 mcg  100 mcg Intravenous Q HOUR PRN Fabricio Ivey M.D.   100 mcg at 03/07/19 0551   • Respiratory Care per Protocol   Nebulization Continuous RT Fabricio Ivey M.D.       • MD Alert...ICU Electrolyte Replacement per Pharmacy   Other PHARMACY TO DOSE Fabricio Ivey M.D.       • ipratropium-albuterol (DUONEB) nebulizer solution  3 mL Nebulization Q2HRS PRN (RT) Fabricio Ivey M.D.       • piperacillin-tazobactam (ZOSYN) 4.5 g in  mL IVPB  4.5 g Intravenous Q8HRS Fabricio Ivey M.D. 25 mL/hr at 03/07/19 0541 4.5 g at 03/07/19 0541   • vasopressin (VASOSTRICT) 20 Units in  mL Infusion  0.03 Units/min Intravenous Continuous Fabricio Ivey M.D.   Stopped at  "03/05/19 2230   • amiodarone (CORDARONE) 450 mg in D5W 250 mL Infusion  0.5-1 mg/min Intravenous Continuous Fabricio Ivey M.D. 17 mL/hr at 03/06/19 2249 0.5 mg/min at 03/06/19 2249   • gabapentin (NEURONTIN) capsule 300 mg  300 mg Enteral Tube TID Fabricio Shipley D.OAngela   Stopped at 03/06/19 0600   • omeprazole 2 mg/mL in sodium bicarbonate (PRILOSEC) oral susp 40 mg  40 mg Enteral Tube DAILY Fabricio Shipley D.O.   Stopped at 03/06/19 0600   • acetaminophen (TYLENOL) tablet 650 mg  650 mg Enteral Tube Q4HRS PRN KWAKU Granger.OAngela   650 mg at 03/07/19 0146   • atorvastatin (LIPITOR) tablet 40 mg  40 mg Enteral Tube Q EVENING KWAKU Granger.O.   Stopped at 03/06/19 1715   • levothyroxine (SYNTHROID) tablet 150 mcg  150 mcg Enteral Tube AM ES Fabricio Shipley D.O.   Stopped at 03/06/19 0600   • enoxaparin (LOVENOX) inj 40 mg  40 mg Subcutaneous DAILY Fabricio Ivey M.D.   Stopped at 03/06/19 0541   • lactated ringers infusion   Intravenous Continuous Ace Patiño M.D. 83 mL/hr at 03/06/19 0545     • ondansetron (ZOFRAN) syringe/vial injection 4 mg  4 mg Intravenous Q4HRS PRN Kareem Solis M.D.   4 mg at 03/06/19 2248   • promethazine (PHENERGAN) suppository 12.5-25 mg  12.5-25 mg Rectal Q4HRS PRN Kareem Solis M.D.       • prochlorperazine (COMPAZINE) injection 5-10 mg  5-10 mg Intravenous Q4HRS PRSUHA Solis M.D.       • insulin regular (HUMULIN R) injection 1-6 Units  1-6 Units Subcutaneous Q6HRS Kareem Solis M.D.   Stopped at 03/07/19 0508    And   • dextrose 50% (D50W) injection 25 mL  25 mL Intravenous Q15 MIN PRN Kareem Solis M.D.           Medication Allergy:  Allergies   Allergen Reactions   • Hydromorphone      Swelling   • Tramadol      \"loose short term memory\"       Family History:  No family history on file.    Social History:  Social History     Social History   • Marital status:      Spouse name: N/A   • Number of children: N/A   • Years of " "education: N/A     Occupational History   • Not on file.     Social History Main Topics   • Smoking status: Current Every Day Smoker     Packs/day: 1.00     Years: 47.00     Types: Cigarettes   • Smokeless tobacco: Never Used   • Alcohol use No   • Drug use: No   • Sexual activity: Not on file     Other Topics Concern   • Not on file     Social History Narrative   • No narrative on file         Physical Exam:  Vitals/ General Appearance:   Weight/BMI: Body mass index is 40.74 kg/m².  Blood pressure (!) 91/57, pulse 98, temperature 36.9 °C (98.4 °F), resp. rate 17, height 1.727 m (5' 7.99\"), weight 121.5 kg (267 lb 13.7 oz), SpO2 94 %, not currently breastfeeding.  Vitals:    03/07/19 0745 03/07/19 0800 03/07/19 0815 03/07/19 0830   BP:       Pulse: 76 78 85 98   Resp: (!) 22 (!) 21 (!) 21 17   Temp:  36.9 °C (98.4 °F)     TempSrc:       SpO2: 97% 94% 94% 94%   Weight:       Height:         Oxygen Therapy:  Pulse Oximetry: 94 %, FiO2%: 30 %, O2 Delivery: Ventilator    Constitutional:   Well developed, obese, intubated and mechanically ventilated  HENMT:  Normocephalic, Atraumatic.  Eyes:  EOMI, Conjunctiva normal, No discharge.  Cardiovascular:  Irregular rhythm, normal rate, no murmurs  Lungs:  Normal breath sounds, breath sounds clear to auscultation bilaterally,  Few crackles  Abdomen:distended, firm, no tenderness, hypoactive bowel sounds,   Skin: Warm, Dry, No erythema, No rash, no induration.  Neurologic: Awake, nods, follows commands        MDM (Data Review):     Records reviewed and summarized in current documentation    Lab Data Review:  Recent Results (from the past 24 hour(s))   VANCOMYCIN TIMED (RANDOM) LEVEL    Collection Time: 03/06/19 10:15 AM   Result Value Ref Range    Vancomycin Unknown Level 20.3 ug/mL   LACTIC ACID    Collection Time: 03/06/19 10:15 AM   Result Value Ref Range    Lactic Acid 1.3 0.5 - 2.0 mmol/L   ACCU-CHEK GLUCOSE    Collection Time: 03/06/19 11:30 AM   Result Value Ref Range    " Glucose - Accu-Ck 165 (H) 65 - 99 mg/dL   Cytology    Collection Time: 03/06/19  2:40 PM   Result Value Ref Range    Cytology Reg See Path Report    ACCU-CHEK GLUCOSE    Collection Time: 03/06/19  5:12 PM   Result Value Ref Range    Glucose - Accu-Ck 137 (H) 65 - 99 mg/dL   ACCU-CHEK GLUCOSE    Collection Time: 03/07/19 12:06 AM   Result Value Ref Range    Glucose - Accu-Ck 165 (H) 65 - 99 mg/dL   ISTAT ARTERIAL BLOOD GAS    Collection Time: 03/07/19  4:49 AM   Result Value Ref Range    Ph 7.405 7.400 - 7.500    Pco2 38.8 (H) 26.0 - 37.0 mmHg    Po2 92 (H) 64 - 87 mmHg    Tco2 26 20 - 33 mmol/L    S02 97 93 - 99 %    Hco3 24.3 17.0 - 25.0 mmol/L    BE 0 -4 - 3 mmol/L    Body Temp 100.6 F degrees    O2 Therapy 40 %    iPF Ratio 230     Ph Temp Liz 7.389 (L) 7.400 - 7.500    Pco2 Temp Co 40.8 (H) 26.0 - 37.0 mmHg    Po2 Temp Cor 99 (H) 64 - 87 mmHg    Specimen Arterial     Action Range Triggered NO     Inst. Qualified Patient YES    Basic Metabolic Panel    Collection Time: 03/07/19  5:00 AM   Result Value Ref Range    Sodium 144 135 - 145 mmol/L    Potassium 3.6 3.6 - 5.5 mmol/L    Chloride 109 96 - 112 mmol/L    Co2 27 20 - 33 mmol/L    Glucose 160 (H) 65 - 99 mg/dL    Bun 18 8 - 22 mg/dL    Creatinine 1.38 0.50 - 1.40 mg/dL    Calcium 7.7 (L) 8.5 - 10.5 mg/dL    Anion Gap 8.0 0.0 - 11.9   CBC WITH DIFFERENTIAL    Collection Time: 03/07/19  5:00 AM   Result Value Ref Range    WBC 10.3 4.8 - 10.8 K/uL    RBC 3.25 (L) 4.20 - 5.40 M/uL    Hemoglobin 9.4 (L) 12.0 - 16.0 g/dL    Hematocrit 29.9 (L) 37.0 - 47.0 %    MCV 92.0 81.4 - 97.8 fL    MCH 28.9 27.0 - 33.0 pg    MCHC 31.4 (L) 33.6 - 35.0 g/dL    RDW 51.2 (H) 35.9 - 50.0 fL    Platelet Count 311 164 - 446 K/uL    MPV 10.0 9.0 - 12.9 fL    Neutrophils-Polys 51.70 44.00 - 72.00 %    Lymphocytes 20.70 (L) 22.00 - 41.00 %    Monocytes 6.90 0.00 - 13.40 %    Eosinophils 2.60 0.00 - 6.90 %    Basophils 2.60 (H) 0.00 - 1.80 %    Nucleated RBC 0.30 /100 WBC    Neutrophils  (Absolute) 6.83 2.00 - 7.15 K/uL    Lymphs (Absolute) 2.13 1.00 - 4.80 K/uL    Monos (Absolute) 0.71 0.00 - 0.85 K/uL    Eos (Absolute) 0.27 0.00 - 0.51 K/uL    Baso (Absolute) 0.27 (H) 0.00 - 0.12 K/uL    NRBC (Absolute) 0.03 K/uL    Hypochromia 1+     Anisocytosis 1+     Macrocytosis 1+     Microcytosis 1+    Magnesium    Collection Time: 03/07/19  5:00 AM   Result Value Ref Range    Magnesium 2.0 1.5 - 2.5 mg/dL   Triglyceride    Collection Time: 03/07/19  5:00 AM   Result Value Ref Range    Triglycerides 231 (H) 0 - 149 mg/dL   ESTIMATED GFR    Collection Time: 03/07/19  5:00 AM   Result Value Ref Range    GFR If  46 (A) >60 mL/min/1.73 m 2    GFR If Non  38 (A) >60 mL/min/1.73 m 2   VANCOMYCIN TIMED    Collection Time: 03/07/19  5:00 AM   Result Value Ref Range    Vancomycin Unknown Level 12.3 ug/mL   PERIPHERAL SMEAR REVIEW    Collection Time: 03/07/19  5:00 AM   Result Value Ref Range    Peripheral Smear Review see below    PLATELET ESTIMATE    Collection Time: 03/07/19  5:00 AM   Result Value Ref Range    Plt Estimation Normal    MORPHOLOGY    Collection Time: 03/07/19  5:00 AM   Result Value Ref Range    RBC Morphology Present     Polychromia 1+    DIFFERENTIAL MANUAL    Collection Time: 03/07/19  5:00 AM   Result Value Ref Range    Bands-Stabs 14.60 (H) 0.00 - 10.00 %    Metamyelocytes 0.90 %    Manual Diff Status PERFORMED    ACCU-CHEK GLUCOSE    Collection Time: 03/07/19  5:04 AM   Result Value Ref Range    Glucose - Accu-Ck 79 65 - 99 mg/dL   ACCU-CHEK GLUCOSE    Collection Time: 03/07/19  8:44 AM   Result Value Ref Range    Glucose - Accu-Ck 153 (H) 65 - 99 mg/dL       Imaging/Procedures Review:    DX-CHEST-PORTABLE (1 VIEW)   Final Result         1.  Pulmonary edema and/or infiltrates are identified, which are stable since the prior exam.   2.  Atherosclerosis            CT-ABDOMEN-PELVIS WITH   Final Result      1.  Increased pulmonary opacification in the lung bases  and small pleural fluid collections.      2.  Some increase in dilatation of the large bowel including the right colon and cecum compared to the prior exam. Findings could be due to ileus. Partial obstruction is possible but no obstructing lesion is appreciated.      3.  Mildly dilated small bowel loops again noted.      4.  Edema in subcutaneous fat is also somewhat increased compared to the prior exam.         DX-CHEST-PORTABLE (1 VIEW)   Final Result         1.  Pulmonary edema and/or infiltrates are identified, which are somewhat decreased since the prior exam.   2.  Atherosclerosis         DX-ABDOMEN FOR TUBE PLACEMENT   Final Result         1.  Filled distended bowel loops, appearance suggests ileus or evolving obstructive changes.   2.  Nasogastric tube tip terminates overlying the expected location of the gastric body.   3.  Pulmonary edema and/or infiltrates      DX-CHEST-PORTABLE (1 VIEW)   Final Result         1.  Pulmonary edema and/or infiltrates are identified, which appear somewhat increased since the prior exam.      [id number] VIEW   Final Result         1.  Air-filled distended loops of bowel are seen. Appearance raises concern for obstruction, radiographic follow-up to resolution recommended.   2.  Dobbhoff tube tip overlying the expected location of the pylorus or first duodenal segment.   3.  Hazy bilateral lung base infiltrates      DX-CHEST-PORTABLE (1 VIEW)   Final Result         1.  Pulmonary edema and/or infiltrates are identified, which appear somewhat increased since the prior exam.      DX-ABDOMEN FOR TUBE PLACEMENT   Final Result         1.  Air-filled distended loops of bowel are seen with reactive mucosal pattern, appearance suggests ileus or enteritis. Recommend radiographic followup to resolution to exclude progression to obstruction.   2.  Dobbhoff tube tip overlying the expected location of the pylorus or first duodenal segment.   3.  Hazy bilateral lung base opacities suggests  infiltrates      MR-BRAIN-W/O   Final Result      1.  No acute abnormality.   2.  The flow voids of the cerebral vessels particularly left M1 segment is patent.The hyperdensity on the recent CT scan likely represent artifact.   3.  Mild cerebral atrophy.      EC-ECHOCARDIOGRAM COMPLETE W/ CONT   Final Result      CT-HEAD W/O   Final Result   Addendum 1 of 1   Call report initiated at 1:40 PM on 3/1/2019.      These findings were discussed with LARRY WISEMAN on 3/1/2019 2:24 PM.      Final      1.  Subtle increased density of LEFT middle cerebral artery and sylvian branches possibly indicating slow flow or occlusion and potential early sign of acute stroke.   2.  No intracranial hemorrhage.   3.  Mild atrophy.      CT-ABDOMEN-PELVIS W/O   Final Result      Decrease in volume of colonic stool with no abnormal bowel dilatation detected      New third spacing with some new mild abdominopelvic ascites. No free air is seen to indicate extravasation. No loculated fluid collection to suggest abscess is noted      No pneumatosis detected to suggest bowel ischemia         DX-ABDOMEN FOR TUBE PLACEMENT   Final Result      Enteric tube terminates over the gastroduodenal junction.      DX-CHEST-LIMITED (1 VIEW)   Final Result      New right IJ line tip overlies the SVC      No radiographic evidence of acute cardiopulmonary process.      DX-CHEST-PORTABLE (1 VIEW)   Final Result      No acute cardiopulmonary abnormality.      CT-CTA COMPLETE THORACOABDOMINAL AORTA   Final Result      1. No evidence of aortic dissection or aneurysm. No high-grade stenosis or occlusion of major pelvic branches vessels.   2. No pulmonary embolus.   3. Moderate to large amount of stool throughout the colon.                Impressions:  1. Acute colonic pseudo obstruction. Patient is NPO with NG tube to suction.  2. Sepsis secondary to UTI. On pressors, vanc and zosyn  3. Atrial fibrillation, currently rate controlled  4. Respiratory failure,  intubated and mechanically ventilated  5. Diabetes mellitus  6. Hypothyroidism.      Recommendations:     1. Patient appears to have failed conservative care for the last 2 days. She does not have any absolute contraindications for neostigmine.  Neostigmine 2 mg IV ordered to be administered over 5 mins.  2. Insert rectal tube and obtain KUB prior to administration of neostigmine  3. Frequent turning  4. Minimize offending medications such as sedatives/pain medications  5. Correct electrolytes  6. KUB tomorrow    Thank your for the opportunity to assist in the care of your patient.  Please call for any questions or concerns.

## 2019-03-07 NOTE — PROGRESS NOTES
Progress note    Called for ct imaging results and afib rvr.  She has partial small bowel obstruction. Ng to suction.  Started precedex for sedation, had ongoing afib with rvr.  Given metoproll 5 mg q 5 min prn for hr < 110 with effect.  On fentanyl prn and fentanyl drip for pain.  Patient seen and apperas comfortable.  Hypoactive bowel sounds.  Ventilator setting adequate.  Reviewed imaging and labs.  Abdomen less distended per nursing, soft currently.      Patient is critically ill. The patient continues to have respiratory failure on a ventilator.  Adjusted sedation with precedex drip.  Given medications for afib rvr, she is continued on amiodarone drip.  If untreated there is a high chance of deterioration and eventually death. Remains on pressors for map > 65.  .The critical that has been undertaken is medically complex. There has been no overlap in critical care time. Critical Care Time not including procedures: 45 minutes

## 2019-03-07 NOTE — PROGRESS NOTES
"Pharmacy Kinetics 63 y.o. female on vancomycin day # 1 3/6/2019    Currently on Pulse dosing:  Vancomycin 3000 mg iv loading dose on  at 2330    Indication for Treatment: Empiric Sepsis with unknown source (possible UTI)    Pertinent history per medical record: Admitted on 2019 for sepsis due to possible UTI. Patient was on ceftriaxone previously, however, patient decompensated and became febrile, tachypneic, and tachycardic prompting emergent intubation and broadening of antibiotics.    Other antibiotics:   Piperacillin/tazobactam 4.5gm iv q8hrs    Allergies: Hydromorphone and Tramadol     List concerns for renal function: obesity, malnutrition/low albumin, hypermetabolic state (SIRS), pressors/hypotension (phenylephrine), nephrotoxic drugs (pip/tazo).    Pertinent cultures to date:    - Urine - E. Coli  3/5 - BAL - No orgs seen    MRSA nares swab if pneumonia is a concern (ordered/positive/negative/n-a): IP    Recent Labs      19   0300  19   0209   WBC  7.5  5.0  8.1  6.6   NEUTSPOLYS  70.40  70.40  61.10  58.80   BANDSSTABS  2.60   --   7.10  3.50     Recent Labs      19   0300  19   0204  19   0209   BUN  13  14  15  16   CREATININE  0.69  0.75  0.79  0.90   ALBUMIN   --    --   2.7*   --      Recent Labs      19   1015   VANCORANDOM  20.3     Intake/Output Summary (Last 24 hours) at 19 1650  Last data filed at 19 1230   Gross per 24 hour   Intake          4377.19 ml   Output             1385 ml   Net          2992.19 ml      Blood pressure (!) 91/57, pulse (!) 142, temperature 37.7 °C (99.9 °F), resp. rate 18, height 1.727 m (5' 7.99\"), weight 122 kg (268 lb 15.4 oz), SpO2 96 %, not currently breastfeeding. Temp (24hrs), Av °C (100.4 °F), Min:37.4 °C (99.4 °F), Max:39.2 °C (102.5 °F)      A/P   1. Vancomycin dose change: New start pulse dosing  2. Next vancomycin level: Next 1-2 days  3. Goal " trough: ~16mcg/mL (UTI possible source prompting level of 12-16mcg/mL, however, patient was septic prompting higher goal of 16-20mcg/mL but gram positive organisms are not the most predominant pathogen usually and so assessment of antibiotic will continue to be reviewed for de-escalation)  4. Comments: Urine output currently inadequate and Scr elevated from prior. Patient has already achieved supratherapeutic trough with one dose and has risk factors for accumulation. Will consider additional trough in 1-2 days for pulse dosing. Pharmacy will continue to monitor.    Naresh WernerD

## 2019-03-07 NOTE — CARE PLAN
Problem: Ventilation Defect:  Goal: Ability to achieve and maintain unassisted ventilation or tolerate decreased levels of ventilator support  Outcome: PROGRESSING SLOWER THAN EXPECTED  Adult Ventilation Update    Total Vent Days: 3    Patient Lines/Drains/Airways Status    Active Airway     Name: Placement date: Placement time: Site: Days:    Airway ETT Oral 7.5@21 03/05/19 2145   Oral     3              CMV 22/390/10/40%    Plateau Pressure (Q Shift): 13 (03/06/19 1849)  Static Compliance (ml / cm H2O): 119 (03/07/19 0308)    Sputum/Suction   Cough: Productive (03/07/19 0400)  Sputum Amount: Small (03/07/19 0400)  Sputum Color: White;Yellow;Tan (03/07/19 0400)  Sputum Consistency: Thick (03/07/19 0400)    Mobility  Level of Mobility: Level IV (03/06/19 0800)  Activity Performed: Sitting up in bed (03/06/19 0800)  Time Activity Tolerated: 5 min (03/05/19 2000)  Distance Per Occurrence (ft.): 1 feet (02/28/19 2200)  # of Times Distance was Traveled: 2 (03/01/19 0000)  Assistance / Tolerance: Assistance of Two or More;Tolerates Poorly (03/05/19 2000)  Pt Calls for Assistance: No (03/05/19 2000)  Staff Present for Mobilization: RN (03/06/19 0800)  Gait: Unsteady (03/05/19 2000)  Assistive Devices:  (CHRISTIANO) (03/01/19 2000)  Reason Not Mobilized: Unstable condition (ongoing titration of meds) (03/06/19 2200)  Mobilization Comments: A-fib RVR (03/04/19 0800)    Events/Summary/Plan: No ventilator changes made.

## 2019-03-07 NOTE — PROGRESS NOTES
1900-patient to CT with Day RN  2000-Dr Kincaid notified CT results are back and that HR remains elevated. Patient condition and plan of care discussed. See orders.  2130. Dr. Ivey at bedside. Patient condition and plan of care reviewed.  2200 Plan of care reviewed with patient son via phone. Patient son in agreement.   0100 Oral temp 103.6F. Patient opens eyes to RN command and  appears to have involuntary twitching in extremities. Dr. Kincaid notified. Labs reviewed. Cooling blanket ordered. MD to come to bedside and assess patient.

## 2019-03-07 NOTE — PROGRESS NOTES
"Subjective:      Sonya Segovia is a 63 y.o. female who presents with Back Pain; Abdominal Pain; and Syncope            Asked to see again for evaluation for potential surgical issues.   Repeat CT (third) without ischemia or perforation.  WBC normal without shift.    Intubated but alert and responsive.     CT with possible pseudo-obstruction.        Back Pain   Associated symptoms include abdominal pain.   Abdominal Pain     Syncope   Associated symptoms include abdominal pain and back pain.       Review of Systems   Unable to perform ROS: Intubated   Cardiovascular: Positive for syncope.   Gastrointestinal: Positive for abdominal pain.   Musculoskeletal: Positive for back pain.          Objective:     BP (!) 91/57   Pulse 98   Temp 36.9 °C (98.4 °F)   Resp 17   Ht 1.727 m (5' 7.99\")   Wt 121.5 kg (267 lb 13.7 oz)   SpO2 94%   Breastfeeding? No   BMI 40.74 kg/m²      Physical Exam   Constitutional: No distress.   Cardiovascular: Normal rate.    Abdominal: She exhibits distension. There is tenderness.               Assessment/Plan:     Distention, UTI    1.   CT consistent with colonic pseudo-obstruction (mechanical obstruction unlikely given previous CT with oral contrast passing without difficulty).  Recommend GI consultation for consideration of colonoscopic decompression versus medical management.    2.  Recommend daily KUB and NGT decompression.      "

## 2019-03-07 NOTE — PROGRESS NOTES
I consulted Dr. Nichols of gastroenterology who has kindly agreed to see the patient in consultation  Patient intubated, on mechanical ventilation  Hospitalist service will sign off with plans to re-establish care at the time patient is extubated or at any time our services are requested

## 2019-03-08 ENCOUNTER — APPOINTMENT (OUTPATIENT)
Dept: RADIOLOGY | Facility: MEDICAL CENTER | Age: 64
DRG: 871 | End: 2019-03-08
Attending: INTERNAL MEDICINE
Payer: MEDICARE

## 2019-03-08 LAB
ACTION RANGE TRIGGERED IACRT: NO
ANION GAP SERPL CALC-SCNC: 3 MMOL/L (ref 0–11.9)
BACTERIA BRONCH AEROBE CULT: NORMAL
BASE EXCESS BLDA CALC-SCNC: -2 MMOL/L (ref -4–3)
BASOPHILS # BLD AUTO: 2.6 % (ref 0–1.8)
BASOPHILS # BLD: 0.26 K/UL (ref 0–0.12)
BODY TEMPERATURE: ABNORMAL DEGREES
BUN SERPL-MCNC: 18 MG/DL (ref 8–22)
CALCIUM SERPL-MCNC: 6.8 MG/DL (ref 8.5–10.5)
CHLORIDE SERPL-SCNC: 112 MMOL/L (ref 96–112)
CO2 BLDA-SCNC: 24 MMOL/L (ref 20–33)
CO2 SERPL-SCNC: 27 MMOL/L (ref 20–33)
CREAT SERPL-MCNC: 1.21 MG/DL (ref 0.5–1.4)
EOSINOPHIL # BLD AUTO: 0.09 K/UL (ref 0–0.51)
EOSINOPHIL NFR BLD: 0.9 % (ref 0–6.9)
ERYTHROCYTE [DISTWIDTH] IN BLOOD BY AUTOMATED COUNT: 52.4 FL (ref 35.9–50)
GLUCOSE BLD-MCNC: 125 MG/DL (ref 65–99)
GLUCOSE BLD-MCNC: 137 MG/DL (ref 65–99)
GLUCOSE BLD-MCNC: 147 MG/DL (ref 65–99)
GLUCOSE BLD-MCNC: 149 MG/DL (ref 65–99)
GLUCOSE BLD-MCNC: 164 MG/DL (ref 65–99)
GLUCOSE SERPL-MCNC: 157 MG/DL (ref 65–99)
GRAM STN SPEC: NORMAL
HCO3 BLDA-SCNC: 22.7 MMOL/L (ref 17–25)
HCT VFR BLD AUTO: 29 % (ref 37–47)
HGB BLD-MCNC: 8.9 G/DL (ref 12–16)
HOROWITZ INDEX BLDA+IHG-RTO: 217 MM[HG]
HYPOCHROMIA BLD QL SMEAR: ABNORMAL
INST. QUALIFIED PATIENT IIQPT: YES
LYMPHOCYTES # BLD AUTO: 2.98 K/UL (ref 1–4.8)
LYMPHOCYTES NFR BLD: 30.1 % (ref 22–41)
MANUAL DIFF BLD: ABNORMAL
MCH RBC QN AUTO: 28.8 PG (ref 27–33)
MCHC RBC AUTO-ENTMCNC: 30.7 G/DL (ref 33.6–35)
MCV RBC AUTO: 93.9 FL (ref 81.4–97.8)
MONOCYTES # BLD AUTO: 0.27 K/UL (ref 0–0.85)
MONOCYTES NFR BLD AUTO: 2.7 % (ref 0–13.4)
MORPHOLOGY BLD-IMP: NORMAL
MRSA DNA SPEC QL NAA+PROBE: NORMAL
NEUTROPHILS # BLD AUTO: 6.31 K/UL (ref 2–7.15)
NEUTROPHILS NFR BLD: 48.7 % (ref 44–72)
NEUTS BAND NFR BLD MANUAL: 15 % (ref 0–10)
NRBC # BLD AUTO: 0.02 K/UL
NRBC BLD-RTO: 0.2 /100 WBC
O2/TOTAL GAS SETTING VFR VENT: 30 %
PCO2 BLDA: 36.2 MMHG (ref 26–37)
PCO2 TEMP ADJ BLDA: 38.5 MMHG (ref 26–37)
PH BLDA: 7.41 [PH] (ref 7.4–7.5)
PH TEMP ADJ BLDA: 7.39 [PH] (ref 7.4–7.5)
PHOSPHATE SERPL-MCNC: 1.9 MG/DL (ref 2.5–4.5)
PLATELET # BLD AUTO: 374 K/UL (ref 164–446)
PLATELET BLD QL SMEAR: NORMAL
PMV BLD AUTO: 10.1 FL (ref 9–12.9)
PO2 BLDA: 65 MMHG (ref 64–87)
PO2 TEMP ADJ BLDA: 71 MMHG (ref 64–87)
POLYCHROMASIA BLD QL SMEAR: NORMAL
POTASSIUM SERPL-SCNC: 3.9 MMOL/L (ref 3.6–5.5)
RBC # BLD AUTO: 3.09 M/UL (ref 4.2–5.4)
RBC BLD AUTO: PRESENT
SAO2 % BLDA: 93 % (ref 93–99)
SIGNIFICANT IND 70042: NORMAL
SIGNIFICANT IND 70042: NORMAL
SITE SITE: NORMAL
SITE SITE: NORMAL
SODIUM SERPL-SCNC: 142 MMOL/L (ref 135–145)
SOURCE SOURCE: NORMAL
SOURCE SOURCE: NORMAL
SPECIMEN DRAWN FROM PATIENT: ABNORMAL
TOXIC GRANULES BLD QL SMEAR: SLIGHT
WBC # BLD AUTO: 9.9 K/UL (ref 4.8–10.8)

## 2019-03-08 PROCEDURE — 770022 HCHG ROOM/CARE - ICU (200)

## 2019-03-08 PROCEDURE — 700111 HCHG RX REV CODE 636 W/ 250 OVERRIDE (IP): Performed by: INTERNAL MEDICINE

## 2019-03-08 PROCEDURE — 80048 BASIC METABOLIC PNL TOTAL CA: CPT

## 2019-03-08 PROCEDURE — 700105 HCHG RX REV CODE 258: Performed by: INTERNAL MEDICINE

## 2019-03-08 PROCEDURE — 700101 HCHG RX REV CODE 250: Performed by: INTERNAL MEDICINE

## 2019-03-08 PROCEDURE — 700102 HCHG RX REV CODE 250 W/ 637 OVERRIDE(OP): Performed by: HOSPITALIST

## 2019-03-08 PROCEDURE — 74018 RADEX ABDOMEN 1 VIEW: CPT

## 2019-03-08 PROCEDURE — A9270 NON-COVERED ITEM OR SERVICE: HCPCS | Performed by: HOSPITALIST

## 2019-03-08 PROCEDURE — 94003 VENT MGMT INPAT SUBQ DAY: CPT

## 2019-03-08 PROCEDURE — 71045 X-RAY EXAM CHEST 1 VIEW: CPT

## 2019-03-08 PROCEDURE — 82803 BLOOD GASES ANY COMBINATION: CPT

## 2019-03-08 PROCEDURE — 94150 VITAL CAPACITY TEST: CPT

## 2019-03-08 PROCEDURE — A9270 NON-COVERED ITEM OR SERVICE: HCPCS | Performed by: INTERNAL MEDICINE

## 2019-03-08 PROCEDURE — 700102 HCHG RX REV CODE 250 W/ 637 OVERRIDE(OP): Performed by: INTERNAL MEDICINE

## 2019-03-08 PROCEDURE — 36600 WITHDRAWAL OF ARTERIAL BLOOD: CPT

## 2019-03-08 PROCEDURE — C9113 INJ PANTOPRAZOLE SODIUM, VIA: HCPCS | Performed by: INTERNAL MEDICINE

## 2019-03-08 PROCEDURE — 99291 CRITICAL CARE FIRST HOUR: CPT | Mod: 25 | Performed by: INTERNAL MEDICINE

## 2019-03-08 PROCEDURE — 82962 GLUCOSE BLOOD TEST: CPT | Mod: 91

## 2019-03-08 PROCEDURE — 85007 BL SMEAR W/DIFF WBC COUNT: CPT

## 2019-03-08 PROCEDURE — 84100 ASSAY OF PHOSPHORUS: CPT

## 2019-03-08 PROCEDURE — 85027 COMPLETE CBC AUTOMATED: CPT

## 2019-03-08 RX ORDER — NEOSTIGMINE METHYLSULFATE 1 MG/ML
2 INJECTION, SOLUTION INTRAVENOUS ONCE
Status: COMPLETED | OUTPATIENT
Start: 2019-03-08 | End: 2019-03-08

## 2019-03-08 RX ORDER — PANTOPRAZOLE SODIUM 40 MG/10ML
40 INJECTION, POWDER, LYOPHILIZED, FOR SOLUTION INTRAVENOUS DAILY
Status: DISCONTINUED | OUTPATIENT
Start: 2019-03-08 | End: 2019-03-11

## 2019-03-08 RX ADMIN — ACETAMINOPHEN 650 MG: 325 TABLET, FILM COATED ORAL at 18:03

## 2019-03-08 RX ADMIN — CALCIUM GLUCONATE 1 G: 98 INJECTION, SOLUTION INTRAVENOUS at 06:12

## 2019-03-08 RX ADMIN — DEXMEDETOMIDINE HYDROCHLORIDE 1.5 MCG/KG/HR: 100 INJECTION, SOLUTION INTRAVENOUS at 06:14

## 2019-03-08 RX ADMIN — ATORVASTATIN CALCIUM 40 MG: 40 TABLET, FILM COATED ORAL at 16:31

## 2019-03-08 RX ADMIN — DEXMEDETOMIDINE HYDROCHLORIDE 1.5 MCG/KG/HR: 100 INJECTION, SOLUTION INTRAVENOUS at 03:49

## 2019-03-08 RX ADMIN — POLYETHYLENE GLYCOL 3350 1 PACKET: 17 POWDER, FOR SOLUTION ORAL at 18:03

## 2019-03-08 RX ADMIN — DEXMEDETOMIDINE HYDROCHLORIDE 1.5 MCG/KG/HR: 100 INJECTION, SOLUTION INTRAVENOUS at 01:19

## 2019-03-08 RX ADMIN — PANTOPRAZOLE SODIUM 40 MG: 40 INJECTION, POWDER, LYOPHILIZED, FOR SOLUTION INTRAVENOUS at 12:22

## 2019-03-08 RX ADMIN — GABAPENTIN 300 MG: 300 CAPSULE ORAL at 18:04

## 2019-03-08 RX ADMIN — SODIUM CHLORIDE, POTASSIUM CHLORIDE, SODIUM LACTATE AND CALCIUM CHLORIDE: 600; 310; 30; 20 INJECTION, SOLUTION INTRAVENOUS at 21:09

## 2019-03-08 RX ADMIN — PIPERACILLIN AND TAZOBACTAM 4.5 G: 4; .5 INJECTION, POWDER, LYOPHILIZED, FOR SOLUTION INTRAVENOUS; PARENTERAL at 12:22

## 2019-03-08 RX ADMIN — AMIODARONE HYDROCHLORIDE 0.5 MG/MIN: 50 INJECTION, SOLUTION INTRAVENOUS at 07:45

## 2019-03-08 RX ADMIN — PHENYLEPHRINE HYDROCHLORIDE 40 MCG/MIN: 10 INJECTION INTRAVENOUS at 08:32

## 2019-03-08 RX ADMIN — ACETAMINOPHEN 650 MG: 325 TABLET, FILM COATED ORAL at 03:02

## 2019-03-08 RX ADMIN — NEOSTIGMINE METHYLSULFATE 2 MG: 1 INJECTION INTRAVENOUS at 16:29

## 2019-03-08 RX ADMIN — METOPROLOL TARTRATE 25 MG: 25 TABLET, FILM COATED ORAL at 18:04

## 2019-03-08 RX ADMIN — ENOXAPARIN SODIUM 40 MG: 100 INJECTION SUBCUTANEOUS at 16:32

## 2019-03-08 RX ADMIN — POTASSIUM PHOSPHATE, MONOBASIC AND POTASSIUM PHOSPHATE, DIBASIC 30 MMOL: 224; 236 INJECTION, SOLUTION INTRAVENOUS at 12:22

## 2019-03-08 RX ADMIN — INSULIN HUMAN 1 UNITS: 100 INJECTION, SOLUTION PARENTERAL at 00:38

## 2019-03-08 RX ADMIN — FENTANYL CITRATE 100 MCG: 50 INJECTION INTRAMUSCULAR; INTRAVENOUS at 16:11

## 2019-03-08 RX ADMIN — PIPERACILLIN AND TAZOBACTAM 4.5 G: 4; .5 INJECTION, POWDER, LYOPHILIZED, FOR SOLUTION INTRAVENOUS; PARENTERAL at 21:09

## 2019-03-08 RX ADMIN — PIPERACILLIN AND TAZOBACTAM 4.5 G: 4; .5 INJECTION, POWDER, LYOPHILIZED, FOR SOLUTION INTRAVENOUS; PARENTERAL at 05:48

## 2019-03-08 RX ADMIN — AMIODARONE HYDROCHLORIDE 0.5 MG/MIN: 50 INJECTION, SOLUTION INTRAVENOUS at 21:44

## 2019-03-08 RX ADMIN — FENTANYL CITRATE 100 MCG: 50 INJECTION INTRAMUSCULAR; INTRAVENOUS at 18:02

## 2019-03-08 RX ADMIN — ACETAMINOPHEN 650 MG: 325 TABLET, FILM COATED ORAL at 22:50

## 2019-03-08 RX ADMIN — FENTANYL CITRATE 100 MCG: 50 INJECTION INTRAMUSCULAR; INTRAVENOUS at 19:43

## 2019-03-08 ASSESSMENT — PULMONARY FUNCTION TESTS: FVC: 1.87

## 2019-03-08 NOTE — CARE PLAN
Problem: Ventilation Defect:  Goal: Ability to achieve and maintain unassisted ventilation or tolerate decreased levels of ventilator support  Outcome: PROGRESSING SLOWER THAN EXPECTED  Adult Ventilation Update    Total Vent Days: 4    Patient Lines/Drains/Airways Status    Active Airway     Name: Placement date: Placement time: Site: Days:    Airway ETT Oral 7.5@21 03/05/19 2145   Oral      4              CMV 22/390/8/30%    Plateau Pressure (Q Shift): 16 (03/07/19 1910)  Static Compliance (ml / cm H2O): 98.2 (03/08/19 0314)    Sputum/Suction   Cough: Productive (03/08/19 0400)  Sputum Amount: Small (03/08/19 0400)  Sputum Color: Yellow (03/08/19 0400)  Sputum Consistency: Thick (03/08/19 0400)    Mobility  Level of Mobility: Level IV (03/07/19 2000)  Activity Performed: Sitting up in bed (03/07/19 2000)  Time Activity Tolerated: 5 min (03/07/19 2000)  Distance Per Occurrence (ft.): 1 feet (02/28/19 2200)  # of Times Distance was Traveled: 2 (03/01/19 0000)  Assistance / Tolerance: Assistance of Two or More (03/07/19 2000)  Pt Calls for Assistance: No (03/07/19 2000)  Staff Present for Mobilization: RN;CNA (03/07/19 2000)  Gait: Unsteady (03/05/19 2000)  Assistive Devices:  (CHRISTIANO) (03/01/19 2000)  Reason Not Mobilized: Unstable condition (ongoing titration of meds) (03/06/19 2200)  Mobilization Comments: A-fib RVR (03/04/19 0800)    Events/Summary/Plan: No ventilator changes made.

## 2019-03-08 NOTE — PROGRESS NOTES
Critical Care Progress Note    Date of admission  2/28/2019    Chief Complaint  63 y.o. female admitted 2/28/2019 with abdominal pain.    Hospital Course    This lady was admitted to the ICU with septic shock due to a UTI.      Interval Problem Update  Reviewed last 24 hour events:      Vent   Follows  Moves all 4  Dex off  Fent 50  A-flutter 70-90  Westley at 40  Amio 0.5  OG to suction  LR 83  Fever 101.3  SBT - RSBI 23, NIF -40, FVC 1.8  PEEP 8    30%  Zosyn      Review of Systems  Review of Systems   Unable to perform ROS: Acuity of condition        Vital Signs for last 24 hours   Temp:  [36.6 °C (97.9 °F)-38.6 °C (101.5 °F)] 38.4 °C (101.1 °F)  Pulse:  [] 79  Resp:  [5-32] 5  SpO2:  [84 %-100 %] 94 %    Hemodynamic parameters for last 24 hours       Respiratory Information for the last 24 hours  Rivas Vent Mode: APVCMV  Rate (breaths/min): 22  Vt Target (mL): 390  PEEP/CPAP: 8  FiO2: 30  P MEAN: 11  Control VTE (exp VT): 385    Physical Exam   Physical Exam   Constitutional:   On ventilator   HENT:   Head: Normocephalic and atraumatic.   Right Ear: External ear normal.   Left Ear: External ear normal.   Mouth/Throat: Oropharynx is clear and moist.   Eyes: Pupils are equal, round, and reactive to light. Conjunctivae are normal. Right eye exhibits no discharge. Left eye exhibits no discharge.   Neck: Neck supple. No JVD present. No tracheal deviation present.   Cardiovascular: Intact distal pulses.  Exam reveals no gallop.    Atrial flutter   Pulmonary/Chest: She has no wheezes. She has rales (Scattered crackles).   Abdominal: She exhibits distension. There is no tenderness. There is no rebound.   Obese.  Mildly tender.  No peritoneal signs.   Musculoskeletal: She exhibits no tenderness.   No clubbing or cyanosis   Neurological:   Awake and alert.  Nods.  Follows.  Moves all 4 extremities.   Skin: Skin is warm and dry. She is not diaphoretic. No erythema.       Medications  Current Facility-Administered  Medications   Medication Dose Route Frequency Provider Last Rate Last Dose   • calcium GLUConate 1 g in  mL IVPB  1 g Intravenous Once Gabriel Kincaid M.D.       • phenylephrine (STIVEN-SYNEPHRINE) 40,000 mcg in D5W 250 mL Infusion  0-300 mcg/min Intravenous Continuous Fabricio Ivey M.D. 26.3 mL/hr at 03/08/19 0124 70 mcg/min at 03/08/19 0124   • ondansetron (ZOFRAN ODT) dispertab 4 mg  4 mg Enteral Tube Q4HRS PRN Fabricio Ivey M.D.       • senna-docusate (PERICOLACE or SENOKOT S) 8.6-50 MG per tablet 2 Tab  2 Tab Enteral Tube BID Fabricio Ivey M.D.   Stopped at 03/06/19 1715    And   • polyethylene glycol/lytes (MIRALAX) PACKET 1 Packet  1 Packet Enteral Tube QDAY PRN Fabricio Ivey M.D.        And   • magnesium hydroxide (MILK OF MAGNESIA) suspension 30 mL  30 mL Enteral Tube QDAY PRN Fabricio Ivey M.D.        And   • bisacodyl (DULCOLAX) suppository 10 mg  10 mg Rectal QDAY PRN Fabricio Ivey M.D.       • promethazine (PHENERGAN) tablet 12.5-25 mg  12.5-25 mg Enteral Tube Q4HRS PRN Fabricio Ivey M.D.       • dexmedetomidine (PRECEDEX) 400 mcg/100mL NS premix infusion  0.1-1.5 mcg/kg/hr Intravenous Continuous Gabriel Kincaid M.D. 45.8 mL/hr at 03/08/19 0349 1.5 mcg/kg/hr at 03/08/19 0349   • metoprolol (LOPRESSOR) tablet 25 mg  25 mg Enteral Tube TWICE DAILY Zhou Longo M.D.   Stopped at 03/06/19 0600   • fentaNYL (SUBLIMAZE) 50 mcg/mL in 50mL (Continuous Infusion)   Intravenous Continuous Fabricio Ivey M.D. 4 mL/hr at 03/08/19 0115 200 mcg/hr at 03/08/19 0115   • fentaNYL (SUBLIMAZE) injection 25 mcg  25 mcg Intravenous Q HOUR PRN Fabricio Ivey M.D.        Or   • fentaNYL (SUBLIMAZE) injection 50 mcg  50 mcg Intravenous Q HOUR PRN Fabricio Ivey M.D.   50 mcg at 03/07/19 1244    Or   • fentaNYL (SUBLIMAZE) injection 100 mcg  100 mcg Intravenous Q HOUR PRN Fabricio Ivey M.D.   100 mcg at 03/07/19 0551   • Respiratory  Care per Protocol   Nebulization Continuous RT Fabricio Ivey M.D.       • MD Alert...ICU Electrolyte Replacement per Pharmacy   Other PHARMACY TO DOSE Fabricio Ivey M.D.       • ipratropium-albuterol (DUONEB) nebulizer solution  3 mL Nebulization Q2HRS PRN (RT) Fabricio Ivey M.D.       • piperacillin-tazobactam (ZOSYN) 4.5 g in  mL IVPB  4.5 g Intravenous Q8HRS Fabricio Ivey M.D.   Stopped at 03/08/19 0124   • vasopressin (VASOSTRICT) 20 Units in  mL Infusion  0.03 Units/min Intravenous Continuous Fabricio Ivey M.D.   Stopped at 03/05/19 2230   • amiodarone (CORDARONE) 450 mg in D5W 250 mL Infusion  0.5-1 mg/min Intravenous Continuous Fabricio Ivey M.D. 17 mL/hr at 03/07/19 1532 0.5 mg/min at 03/07/19 1532   • gabapentin (NEURONTIN) capsule 300 mg  300 mg Enteral Tube TID Fabricio Shipley D.O.   Stopped at 03/06/19 0600   • omeprazole 2 mg/mL in sodium bicarbonate (PRILOSEC) oral susp 40 mg  40 mg Enteral Tube DAILY Fabricio Shipley D.O.   Stopped at 03/06/19 0600   • acetaminophen (TYLENOL) tablet 650 mg  650 mg Enteral Tube Q4HRS PRN KWAKU Granger.O.   650 mg at 03/08/19 0302   • atorvastatin (LIPITOR) tablet 40 mg  40 mg Enteral Tube Q EVENING Fabricio Shipley D.O.   Stopped at 03/06/19 1715   • levothyroxine (SYNTHROID) tablet 150 mcg  150 mcg Enteral Tube AM ES Fabricio Shipley D.O.   Stopped at 03/06/19 0600   • enoxaparin (LOVENOX) inj 40 mg  40 mg Subcutaneous DAILY Fabricio Ivey M.D.   Stopped at 03/06/19 0541   • lactated ringers infusion   Intravenous Continuous Ace Patiño M.D. 83 mL/hr at 03/06/19 0545     • ondansetron (ZOFRAN) syringe/vial injection 4 mg  4 mg Intravenous Q4HRS PRN Kareem Solis M.D.   4 mg at 03/06/19 2248   • promethazine (PHENERGAN) suppository 12.5-25 mg  12.5-25 mg Rectal Q4HRS PRN Kareem Solis M.D.       • prochlorperazine (COMPAZINE) injection 5-10 mg  5-10 mg Intravenous Q4HRS PRN Kareem  NINFA Solis M.D.       • insulin regular (HUMULIN R) injection 1-6 Units  1-6 Units Subcutaneous Q6HRS Kareem Solis M.D.   1 Units at 03/08/19 0038    And   • dextrose 50% (D50W) injection 25 mL  25 mL Intravenous Q15 MIN PRN Kareem Solis M.D.           Fluids    Intake/Output Summary (Last 24 hours) at 03/08/19 0502  Last data filed at 03/08/19 0400   Gross per 24 hour   Intake          2520.99 ml   Output             2070 ml   Net           450.99 ml       Laboratory  Recent Labs      03/06/19   0044  03/06/19   0417  03/07/19   0449  03/08/19   0440   ISTATAPH  7.432  7.444  7.405  7.406   ISTATAPCO2  43.6*  42.0*  38.8*  36.2   ISTATAPO2  241*  168*  92*  65   ISTATATCO2  30  30  26  24   BONPXYC5VVI  100*  100*  97  93   ISTATARTHCO3  29.1*  28.8*  24.3  22.7   ISTATARTBE  4*  4*  0  -2   ISTATTEMP  100.1 F  see below  100.6 F  101.1 F   ISTATFIO2  100  60  40  30   ISTATSPEC  Arterial  Arterial  Arterial  Arterial   ISTATAPHTC  7.420   --   7.389*  7.386*   [id number]  245*   --   99*  71     Recent Labs      03/05/19   0755   TROPONINI  0.01     Recent Labs      03/06/19   0209 03/07/19   0500  03/08/19   0310   SODIUM  148*  144  142   POTASSIUM  3.8  3.6  3.9   CHLORIDE  110  109  112   CO2  29  27  27   BUN  16  18  18   CREATININE  0.90  1.38  1.21   MAGNESIUM  1.9  2.0   --    PHOSPHORUS   --    --   1.9*   CALCIUM  8.1*  7.7*  6.8*     Recent Labs      03/05/19   2130  03/06/19   0209  03/07/19   0500  03/08/19   0310   ALTSGPT  11   --    --    --    ASTSGOT  27   --    --    --    ALKPHOSPHAT  90   --    --    --    TBILIRUBIN  0.7   --    --    --    PREALBUMIN   --   <3.0*   --    --    GLUCOSE  222*  149*  160*  157*     Recent Labs      03/05/19   2130  03/06/19   0209  03/07/19   0500   WBC  8.1  6.6  10.3   NEUTSPOLYS  61.10  58.80  51.70   LYMPHOCYTES  26.50  31.60  20.70*   MONOCYTES  4.40  6.10  6.90   EOSINOPHILS  0.00  0.00  2.60   BASOPHILS  0.00  0.00  2.60*   ASTSGOT  27   --     --    ALTSGPT  11   --    --    ALKPHOSPHAT  90   --    --    TBILIRUBIN  0.7   --    --      Recent Labs      03/05/19   2130  03/06/19   0209  03/07/19   0500   RBC  3.87*  3.37*  3.25*   HEMOGLOBIN  11.0*  9.6*  9.4*   HEMATOCRIT  34.3*  30.1*  29.9*   PLATELETCT  296  249  311       Imaging  X-Ray:  I have personally reviewed the images and compared with prior images. and My impression is: Improved bilateral opacities    Assessment/Plan  * Septic shock ([facility])- (present on admission)   Assessment & Plan    Severe sepsis with associated acute encephalopathy and acute kidney injury  Source of sepsis is not clear  Blood cultures remain negative  Continue Zosyn  Titrating phenylephrine to keep mean arterial pressure greater than 65     Acute hypoxemic respiratory failure ([facility])   Assessment & Plan    Intubated on 3/5  Appropriate ventilator bundles are in place  Continue vent support  SBT as tolerated     Atrial fibrillation and flutter ([facility])   Assessment & Plan    New onset atrial flutter/fibrillation  Rate is controlled  Loaded with digoxin on 3/6  Optimize potassium and magnesium  Continue amiodarone drip  TSH is normal  Echo unremarkable     Abdominal distention   Assessment & Plan    CT of abdomen/pelvis reveals dilated cecum and right colon  She has colonic pseudoobstruction  Neostigmine on 3/7 and again on 3/8  She is having bowel movements and flatus     Urinary tract infection- (present on admission)   Assessment & Plan    Urine culture with E. coli  Change Zosyn     Acute encephalopathy   Assessment & Plan    Suspect due to sepsis and metabolic  MRI without evidence of acute stroke  Ammonia level normal  Improved  Limit sedatives     JOSE (acute kidney injury) ([facility])   Assessment & Plan    Resolved     DM (diabetes mellitus) ([facility])- (present on admission)   Assessment & Plan    Continue sliding scale insulin     Hypokalemia   Assessment & Plan    Replete potassium          VTE:  Lovenox  Ulcer: PPI  Lines:  Central Line  Ongoing indication addressed and [place]tree Catheter  Ongoing indication addressed    I have performed a physical exam and reviewed and updated ROS and Plan today (3/8/2019). In review of yesterday's note (3/7/2019), there are no changes except as documented above.    I have assessed and reassessed her respiratory status with spontaneous breathing trials and ventilator adjustments, airway mechanics, ventilator waveforms, blood pressure, hemodynamics, cardiovascular status with titration of phenylephrine and neurologic status.  She is at increased risk for worsening respiratory, cardiovascular, gastrointestinal system dysfunction.    Discussed patient condition and risk of morbidity and/or mortality with Hospitalist, RN, RT, Pharmacy, Charge nurse / hot rounds and QA team     The patient remains critically ill.  Critical care time = 35 minutes minutes in directly providing and coordinating critical care and extensive data review.  No time overlap and excludes procedures.    Fabricio Ivey MD  Pulmonary and Critical Care Medicine

## 2019-03-08 NOTE — CARE PLAN
Problem: Communication  Goal: The ability to communicate needs accurately and effectively will improve    Intervention: [place] patient and significant other/support system to call light to alert staff of needs  Educated the pt on how to use the call light and to not attempt to get out of bed without the assistance of a staff member.   Intervention: Educate patient and significant other/support system about the plan of care, procedures, treatments, medications and allow for questions  Educated the pt on the POC and answered all questions.

## 2019-03-08 NOTE — DIETARY
Nutrition Services  Day 8 of admit.  Sonya Segovia is a 63 y.o. female bieng treated by GI for colonic pseudo obstruction. TF held after cortrak placed - No significant nutrition since admit (). Pt has NG to suction. Pt has several documented BMs last few days. Weight stable. Propofol discontinued.    Spoke with Dr. [facility] ([facility]) this morning regarding nutrition plan. MD planning to see pt today and start trophic feed if pt is having BMs.     Calculation/Equation: MSJ x 1 = 1730 kcal. 65 - 70% = 1125 - 1260. Based on wt of 113 kg (suspected dry wt)  Total Calories / day: 1243 - 1582 (Calories / k - 14)  Total Grams Protein / day: 127 - 170 (Grams Protein / ABW k.2 - 1.5; 1.5 - 2 IBW = 95 - 127g)      Plan/Recommend:  1. Start TF per MD when medically appropriate.   2. Peptamen Intense VHP -  final goal rate of 75 ml/hr which provides: 1800 kcal, 166 g of protein and 1512 ml free water.   3. Fluids per MD.        RD monitoring

## 2019-03-08 NOTE — CARE PLAN
Problem: Ventilation Defect:  Goal: Ability to achieve and maintain unassisted ventilation or tolerate decreased levels of ventilator support  Outcome: PROGRESSING AS EXPECTED  Adult Ventilation Update    Total Vent Days: 4    Patient Lines/Drains/Airways Status    Active Airway     Name: Placement date: Placement time: Site: Days:    Airway ETT Oral 7.5 03/05/19 2145   Oral   2            Patient tolerated SBT  This shift on settings of PS 5 PEEP 8 30%.    #FVC / Vital Capacity (liters) : 1.87 (03/08/19 0731)  NIF (cm H2O) : -40 (03/08/19 0731)  Rapid Shallow Breathing Index (RR/VT): 23 (03/08/19 0731)  Plateau Pressure (Q Shift): 16 (03/07/19 1910)  Static Compliance (ml / cm H2O): 150 (03/08/19 1431)       Sputum Amount: Small (03/08/19 1431)  Sputum Color: Yellow (03/08/19 1431)  Sputum Consistency: Thick (03/08/19 1431)    Events/Summary/Plan: Pt tolerated SBT settings well this shift, resting comfortably as tolerated per Dr. Wiseman. Ready to extubate, but the extubation is on hold per MD for clearance from GI procedures.

## 2019-03-08 NOTE — PROGRESS NOTES
Surgery  Being treated by GI for colonic pseudo obstruction  Surgery signing off  Call with questions

## 2019-03-08 NOTE — PROGRESS NOTES
Lab called with critical result of calcium- 6.8 at 0412. Critical lab result read back.   Dr. Kincaid notified of critical lab result at 0430.  Critical lab result read back by Dr. Kincaid. MD ordered 1gm Calcium gluconate

## 2019-03-08 NOTE — PROGRESS NOTES
Gastroenterology Progress Note     Author: Bhanu ASHLEY Buddyhiginio   Date & Time Created: 3/8/2019 3:01 PM    Chief Complaint:  Reason for Consult:      Colonic pseudo obstruction    History of Present Illness:    Patient is a 63 year female with past medical history of hypothyroidism, type 2 diabetes mellitus, hypertension and depression who was admitted on 02/28 for sepsis thought to be secondary to UTI. Patient is currently intubated, history obtained from chart review and family at bedside.  Patient had presented with symptoms of  abdominal pain, nausea/vomiting and syncopal episodes. She had also reported constipation. CT scan in the ER on 02/28 had showed significant constipation without evidence of obstruction. Repeat CT scan with gastrografin later that day showed no evidence of bowel perforation. She had worsening lactic acidosis initially which has now resolved.   She appears to have had some improvement initially before developing worsening abdominal pain and distension again 2 days back. CT scan   03/06 shows dilated small and large bowel with cecum measuring 9.2 cm. She has an NG tube to suction , last bowel movement this morning.      Interval History:  3/7/2019: rectal tube placed. Neostigmine given x 1 dose with some effect  3/8/2019: no significant issues overnight. Off sedation. No significant BM overnight per RN. Still intubated.         Review of Systems:  Review of Systems   Unable to perform ROS: Intubated       Physical Exam:  Physical Exam   Constitutional: She appears well-developed and well-nourished.   HENT:   Head: Normocephalic and atraumatic.   Eyes: Pupils are equal, round, and reactive to light. Conjunctivae and EOM are normal. Right eye exhibits no discharge. Left eye exhibits no discharge. No scleral icterus.   Neck: Normal range of motion. Neck supple. No JVD present. No tracheal deviation present. No thyromegaly present.   Cardiovascular: Normal rate and regular rhythm.  Exam reveals no  gallop and no friction rub.    No murmur heard.  Pulmonary/Chest: Effort normal and breath sounds normal. No stridor. No respiratory distress. She has no wheezes. She has no rales. She exhibits no tenderness.   Abdominal: Soft. She exhibits no distension and no mass. There is no tenderness. There is no rebound and no guarding.   Hypoactive bowel sound, still distended   Genitourinary: Rectal exam shows guaiac negative stool.   Musculoskeletal: She exhibits no edema, tenderness or deformity.   Lymphadenopathy:     She has no cervical adenopathy.   Neurological: She is alert. No cranial nerve deficit.   Skin: Skin is warm and dry. No rash noted. No erythema. No pallor.       Labs:  Recent Labs      03/06/19   0044  03/06/19   0417  03/07/19   0449  03/08/19   0440   ISTATAPH  7.432  7.444  7.405  7.406   ISTATAPCO2  43.6*  42.0*  38.8*  36.2   ISTATAPO2  241*  168*  92*  65   ISTATATCO2  30  30  26  24   [id number]  100*  100*  97  93   ISTATARTHCO3  29.1*  28.8*  24.3  22.7   ISTATARTBE  4*  4*  0  -2   ISTATTEMP  100.1 F  see below  100.6 F  101.1 F   ISTATFIO2  100  60  40  30   ISTATSPEC  Arterial  Arterial  Arterial  Arterial   ISTATAPHTC  7.420   --   7.389*  7.386*   [id number]  245*   --   99*  71         Recent Labs      03/06/19   0209 03/07/19   0500  03/08/19   0310   SODIUM  148*  144  142   POTASSIUM  3.8  3.6  3.9   CHLORIDE  110  109  112   CO2  29  27  27   BUN  16  18  18   CREATININE  0.90  1.38  1.21   MAGNESIUM  1.9  2.0   --    PHOSPHORUS   --    --   1.9*   CALCIUM  8.1*  7.7*  6.8*     Recent Labs      03/05/19   2130  03/06/19   0209  03/07/19   0500  03/08/19   0310   ALTSGPT  11   --    --    --    ASTSGOT  27   --    --    --    ALKPHOSPHAT  90   --    --    --    TBILIRUBIN  0.7   --    --    --    PREALBUMIN   --   <3.0*   --    --    GLUCOSE  222*  149*  160*  157*     Recent Labs      03/06/19   0209  03/07/19   0500  03/08/19   0310   RBC  3.37*  3.25*  3.09*   HEMOGLOBIN  9.6*   9.4*  8.9*   HEMATOCRIT  30.1*  29.9*  29.0*   PLATELETCT  249  311  374     Recent Labs      19   2130  19   0209  19   0500  19   0310   WBC  8.1  6.6  10.3  9.9   NEUTSPOLYS  61.10  58.80  51.70  48.70   LYMPHOCYTES  26.50  31.60  20.70*  30.10   MONOCYTES  4.40  6.10  6.90  2.70   EOSINOPHILS  0.00  0.00  2.60  0.90   BASOPHILS  0.00  0.00  2.60*  2.60*   ASTSGOT  27   --    --    --    ALTSGPT  11   --    --    --    ALKPHOSPHAT  90   --    --    --    TBILIRUBIN  0.7   --    --    --      Hemodynamics:  Temp (24hrs), Av.5 °C (101.3 °F), Min:38.4 °C (101.1 °F), Max:38.6 °C (101.5 °F)  Temperature: (!) 38.5 °C (101.3 °F)  Pulse  Av.7  Min: 40  Max: 151Heart Rate (Monitored): (!) 130  NIBP: 125/66     Respiratory:  Rivas Vent Mode: Spont, Rate (breaths/min): 22, PEEP/CPAP: 8, FiO2: 30, P Peak (PIP): 16, P MEAN: 9.1 Respiration: (!) 8, Pulse Oximetry: 100 %     Work Of Breathing / Effort: Vented  RUL Breath Sounds: Crackles, RML Breath Sounds: Crackles, RLL Breath Sounds: Diminished, CHELITA Breath Sounds: Crackles, LLL Breath Sounds: Diminished  Fluids:    Intake/Output Summary (Last 24 hours) at 19 1501  Last data filed at 19 0800   Gross per 24 hour   Intake          2934.07 ml   Output             1000 ml   Net          1934.07 ml     Weight: 122.4 kg (269 lb 13.5 oz)  GI/Nutrition:  Orders Placed This Encounter   Procedures   • Diet NPO     Standing Status:   Standing     Number of Occurrences:   1     Order Specific Question:   Type:     Answer:   Now [1]     Order Specific Question:   Restrict to:     Answer:   Strict [1]     Medical Decision Making, by Problem:  Active Hospital Problems    Diagnosis   • *Septic shock (HCC) [A41.9, R65.21]   • Acute hypoxemic respiratory failure (formerly [facility]) [J96.01]   • Atrial fibrillation and flutter (formerly [facility]) [I48.91, I48.92]   • Urinary tract infection [N39.0]   • Abdominal distention [R14.0]   • Atelectasis [J98.11]   • Acute  encephalopathy [G93.40]   • Depression [F32.9]   • DM (diabetes mellitus) ([facility]) [E11.9]   • JOSE (acute kidney injury) ([facility]) [N17.9]   • Hypomagnesemia [E83.42]   • Hypocalcemia [E83.51]   • Hypokalemia [E87.6]   • Class 2 severe obesity due to excess calories with serious comorbidity and body mass index (BMI) of 37.0 to 37.9 in adult ([facility]) [E66.01, Z68.37]   • Hypotension [I95.9]   • Abnormal LFTs [R94.5]   • Lactic acidosis [E87.2]       Impressions:  1. Acute colonic pseudo obstruction. Patient is NPO with NG tube to suction; no rectal tube though. S/p Neostigimine 2mg on 3/7/2019 with passage of 450cc of stool; no no stool output since. Still distended. Due to habitus, difficult with visualization of the bowel.  2. Sepsis secondary to UTI. On pressors, vanc and zosyn  3. Atrial fibrillation, was previously rate controlled- on Amiodarone  4. Respiratory failure, intubated and mechanically ventilated  5. Diabetes mellitus  6. Hypothyroidism.      Plan:  1. Given concern of patient ongoing distention, not passing significant stool since initial neostigmine discussed with patient and family about repeat neostigmine vs colonic depression tube. Discussed ASGE guidelines; patient and family wants to trial neostigmine again. Ordered. Monitor for arrhythmia. Contraindication re-reviewed, no absolute contraindication; monitor for bradycardia. If no improvement, indication for decompression tube placement  2. Serial abdominal exam  3. Minimize pain medications and sedative. Frequent turns every 2 hours.  4. Due to concern for ongoing abdominal distention, hold off on PO feeds. Consider TPN if necessary.      Quality-Core Measures

## 2019-03-09 ENCOUNTER — APPOINTMENT (OUTPATIENT)
Dept: RADIOLOGY | Facility: MEDICAL CENTER | Age: 64
DRG: 871 | End: 2019-03-09
Attending: INTERNAL MEDICINE
Payer: MEDICARE

## 2019-03-09 LAB
ACTION RANGE TRIGGERED IACRT: NO
ANION GAP SERPL CALC-SCNC: 8 MMOL/L (ref 0–11.9)
BASE EXCESS BLDA CALC-SCNC: -1 MMOL/L (ref -4–3)
BASOPHILS # BLD AUTO: 0 % (ref 0–1.8)
BASOPHILS # BLD: 0 K/UL (ref 0–0.12)
BODY TEMPERATURE: ABNORMAL DEGREES
BUN SERPL-MCNC: 15 MG/DL (ref 8–22)
CALCIUM SERPL-MCNC: 6.8 MG/DL (ref 8.5–10.5)
CHLORIDE SERPL-SCNC: 110 MMOL/L (ref 96–112)
CO2 BLDA-SCNC: 25 MMOL/L (ref 20–33)
CO2 SERPL-SCNC: 25 MMOL/L (ref 20–33)
CREAT SERPL-MCNC: 0.99 MG/DL (ref 0.5–1.4)
EOSINOPHIL # BLD AUTO: 0.06 K/UL (ref 0–0.51)
EOSINOPHIL NFR BLD: 0.9 % (ref 0–6.9)
ERYTHROCYTE [DISTWIDTH] IN BLOOD BY AUTOMATED COUNT: 51.6 FL (ref 35.9–50)
GLUCOSE BLD-MCNC: 110 MG/DL (ref 65–99)
GLUCOSE BLD-MCNC: 112 MG/DL (ref 65–99)
GLUCOSE BLD-MCNC: 118 MG/DL (ref 65–99)
GLUCOSE BLD-MCNC: 120 MG/DL (ref 65–99)
GLUCOSE SERPL-MCNC: 129 MG/DL (ref 65–99)
HCO3 BLDA-SCNC: 24.2 MMOL/L (ref 17–25)
HCT VFR BLD AUTO: 29.1 % (ref 37–47)
HGB BLD-MCNC: 8.9 G/DL (ref 12–16)
HOROWITZ INDEX BLDA+IHG-RTO: 357 MM[HG]
INST. QUALIFIED PATIENT IIQPT: YES
LYMPHOCYTES # BLD AUTO: 1.92 K/UL (ref 1–4.8)
LYMPHOCYTES NFR BLD: 27.8 % (ref 22–41)
MANUAL DIFF BLD: NORMAL
MCH RBC QN AUTO: 28.1 PG (ref 27–33)
MCHC RBC AUTO-ENTMCNC: 30.6 G/DL (ref 33.6–35)
MCV RBC AUTO: 91.8 FL (ref 81.4–97.8)
MONOCYTES # BLD AUTO: 0.12 K/UL (ref 0–0.85)
MONOCYTES NFR BLD AUTO: 1.7 % (ref 0–13.4)
MORPHOLOGY BLD-IMP: NORMAL
NEUTROPHILS # BLD AUTO: 4.8 K/UL (ref 2–7.15)
NEUTROPHILS NFR BLD: 62.6 % (ref 44–72)
NEUTS BAND NFR BLD MANUAL: 7 % (ref 0–10)
NRBC # BLD AUTO: 0 K/UL
NRBC BLD-RTO: 0 /100 WBC
O2/TOTAL GAS SETTING VFR VENT: 30 %
OVALOCYTES BLD QL SMEAR: NORMAL
PCO2 BLDA: 39.4 MMHG (ref 26–37)
PCO2 TEMP ADJ BLDA: 37.1 MMHG (ref 26–37)
PH BLDA: 7.4 [PH] (ref 7.4–7.5)
PH TEMP ADJ BLDA: 7.42 [PH] (ref 7.4–7.5)
PHOSPHATE SERPL-MCNC: 3.4 MG/DL (ref 2.5–4.5)
PLATELET # BLD AUTO: 382 K/UL (ref 164–446)
PLATELET BLD QL SMEAR: NORMAL
PMV BLD AUTO: 9.7 FL (ref 9–12.9)
PO2 BLDA: 107 MMHG (ref 64–87)
PO2 TEMP ADJ BLDA: 99 MMHG (ref 64–87)
POIKILOCYTOSIS BLD QL SMEAR: NORMAL
POTASSIUM SERPL-SCNC: 3.8 MMOL/L (ref 3.6–5.5)
RBC # BLD AUTO: 3.17 M/UL (ref 4.2–5.4)
RBC BLD AUTO: PRESENT
SAO2 % BLDA: 98 % (ref 93–99)
SODIUM SERPL-SCNC: 143 MMOL/L (ref 135–145)
SPECIMEN DRAWN FROM PATIENT: ABNORMAL
WBC # BLD AUTO: 6.9 K/UL (ref 4.8–10.8)

## 2019-03-09 PROCEDURE — A9270 NON-COVERED ITEM OR SERVICE: HCPCS | Performed by: HOSPITALIST

## 2019-03-09 PROCEDURE — 700105 HCHG RX REV CODE 258: Performed by: INTERNAL MEDICINE

## 2019-03-09 PROCEDURE — 700102 HCHG RX REV CODE 250 W/ 637 OVERRIDE(OP): Performed by: HOSPITALIST

## 2019-03-09 PROCEDURE — 770022 HCHG ROOM/CARE - ICU (200)

## 2019-03-09 PROCEDURE — 82962 GLUCOSE BLOOD TEST: CPT | Mod: 91

## 2019-03-09 PROCEDURE — 80048 BASIC METABOLIC PNL TOTAL CA: CPT

## 2019-03-09 PROCEDURE — 94150 VITAL CAPACITY TEST: CPT

## 2019-03-09 PROCEDURE — 700102 HCHG RX REV CODE 250 W/ 637 OVERRIDE(OP): Performed by: INTERNAL MEDICINE

## 2019-03-09 PROCEDURE — 36600 WITHDRAWAL OF ARTERIAL BLOOD: CPT

## 2019-03-09 PROCEDURE — 82803 BLOOD GASES ANY COMBINATION: CPT

## 2019-03-09 PROCEDURE — 71045 X-RAY EXAM CHEST 1 VIEW: CPT

## 2019-03-09 PROCEDURE — 99291 CRITICAL CARE FIRST HOUR: CPT | Performed by: INTERNAL MEDICINE

## 2019-03-09 PROCEDURE — 74018 RADEX ABDOMEN 1 VIEW: CPT

## 2019-03-09 PROCEDURE — 84100 ASSAY OF PHOSPHORUS: CPT

## 2019-03-09 PROCEDURE — C9113 INJ PANTOPRAZOLE SODIUM, VIA: HCPCS | Performed by: INTERNAL MEDICINE

## 2019-03-09 PROCEDURE — 700111 HCHG RX REV CODE 636 W/ 250 OVERRIDE (IP): Performed by: INTERNAL MEDICINE

## 2019-03-09 PROCEDURE — 85007 BL SMEAR W/DIFF WBC COUNT: CPT

## 2019-03-09 PROCEDURE — 94003 VENT MGMT INPAT SUBQ DAY: CPT

## 2019-03-09 PROCEDURE — A9270 NON-COVERED ITEM OR SERVICE: HCPCS | Performed by: INTERNAL MEDICINE

## 2019-03-09 PROCEDURE — 85027 COMPLETE CBC AUTOMATED: CPT

## 2019-03-09 RX ORDER — ZOLPIDEM TARTRATE 5 MG/1
5 TABLET ORAL NIGHTLY PRN
Status: DISCONTINUED | OUTPATIENT
Start: 2019-03-09 | End: 2019-03-11

## 2019-03-09 RX ORDER — CALCIUM CHLORIDE 100 MG/ML
1 INJECTION INTRAVENOUS; INTRAVENTRICULAR ONCE
Status: DISCONTINUED | OUTPATIENT
Start: 2019-03-09 | End: 2019-03-09

## 2019-03-09 RX ADMIN — GABAPENTIN 300 MG: 300 CAPSULE ORAL at 11:45

## 2019-03-09 RX ADMIN — PANTOPRAZOLE SODIUM 40 MG: 40 INJECTION, POWDER, LYOPHILIZED, FOR SOLUTION INTRAVENOUS at 05:14

## 2019-03-09 RX ADMIN — PIPERACILLIN AND TAZOBACTAM 4.5 G: 4; .5 INJECTION, POWDER, LYOPHILIZED, FOR SOLUTION INTRAVENOUS; PARENTERAL at 13:05

## 2019-03-09 RX ADMIN — AMIODARONE HYDROCHLORIDE 0.5 MG/MIN: 50 INJECTION, SOLUTION INTRAVENOUS at 14:25

## 2019-03-09 RX ADMIN — GABAPENTIN 300 MG: 300 CAPSULE ORAL at 05:14

## 2019-03-09 RX ADMIN — ZOLPIDEM TARTRATE 5 MG: 5 TABLET ORAL at 23:21

## 2019-03-09 RX ADMIN — PIPERACILLIN AND TAZOBACTAM 4.5 G: 4; .5 INJECTION, POWDER, LYOPHILIZED, FOR SOLUTION INTRAVENOUS; PARENTERAL at 05:13

## 2019-03-09 RX ADMIN — CALCIUM CHLORIDE 1 G: 100 INJECTION, SOLUTION INTRAVENOUS at 13:05

## 2019-03-09 RX ADMIN — METOPROLOL TARTRATE 25 MG: 25 TABLET, FILM COATED ORAL at 05:14

## 2019-03-09 RX ADMIN — SODIUM CHLORIDE, POTASSIUM CHLORIDE, SODIUM LACTATE AND CALCIUM CHLORIDE: 600; 310; 30; 20 INJECTION, SOLUTION INTRAVENOUS at 09:46

## 2019-03-09 RX ADMIN — LEVOTHYROXINE SODIUM 150 MCG: 75 TABLET ORAL at 05:14

## 2019-03-09 RX ADMIN — FENTANYL CITRATE 100 MCG: 50 INJECTION INTRAMUSCULAR; INTRAVENOUS at 09:35

## 2019-03-09 RX ADMIN — ENOXAPARIN SODIUM 40 MG: 100 INJECTION SUBCUTANEOUS at 05:14

## 2019-03-09 ASSESSMENT — PULMONARY FUNCTION TESTS: FVC: 1.8

## 2019-03-09 ASSESSMENT — COPD QUESTIONNAIRES
COPD SCREENING SCORE: 4
HAVE YOU SMOKED AT LEAST 100 CIGARETTES IN YOUR ENTIRE LIFE: YES
DO YOU EVER COUGH UP ANY MUCUS OR PHLEGM?: NO/ONLY WITH OCCASIONAL COLDS OR INFECTIONS
DURING THE PAST 4 WEEKS HOW MUCH DID YOU FEEL SHORT OF BREATH: NONE/LITTLE OF THE TIME

## 2019-03-09 ASSESSMENT — LIFESTYLE VARIABLES: EVER_SMOKED: YES

## 2019-03-09 NOTE — CARE PLAN
Problem: Safety  Goal: Free from accidental injury    Intervention: Initiate Fall Precautions  Bed alarm on, bed in lowest position, appropriate signs in place, call light in reach, patient wearing treaded socks, patient educated to call before attempting to exit bed.       Problem: Pain  Goal: Alleviation of Pain or a reduction in pain to the patient's comfort goal  Outcome: PROGRESSING AS EXPECTED  Pain assessed q2 hours, patient non pharmaologic methods used first for pain management, if unsuccessful, use of pharmacologic methods implemented according to interdisciplinary teams recommendation.

## 2019-03-09 NOTE — PROGRESS NOTES
Critical Care Progress Note    Date of admission  2/28/2019    Chief Complaint  63 y.o. female admitted 2/28/2019 with abdominal pain.    Hospital Course    This lady was admitted to the ICU with septic shock due to a UTI.      Interval Problem Update  Reviewed last 24 hour events:      Follows  Awake  Moves all 4  Fent off  Prop off  SR  PAF  Replete Ca  Westley titrating  OG to suction  Vent 5  SBT  FVC 1.8, RSBI 13, NIF -38  Zosyn - will stop      Review of Systems  Review of Systems   Unable to perform ROS: Acuity of condition        Vital Signs for last 24 hours   Temp:  [36.2 °C (97.2 °F)-38.1 °C (100.6 °F)] 36.2 °C (97.2 °F)  Pulse:  [] 83  Resp:  [0-39] 21  SpO2:  [90 %-100 %] 98 %    Hemodynamic parameters for last 24 hours  CVP:  [6 MM HG-8 MM HG] 7 MM HG    Respiratory Information for the last 24 hours  Rivas Vent Mode: APVCMV  Rate (breaths/min): 22  Vt Target (mL): 390  PEEP/CPAP: 8  FiO2: 30  P Support: 5  P MEAN: 11  Length of Weaning Trial (Hours): 9.5  Control VTE (exp VT): 456    Physical Exam   Physical Exam   Constitutional:   On ventilator   HENT:   Right Ear: External ear normal.   Left Ear: External ear normal.   Nose: Nose normal.   Mouth/Throat: No oropharyngeal exudate.   Eyes: Pupils are equal, round, and reactive to light. Right eye exhibits no discharge. Left eye exhibits no discharge. No scleral icterus.   Neck: Normal range of motion. Neck supple. No JVD present. No tracheal deviation present.   Cardiovascular: Intact distal pulses.  Exam reveals no friction rub.    Sinus rhythm   Pulmonary/Chest: She has no wheezes. She has rales (Improved crackles).   Abdominal: Bowel sounds are normal. She exhibits distension (Improved). There is no tenderness. There is no rebound.   Obese.  Mildly tender.  No peritoneal signs.   Musculoskeletal: She exhibits no deformity.   No clubbing or cyanosis   Neurological:   Much more awake and alert.  Nods.  Follows.  Calm.  Moves all 4 extremities.    Skin: Skin is warm and dry. No rash noted. She is not diaphoretic.       Medications  Current Facility-Administered Medications   Medication Dose Route Frequency Provider Last Rate Last Dose   • pantoprazole (PROTONIX) injection 40 mg  40 mg Intravenous DAILY Fabricio Ivey M.D.   40 mg at 03/08/19 1222   • Pharmacy Consult: Enteral tube insertion - review meds/change route/product selection   Other PHARMACY TO DOSE Fabricio Ivey M.D.       • phenylephrine (STIVEN-SYNEPHRINE) 40,000 mcg in D5W 250 mL Infusion  0-300 mcg/min Intravenous Continuous Fabricio Ivey M.D.   Stopped at 03/08/19 1222   • ondansetron (ZOFRAN ODT) dispertab 4 mg  4 mg Enteral Tube Q4HRS PRN Fabricio Ivey M.D.       • polyethylene glycol/lytes (MIRALAX) PACKET 1 Packet  1 Packet Enteral Tube QDAY PRN Fabricio Ivey M.D.   1 Packet at 03/08/19 1803   • promethazine (PHENERGAN) tablet 12.5-25 mg  12.5-25 mg Enteral Tube Q4HRS PRN Fabricio Ivey M.D.       • dexmedetomidine (PRECEDEX) 400 mcg/100mL NS premix infusion  0.1-1.5 mcg/kg/hr Intravenous Continuous Gabriel Kincaid M.D.   Stopped at 03/08/19 0845   • metoprolol (LOPRESSOR) tablet 25 mg  25 mg Enteral Tube TWICE DAILY Zhou Longo M.D.   25 mg at 03/08/19 1804   • fentaNYL (SUBLIMAZE) 50 mcg/mL in 50mL (Continuous Infusion)   Intravenous Continuous Fabricio Ivey M.D. 1.5 mL/hr at 03/08/19 2219 75 mcg/hr at 03/08/19 2219   • fentaNYL (SUBLIMAZE) injection 25 mcg  25 mcg Intravenous Q HOUR PRN Fabricio Ivey M.D.        Or   • fentaNYL (SUBLIMAZE) injection 50 mcg  50 mcg Intravenous Q HOUR PRN Fabricio Ivey M.D.   50 mcg at 03/07/19 1244    Or   • fentaNYL (SUBLIMAZE) injection 100 mcg  100 mcg Intravenous Q HOUR PRN Fabricio Ivey M.D.   100 mcg at 03/08/19 1943   • Respiratory Care per Protocol   Nebulization Continuous RT Fabricio Ivey M.D.       • MD Alert...ICU Electrolyte Replacement per  Pharmacy   Other PHARMACY TO DOSE Fabricio Ivey M.D.       • ipratropium-albuterol (DUONEB) nebulizer solution  3 mL Nebulization Q2HRS PRN (RT) Fabricio Ivey M.D.       • piperacillin-tazobactam (ZOSYN) 4.5 g in  mL IVPB  4.5 g Intravenous Q8HRS Fabricio Ivey M.D.   Stopped at 03/09/19 0109   • vasopressin (VASOSTRICT) 20 Units in  mL Infusion  0.03 Units/min Intravenous Continuous Fabricio Ivey M.D.   Stopped at 03/05/19 2230   • amiodarone (CORDARONE) 450 mg in D5W 250 mL Infusion  0.5-1 mg/min Intravenous Continuous Fabricio Ivey M.D. 17 mL/hr at 03/08/19 2144 0.5 mg/min at 03/08/19 2144   • gabapentin (NEURONTIN) capsule 300 mg  300 mg Enteral Tube TID Fabricio Shipley D.O.   300 mg at 03/08/19 1804   • acetaminophen (TYLENOL) tablet 650 mg  650 mg Enteral Tube Q4HRS PRN Fabricio Shipley D.O.   650 mg at 03/08/19 2250   • atorvastatin (LIPITOR) tablet 40 mg  40 mg Enteral Tube Q EVENING Fabricio Shipley D.O.   40 mg at 03/08/19 1631   • levothyroxine (SYNTHROID) tablet 150 mcg  150 mcg Enteral Tube AM ES Fabricio Shipley D.OAngela   Stopped at 03/06/19 0600   • enoxaparin (LOVENOX) inj 40 mg  40 mg Subcutaneous DAILY Fabricio Ivey M.D.   40 mg at 03/08/19 1632   • lactated ringers infusion   Intravenous Continuous Ace Patiño M.D. 83 mL/hr at 03/08/19 2109     • ondansetron (ZOFRAN) syringe/vial injection 4 mg  4 mg Intravenous Q4HRS PRN Kareem Solis M.D.   4 mg at 03/06/19 2248   • promethazine (PHENERGAN) suppository 12.5-25 mg  12.5-25 mg Rectal Q4HRS PRN Kareem Solis M.D.       • prochlorperazine (COMPAZINE) injection 5-10 mg  5-10 mg Intravenous Q4HRS PRSUHA Solis M.D.       • insulin regular (HUMULIN R) injection 1-6 Units  1-6 Units Subcutaneous Q6HRS Kareem Solis M.D.   Stopped at 03/08/19 0600    And   • dextrose 50% (D50W) injection 25 mL  25 mL Intravenous Q15 MIN PRN Kareem Solis M.D.            Fluids    Intake/Output Summary (Last 24 hours) at 03/09/19 0456  Last data filed at 03/09/19 0400   Gross per 24 hour   Intake          4341.99 ml   Output             3235 ml   Net          1106.99 ml       Laboratory  Recent Labs      03/07/19   0449  03/08/19   0440   ISTATAPH  7.405  7.406   ISTATAPCO2  38.8*  36.2   ISTATAPO2  92*  65   ISTATATCO2  26  24   [id number]  97  93   ISTATARTHCO3  24.3  22.7   ISTATARTBE  0  -2   ISTATTEMP  100.6 F  101.1 F   ISTATFIO2  40  30   ISTATSPEC  Arterial  Arterial   ISTATAPHTC  7.389*  7.386*   [id number]  99*  71         Recent Labs      03/07/19   0500  03/08/19   0310   SODIUM  144  142   POTASSIUM  3.6  3.9   CHLORIDE  109  112   CO2  27  27   BUN  18  18   CREATININE  1.38  1.21   MAGNESIUM  2.0   --    PHOSPHORUS   --   1.9*   CALCIUM  7.7*  6.8*     Recent Labs      03/07/19   0500  03/08/19   0310   GLUCOSE  160*  157*     Recent Labs      03/07/19   0500  03/08/19   0310   WBC  10.3  9.9   NEUTSPOLYS  51.70  48.70   LYMPHOCYTES  20.70*  30.10   MONOCYTES  6.90  2.70   EOSINOPHILS  2.60  0.90   BASOPHILS  2.60*  2.60*     Recent Labs      03/07/19   0500  03/08/19   0310   RBC  3.25*  3.09*   HEMOGLOBIN  9.4*  8.9*   HEMATOCRIT  29.9*  29.0*   PLATELETCT  311  374       Imaging  X-Ray:  I have personally reviewed the images and compared with prior images. and My impression is: Improving bibasilar opacification    Assessment/Plan  * Septic shock (HCC)- (present on admission)   Assessment & Plan    Severe sepsis with associated acute encephalopathy and acute kidney injury  The source of sepsis is not clear  Blood cultures are negative  UA is improved  Sputum culture negative  Titrating phenylephrine to keep mean arterial pressure greater than 65  Stop Zosyn and observe off antibiotics     Acute hypoxemic respiratory failure (HCC)   Assessment & Plan    Intubated on 3/5  All appropriate ventilator bundles are in place  Continue vent support  SBT as  tolerated     Atrial fibrillation and flutter (HCC)   Assessment & Plan    New onset atrial flutter/fibrillation  She is now in sinus rhythm  Loaded with digoxin on 3/6  Optimize magnesium and potassium  Continue amiodarone drip  Echocardiogram is unremarkable  TSH is normal     Abdominal distention   Assessment & Plan    CT of abdomen/pelvis reveals dilated cecum and right colon  She has colonic pseudoobstruction  Neostigmine on 3/7 and again on 3/8  Clinically improved     Urinary tract infection- (present on admission)   Assessment & Plan    Urine culture with E. coli  Stop Zosyn and observe off antibiotics     Acute encephalopathy   Assessment & Plan    Suspect due to sepsis and metabolic  MRI without evidence of acute stroke  Ammonia level normal  Resolving  Limit sedatives     DM (diabetes mellitus) ([facility])- (present on admission)   Assessment & Plan    Continue sliding scale insulin     Hypokalemia   Assessment & Plan    Replete potassium     Hypocalcemia   Assessment & Plan    Replete calcium          VTE:  Lovenox  Ulcer: PPI  Lines: Central Line  Ongoing indication addressed and Cr[place]tree Catheter  Ongoing indication addressed    I have performed a physical exam and reviewed and updated ROS and Plan today (3/9/2019). In review of yesterday's note (3/8/2019), there are no changes except as documented above.    I have assessed and reassessed her respiratory status with ventilator adjustments and spontaneous breathing trials, ventilator waveforms, airway mechanics, hemodynamics, blood pressure, cardiovascular status with titration of phenylephrine and neurologic status.  She is at increased risk for worsening cardiovascular, respiratory, gastrointestinal system dysfunction.    Discussed patient condition and risk of morbidity and/or mortality with Family, RN, RT, Pharmacy, Charge nurse / hot rounds and QA team     The patient remains critically ill.  Critical care time = 32 minutes minutes in directly providing and  coordinating critical care and extensive data review.  No time overlap and excludes procedures.    Fabricio Ivey MD  Pulmonary and Critical Care Medicine

## 2019-03-09 NOTE — CARE PLAN
Problem: Ventilation Defect:  Goal: Ability to achieve and maintain unassisted ventilation or tolerate decreased levels of ventilator support  Outcome: PROGRESSING SLOWER THAN EXPECTED  Adult Ventilation Update    Total Vent Days: 5    Patient Lines/Drains/Airways Status    Active Airway     Name: Placement date: Placement time: Site: Days:    Airway ETT Oral 7.5@21 03/05/19 2145   Oral     5              In the last 24 hours, the patient tolerated SBT for 9 1/2 on settings of 5/8.    #FVC / Vital Capacity (liters) : 1.87 (03/08/19 0731)  NIF (cm H2O) : -40 (03/08/19 0731)  Rapid Shallow Breathing Index (RR/VT): 23 (03/08/19 0731)  Plateau Pressure (Q Shift): 13 (03/08/19 1911)  Static Compliance (ml / cm H2O): 110.4 (03/09/19 0306    Sputum/Suction   Cough: Productive (03/09/19 0400)  Sputum Amount: Small (03/09/19 0400)  Sputum Color: White;Tan (03/09/19 0400)  Sputum Consistency: Thin;Thick (03/09/19 0400)    Mobility  Level of Mobility: Level IV (03/09/19 0400)  Activity Performed: Unable to mobilize (03/09/19 0400)  Time Activity Tolerated: 5 min (03/07/19 2000)  Distance Per Occurrence (ft.): 1 feet (02/28/19 2200)  # of Times Distance was Traveled: 2 (03/01/19 0000)  Assistance / Tolerance: Assistance of Two or More (03/07/19 2000)  Pt Calls for Assistance: No (03/07/19 2000)  Staff Present for Mobilization: RN;CNA (03/07/19 2000)  Gait: Unsteady (03/05/19 2000)  Assistive Devices:  (CHRISTIANO) (03/01/19 2000)  Reason Not Mobilized: Patient refused - pain (Abdominal pain on movement.) (03/09/19 0400)  Mobilization Comments: A-fib RVR (03/04/19 0800)    Events/Summary/Plan: No ventilator changes made.

## 2019-03-09 NOTE — PROGRESS NOTES
· 2 RN skin check complete with STACEY Junior.  · Devices in place scds, ekg leads and electrodes, bp cuff, o2 sensor, OG, cortack, et tube and holister, central line, and bms.  · Skin assessed under devices.  · No confirmed pressure ulcers found.  · Moisture fissure, blisters noted to bilateral posterior thighs and inner butt cracks, heels boggy, under cortrack tape has abrasions, L upper chest abrasion.  · The following interventions in place  q2hr turns, q shift skin check, roopa cream added to inner buttocks and posterior thighs, q2 hr device repositioning, padding with pillow case around bms, q2hr et tube repositioning, pillows used to float extremities, and floated cortrack.

## 2019-03-09 NOTE — PROGRESS NOTES
· 2 RN skin check complete with STACEY Junior.  · Devices in place scds, ekg leads and electrodes, bp cuff, o2 sensor, OG, cortack, et tube and holister, central line, and bms.  · Skin assessed under devices.  · No confirmed pressure ulcers found.  · Moisture fissure, blisters noted to bilateral posterior thighs and inner butt crack, heels boggy, under cortrack tape has abrasions, L upper chest abrasion.  · The following interventions in place  q2hr turns, q shift skin check, roopa cream added to inner buttocks and posterior thighs, q2 hr device repositioning, padding with pillow case around bms, q2hr ETT repositioning, pillows used to float extremities, and floated cortrack.

## 2019-03-09 NOTE — RESPIRATORY CARE
Extubation    Cuff leak noted Yes  Stridor present No     FiO2%: (P) 30 % (03/09/19 1400)  O2 (LPM): 3 (03/09/19 1410)  O2 Daily Delivery Respiratory : Silicone Nasal Cannula (03/09/19 1410)  Patient toleration Well  RCP Complete? Yes  Events/Summary/Plan: Pt extubated (03/09/19 1410) no difficulty breathing noted at this time, pt resting comfortably.

## 2019-03-09 NOTE — PROGRESS NOTES
Gastroenterology Progress Note     Author: Bhanu DION Nichols   Date & Time Created: 3/9/2019 2:04 PM    Chief Complaint:  Reason for Consult:      Colonic pseudo obstruction    History of Present Illness:    Patient is a 63 year female with past medical history of hypothyroidism, type 2 diabetes mellitus, hypertension and depression who was admitted on 02/28 for sepsis thought to be secondary to UTI. Patient is currently intubated, history obtained from chart review and family at bedside.  Patient had presented with symptoms of  abdominal pain, nausea/vomiting and syncopal episodes. She had also reported constipation. CT scan in the ER on 02/28 had showed significant constipation without evidence of obstruction. Repeat CT scan with gastrografin later that day showed no evidence of bowel perforation. She had worsening lactic acidosis initially which has now resolved.   She appears to have had some improvement initially before developing worsening abdominal pain and distension again 2 days back. CT scan   03/06 shows dilated small and large bowel with cecum measuring 9.2 cm. She has an NG tube to suction , last bowel movement this morning.      Interval History:  3/7/2019: rectal tube placed. Neostigmine given x 1 dose with some effect  3/8/2019: no significant issues overnight. Off sedation. No significant BM overnight per RN. Still intubated.  3/9/2019: received another dose of Neostigmine x 1 with BM. Still have abdominal distention but softer         Review of Systems:  Review of Systems   Unable to perform ROS: Intubated       Physical Exam:  Physical Exam   Constitutional: She appears well-developed and well-nourished.   HENT:   Head: Normocephalic and atraumatic.   Eyes: Pupils are equal, round, and reactive to light. Conjunctivae and EOM are normal. Right eye exhibits no discharge. Left eye exhibits no discharge. No scleral icterus.   Neck: Normal range of motion. Neck supple. No JVD present. No tracheal deviation  present. No thyromegaly present.   Cardiovascular: Normal rate and regular rhythm.  Exam reveals no gallop and no friction rub.    No murmur heard.  Pulmonary/Chest: Effort normal and breath sounds normal. No stridor. No respiratory distress. She has no wheezes. She has no rales. She exhibits no tenderness.   Abdominal: Soft. She exhibits no distension and no mass. There is no tenderness. There is no rebound and no guarding.   Hypoactive bowel sound but audible, still distended   Genitourinary: Rectal exam shows guaiac negative stool.   Musculoskeletal: She exhibits no edema, tenderness or deformity.   Lymphadenopathy:     She has no cervical adenopathy.   Neurological: She is alert. No cranial nerve deficit.   Skin: Skin is warm and dry. No rash noted. No erythema. No pallor.       Labs:  Recent Labs      03/07/19   0449  03/08/19   0440  03/09/19   0452   ISTATAPH  7.405  7.406  7.397*   ISTATAPCO2  38.8*  36.2  39.4*   ISTATAPO2  92*  65  107*   ISTATATCO2  26  24  25   [id number]  97  93  98   ISTATARTHCO3  24.3  22.7  24.2   ISTATARTBE  0  -2  -1   ISTATTEMP  100.6 F  101.1 F  96.2 F   ISTATFIO2  40  30  30   ISTATSPEC  Arterial  Arterial  Arterial   ISTATAPHTC  7.389*  7.386*  7.416   PCPUNZMM2UP  99*  71  99*         Recent Labs      03/07/19   0500  03/08/19   0310  03/09/19   0515   SODIUM  144  142  143   POTASSIUM  3.6  3.9  3.8   CHLORIDE  109  112  110   CO2  27  27  25   BUN  18  18  15   CREATININE  1.38  1.21  0.99   MAGNESIUM  2.0   --    --    PHOSPHORUS   --   1.9*  3.4   CALCIUM  7.7*  6.8*  6.8*     Recent Labs      03/07/19   0500  03/08/19   0310  03/09/19   0515   GLUCOSE  160*  157*  129*     Recent Labs      03/07/19   0500  03/08/19   0310  03/09/19   0515   RBC  3.25*  3.09*  3.17*   HEMOGLOBIN  9.4*  8.9*  8.9*   HEMATOCRIT  29.9*  29.0*  29.1*   PLATELETCT  311  374  382     Recent Labs      03/07/19   0500  03/08/19   0310  03/09/19   0515   WBC  10.3  9.9  6.9   NEUTSPOLYS  51.70   48.70  62.60   LYMPHOCYTES  20.70*  30.10  27.80   MONOCYTES  6.90  2.70  1.70   EOSINOPHILS  2.60  0.90  0.90   BASOPHILS  2.60*  2.60*  0.00     Hemodynamics:  Temp (24hrs), Av.8 °C (98.3 °F), Min:36.1 °C (97 °F), Max:38.1 °C (100.6 °F)  Temperature: 36.2 °C (97.2 °F)  Pulse  Av.5  Min: 25  Max: 151Heart Rate (Monitored): (P) 81  NIBP: (P) 140/65  CVP (mm Hg): (!) 7 MM HG  Respiratory:  Rivas Vent Mode: Spont, Rate (breaths/min): 22, PEEP/CPAP: 8, FiO2: 5, P Peak (PIP): 16, P MEAN: 9 Respiration: (!) (P) 24, Pulse Oximetry: 98 %     Work Of Breathing / Effort: Vented  RUL Breath Sounds: Clear, RML Breath Sounds: Diminished, RLL Breath Sounds: Diminished, CHELITA Breath Sounds: Clear After Suction, LLL Breath Sounds: Diminished  Fluids:    Intake/Output Summary (Last 24 hours) at 19 1501  Last data filed at 19 0800   Gross per 24 hour   Intake          2934.07 ml   Output             1000 ml   Net          1934.07 ml     Weight: 122.7 kg (270 lb 8.1 oz)  GI/Nutrition:  Orders Placed This Encounter   Procedures   • Diet NPO     Standing Status:   Standing     Number of Occurrences:   1     Order Specific Question:   Type:     Answer:   Now [1]     Order Specific Question:   Restrict to:     Answer:   Strict [1]     Medical Decision Making, by Problem:  Active Hospital Problems    Diagnosis   • *Septic shock (HCC) [A41.9, R65.21]   • Acute hypoxemic respiratory failure (HCC) [J96.01]   • Atrial fibrillation and flutter (HCC) [I48.91, I48.92]   • Urinary tract infection [N39.0]   • Abdominal distention [R14.0]   • Atelectasis [J98.11]   • Acute encephalopathy [G93.40]   • Depression [F32.9]   • DM (diabetes mellitus) (HCC) [E11.9]   • JOSE (acute kidney injury) (HCC) [N17.9]   • Hypomagnesemia [E83.42]   • Hypocalcemia [E83.51]   • Hypokalemia [E87.6]   • Class 2 severe obesity due to excess calories with serious comorbidity and body mass index (BMI) of 37.0 to 37.9 in adult ([facility]) [E66.01, Z68.37]    • Hypotension [I95.9]   • Abnormal LFTs [R94.5]   • Lactic acidosis [E87.2]       Impressions:  1. Acute colonic pseudo obstruction. Patient is NPO with NG tube to suction; no rectal tube though. S/p Neostigimine 2mg on 3/7/2019 with passage of 450cc of stool; no no stool output since. Still distended. Due to habitus, difficult with visualization of the bowel. Repeated another 2mg on 3/8/2019 with good effect.  2. Sepsis secondary to UTI. On pressors, vanc and zosyn  3. Atrial fibrillation, was previously rate controlled- on Amiodarone  4. Respiratory failure, intubated and mechanically ventilated  5. Diabetes mellitus  6. Hypothyroidism.      Plan:  1. Extubation plan as per ICU team, possibly decrease anxiety, sympathetic overdrive and improve BM  2. KUB still suggests ileus but passing flatus and stool; recommend team to correct electrolyte, frequent turns, rectal tube, minimize pain and sedative  3. If persistent symptoms consider relistor  4. Trickle tube feed and reassess distention.  5. Accurate I/O    Quality-Core Measures

## 2019-03-10 LAB
ANION GAP SERPL CALC-SCNC: 7 MMOL/L (ref 0–11.9)
ANISOCYTOSIS BLD QL SMEAR: ABNORMAL
BASOPHILS # BLD AUTO: 0 % (ref 0–1.8)
BASOPHILS # BLD: 0 K/UL (ref 0–0.12)
BUN SERPL-MCNC: 15 MG/DL (ref 8–22)
CALCIUM SERPL-MCNC: 7.1 MG/DL (ref 8.5–10.5)
CHLORIDE SERPL-SCNC: 111 MMOL/L (ref 96–112)
CO2 SERPL-SCNC: 24 MMOL/L (ref 20–33)
CREAT SERPL-MCNC: 0.93 MG/DL (ref 0.5–1.4)
EOSINOPHIL # BLD AUTO: 0.12 K/UL (ref 0–0.51)
EOSINOPHIL NFR BLD: 1.8 % (ref 0–6.9)
ERYTHROCYTE [DISTWIDTH] IN BLOOD BY AUTOMATED COUNT: 51.7 FL (ref 35.9–50)
GLUCOSE SERPL-MCNC: 117 MG/DL (ref 65–99)
HCT VFR BLD AUTO: 25.3 % (ref 37–47)
HGB BLD-MCNC: 7.9 G/DL (ref 12–16)
LYMPHOCYTES # BLD AUTO: 1.14 K/UL (ref 1–4.8)
LYMPHOCYTES NFR BLD: 17.5 % (ref 22–41)
MAGNESIUM SERPL-MCNC: 1.6 MG/DL (ref 1.5–2.5)
MANUAL DIFF BLD: ABNORMAL
MCH RBC QN AUTO: 28.9 PG (ref 27–33)
MCHC RBC AUTO-ENTMCNC: 31.2 G/DL (ref 33.6–35)
MCV RBC AUTO: 92.7 FL (ref 81.4–97.8)
MICROCYTES BLD QL SMEAR: ABNORMAL
MONOCYTES # BLD AUTO: 0.29 K/UL (ref 0–0.85)
MONOCYTES NFR BLD AUTO: 4.4 % (ref 0–13.4)
MORPHOLOGY BLD-IMP: NORMAL
NEUTROPHILS # BLD AUTO: 4.96 K/UL (ref 2–7.15)
NEUTROPHILS NFR BLD: 64 % (ref 44–72)
NEUTS BAND NFR BLD MANUAL: 12.3 % (ref 0–10)
NRBC # BLD AUTO: 0 K/UL
NRBC BLD-RTO: 0 /100 WBC
PHOSPHATE SERPL-MCNC: 3.3 MG/DL (ref 2.5–4.5)
PLATELET # BLD AUTO: 405 K/UL (ref 164–446)
PLATELET BLD QL SMEAR: NORMAL
PMV BLD AUTO: 10 FL (ref 9–12.9)
POTASSIUM SERPL-SCNC: 3.6 MMOL/L (ref 3.6–5.5)
RBC # BLD AUTO: 2.73 M/UL (ref 4.2–5.4)
RBC BLD AUTO: PRESENT
SODIUM SERPL-SCNC: 142 MMOL/L (ref 135–145)
WBC # BLD AUTO: 6.5 K/UL (ref 4.8–10.8)

## 2019-03-10 PROCEDURE — 85027 COMPLETE CBC AUTOMATED: CPT

## 2019-03-10 PROCEDURE — 83735 ASSAY OF MAGNESIUM: CPT

## 2019-03-10 PROCEDURE — 92610 EVALUATE SWALLOWING FUNCTION: CPT

## 2019-03-10 PROCEDURE — A9270 NON-COVERED ITEM OR SERVICE: HCPCS | Performed by: INTERNAL MEDICINE

## 2019-03-10 PROCEDURE — 80048 BASIC METABOLIC PNL TOTAL CA: CPT

## 2019-03-10 PROCEDURE — 99232 SBSQ HOSP IP/OBS MODERATE 35: CPT | Performed by: HOSPITALIST

## 2019-03-10 PROCEDURE — A9270 NON-COVERED ITEM OR SERVICE: HCPCS | Performed by: HOSPITALIST

## 2019-03-10 PROCEDURE — 99233 SBSQ HOSP IP/OBS HIGH 50: CPT | Performed by: INTERNAL MEDICINE

## 2019-03-10 PROCEDURE — 700102 HCHG RX REV CODE 250 W/ 637 OVERRIDE(OP): Performed by: HOSPITALIST

## 2019-03-10 PROCEDURE — 84100 ASSAY OF PHOSPHORUS: CPT

## 2019-03-10 PROCEDURE — 700102 HCHG RX REV CODE 250 W/ 637 OVERRIDE(OP): Performed by: INTERNAL MEDICINE

## 2019-03-10 PROCEDURE — 770020 HCHG ROOM/CARE - TELE (206)

## 2019-03-10 PROCEDURE — C9113 INJ PANTOPRAZOLE SODIUM, VIA: HCPCS | Performed by: INTERNAL MEDICINE

## 2019-03-10 PROCEDURE — 700105 HCHG RX REV CODE 258: Performed by: INTERNAL MEDICINE

## 2019-03-10 PROCEDURE — 85007 BL SMEAR W/DIFF WBC COUNT: CPT

## 2019-03-10 PROCEDURE — 700111 HCHG RX REV CODE 636 W/ 250 OVERRIDE (IP): Performed by: INTERNAL MEDICINE

## 2019-03-10 RX ORDER — POTASSIUM CHLORIDE 14.9 MG/ML
20 INJECTION INTRAVENOUS ONCE
Status: COMPLETED | OUTPATIENT
Start: 2019-03-10 | End: 2019-03-10

## 2019-03-10 RX ORDER — MAGNESIUM SULFATE HEPTAHYDRATE 40 MG/ML
2 INJECTION, SOLUTION INTRAVENOUS ONCE
Status: COMPLETED | OUTPATIENT
Start: 2019-03-10 | End: 2019-03-10

## 2019-03-10 RX ORDER — POLYETHYLENE GLYCOL 3350 17 G/17G
1 POWDER, FOR SOLUTION ORAL DAILY
Status: DISCONTINUED | OUTPATIENT
Start: 2019-03-10 | End: 2019-03-15 | Stop reason: HOSPADM

## 2019-03-10 RX ADMIN — GABAPENTIN 300 MG: 300 CAPSULE ORAL at 05:02

## 2019-03-10 RX ADMIN — ENOXAPARIN SODIUM 40 MG: 100 INJECTION SUBCUTANEOUS at 05:02

## 2019-03-10 RX ADMIN — PANTOPRAZOLE SODIUM 40 MG: 40 INJECTION, POWDER, LYOPHILIZED, FOR SOLUTION INTRAVENOUS at 05:03

## 2019-03-10 RX ADMIN — GABAPENTIN 300 MG: 300 CAPSULE ORAL at 17:42

## 2019-03-10 RX ADMIN — POLYETHYLENE GLYCOL 3350 1 PACKET: 17 POWDER, FOR SOLUTION ORAL at 12:16

## 2019-03-10 RX ADMIN — MAGNESIUM SULFATE IN WATER 2 G: 40 INJECTION, SOLUTION INTRAVENOUS at 07:39

## 2019-03-10 RX ADMIN — METOPROLOL TARTRATE 25 MG: 25 TABLET, FILM COATED ORAL at 05:02

## 2019-03-10 RX ADMIN — FENTANYL CITRATE 50 MCG: 50 INJECTION, SOLUTION INTRAMUSCULAR; INTRAVENOUS at 07:59

## 2019-03-10 RX ADMIN — POTASSIUM CHLORIDE 20 MEQ: 14.9 INJECTION, SOLUTION INTRAVENOUS at 07:39

## 2019-03-10 RX ADMIN — METOPROLOL TARTRATE 25 MG: 25 TABLET, FILM COATED ORAL at 17:42

## 2019-03-10 RX ADMIN — GABAPENTIN 300 MG: 300 CAPSULE ORAL at 12:16

## 2019-03-10 RX ADMIN — LEVOTHYROXINE SODIUM 150 MCG: 75 TABLET ORAL at 05:02

## 2019-03-10 RX ADMIN — AMIODARONE HYDROCHLORIDE 0.5 MG/MIN: 50 INJECTION, SOLUTION INTRAVENOUS at 05:03

## 2019-03-10 RX ADMIN — ATORVASTATIN CALCIUM 40 MG: 40 TABLET, FILM COATED ORAL at 17:42

## 2019-03-10 ASSESSMENT — ENCOUNTER SYMPTOMS
ABDOMINAL PAIN: 0
CLAUDICATION: 0
DIAPHORESIS: 0
STRIDOR: 0
NAUSEA: 0
CHILLS: 0
CARDIOVASCULAR NEGATIVE: 1
FOCAL WEAKNESS: 0
MYALGIAS: 0
COUGH: 1
DOUBLE VISION: 0
NECK PAIN: 0
SHORTNESS OF BREATH: 0
BLURRED VISION: 0
MYALGIAS: 1
EYES NEGATIVE: 1
SPEECH CHANGE: 0
CONSTITUTIONAL NEGATIVE: 1
HEADACHES: 0
BRUISES/BLEEDS EASILY: 0
NEUROLOGICAL NEGATIVE: 1
VOMITING: 0
CONSTIPATION: 0
PHOTOPHOBIA: 0
PALPITATIONS: 0
NERVOUS/ANXIOUS: 0
BACK PAIN: 1
GASTROINTESTINAL NEGATIVE: 1
SEIZURES: 0
FEVER: 0
HEMOPTYSIS: 0

## 2019-03-10 ASSESSMENT — LIFESTYLE VARIABLES: SUBSTANCE_ABUSE: 0

## 2019-03-10 ASSESSMENT — PATIENT HEALTH QUESTIONNAIRE - PHQ9
2. FEELING DOWN, DEPRESSED, IRRITABLE, OR HOPELESS: NOT AT ALL
1. LITTLE INTEREST OR PLEASURE IN DOING THINGS: NOT AT ALL
SUM OF ALL RESPONSES TO PHQ9 QUESTIONS 1 AND 2: 0

## 2019-03-10 NOTE — THERAPY
"  Speech Language Therapy Clinical Swallow Evaluation completed.  Functional Status: Patient was seen for a clinical bedside swallow evaluation. Patient eager and alert. Patient with softer vocal quality, no complaints of odynophagia or odynophonia. Patient cleared for clear liquids only per GI at this time. Patient was given PO trials of clears (ice, thins, nectars). Patient tolerated single and serial sips via tsp and side of cup with no clinical s/sx of difficulties. Clear vocal quality throughout. At this time recommend a clear liquid diet with assist by staff for set up, upright in chair for all meals as tolerated. Discussed with RN. SLP to follow for further assessment/PO trials once cleared for solids by GI.   Recommendations - Diet: Diet / Liquid Recommendation: Other (Comments) (clear liquid diet)                          Strategies: Monitor during meals, Assistance needed for meal tray set-up, No Straws and Head of Bed at 90 Degrees                          Medication Administration: Medication Administration : Other (See Comments) (as tolerated )  Plan of Care: Will benefit from Speech Therapy 3 times per week  Post-Acute Therapy: Currently anticipate no further skilled therapy needs once patient is discharged from the inpatient setting.    See \"Rehab Therapy-Acute\" Patient Summary Report for complete documentation.   "

## 2019-03-10 NOTE — PROGRESS NOTES
[facility] Medicine Daily Progress Note    Date of Service  3/10/2019    Chief Complaint  63 y.o. female admitted 2/28/2019 with ureosepsis    Hospital Course    This is a 63 year female who was admitted on 02/28 for sepsis thought to be secondary to UTI. She developed respiratory failure and was intubated.  She had presented with symptoms of  abdominal pain, nausea/vomiting and syncopal episodes, as well as constipation. CT scan in the ER on 02/28 had showed significant constipation without evidence of obstruction.       Interval Problem Update  Extubated yesterday; no procedures for GI done  Nothing out of BMS   Neuro intact  Swallow eval ordered  Got 50 fentanyl this am  SR, amio gtt 0.5  Afeb  Hyopactive bs  [place] low side UOP  Central line  LR 83/hr  Get rid of FSBS? No coverage needed  3L NC  1000 IS  Protnix pushes, takes oral ppi at home  S/p 7 days c3 for e coli, then had 5 days of zosyn until yesterday  K and Mag given   Mobilize?  PT/OT will order  Will try off amio    Consultants/Specialty  GI  Intensivist    Code Status  FULL    Disposition  To tele floor    D/W tx team on rounds      Review of Systems  Review of Systems   Constitutional: Negative for chills and fever.   Eyes: Negative for blurred vision.   Respiratory: Negative for cough and shortness of breath.    Cardiovascular: Negative for chest pain and palpitations.   Gastrointestinal: Negative for abdominal pain, nausea and vomiting.   Genitourinary: Negative for dysuria.   Musculoskeletal: Negative for myalgias.   Neurological: Negative for dizziness and headaches.   All other systems reviewed and are negative.       Physical Exam  Temp:  [36.1 °C (97 °F)-37.2 °C (99 °F)] 37.2 °C (99 °F)  Pulse:  [68-80] 72  Resp:  [6-29] 12  BP: (112)/(58) 112/58  SpO2:  [88 %-100 %] 98 %    Physical Exam    Fluids    Intake/Output Summary (Last 24 hours) at 03/10/19 0840  Last data filed at 03/10/19 0800   Gross per 24 hour   Intake          2593.75 ml   Output              1000 ml   Net          1593.75 ml       Laboratory  Recent Labs      03/08/19   0310  03/09/19   0515  03/10/19   0410   WBC  9.9  6.9  6.5   RBC  3.09*  3.17*  2.73*   HEMOGLOBIN  8.9*  8.9*  7.9*   HEMATOCRIT  29.0*  29.1*  25.3*   MCV  93.9  91.8  92.7   MCH  28.8  28.1  28.9   MCHC  30.7*  30.6*  31.2*   RDW  52.4*  51.6*  51.7*   PLATELETCT  374  382  405   MPV  10.1  9.7  10.0     Recent Labs      03/08/19 0310  03/09/19   0515  03/10/19   0410   SODIUM  142  143  142   POTASSIUM  3.9  3.8  3.6   CHLORIDE  112  110  111   CO2  27  25  24   GLUCOSE  157*  129*  117*   BUN  18  15  15   CREATININE  1.21  0.99  0.93   CALCIUM  6.8*  6.8*  7.1*                   Imaging  [id number] VIEW   Final Result         1.  Air-filled distended small bowel and colon, appearance suggests ileus or enteritis. Radiographic follow-up to resolution to exclude progression to obstructive change.   2.  Dobbhoff tube with tip terminating overlying the expected location of the third or fourth duodenal segment.   3.  Nasogastric tube tip terminates overlying the expected location of the pylorus or first duodenal segment.      DX-CHEST-PORTABLE (1 VIEW)   Final Result         1.  Pulmonary edema and/or infiltrates are identified, which are stable since the prior exam.   2.  Atherosclerosis                  [id number] VIEW   Final Result      Moderate air distended bowel loops within the right-sided the abdomen similar to previous findings.      DX-CHEST-PORTABLE (1 VIEW)   Final Result         1.  Pulmonary edema and/or infiltrates are identified, which are stable since the prior exam.   2.  Atherosclerosis               [id number] VIEW   Final Result      Posterior numbering multiple mildly dilated gas-filled loops of bowel in the inferior and mid abdomen. Nonspecific findings which could be seen in the setting of partial small bowel obstruction or ileus.      DX-CHEST-PORTABLE (1 VIEW)   Final Result         1.   Pulmonary edema and/or infiltrates are identified, which are stable since the prior exam.   2.  Atherosclerosis            CT-ABDOMEN-PELVIS WITH   Final Result      1.  Increased pulmonary opacification in the lung bases and small pleural fluid collections.      2.  Some increase in dilatation of the large bowel including the right colon and cecum compared to the prior exam. Findings could be due to ileus. Partial obstruction is possible but no obstructing lesion is appreciated.      3.  Mildly dilated small bowel loops again noted.      4.  Edema in subcutaneous fat is also somewhat increased compared to the prior exam.         DX-CHEST-PORTABLE (1 VIEW)   Final Result         1.  Pulmonary edema and/or infiltrates are identified, which are somewhat decreased since the prior exam.   2.  Atherosclerosis         DX-ABDOMEN FOR TUBE PLACEMENT   Final Result         1.  Filled distended bowel loops, appearance suggests ileus or evolving obstructive changes.   2.  Nasogastric tube tip terminates overlying the expected location of the gastric body.   3.  Pulmonary edema and/or infiltrates      DX-CHEST-PORTABLE (1 VIEW)   Final Result         1.  Pulmonary edema and/or infiltrates are identified, which appear somewhat increased since the prior exam.      [id number] VIEW   Final Result         1.  Air-filled distended loops of bowel are seen. Appearance raises concern for obstruction, radiographic follow-up to resolution recommended.   2.  Dobbhoff tube tip overlying the expected location of the pylorus or first duodenal segment.   3.  Hazy bilateral lung base infiltrates      DX-CHEST-PORTABLE (1 VIEW)   Final Result         1.  Pulmonary edema and/or infiltrates are identified, which appear somewhat increased since the prior exam.      DX-ABDOMEN FOR TUBE PLACEMENT   Final Result         1.  Air-filled distended loops of bowel are seen with reactive mucosal pattern, appearance suggests ileus or enteritis. Recommend  radiographic followup to resolution to exclude progression to obstruction.   2.  Dobbhoff tube tip overlying the expected location of the pylorus or first duodenal segment.   3.  Hazy bilateral lung base opacities suggests infiltrates      MR-BRAIN-W/O   Final Result      1.  No acute abnormality.   2.  The flow voids of the cerebral vessels particularly left M1 segment is patent.The hyperdensity on the recent CT scan likely represent artifact.   3.  Mild cerebral atrophy.      EC-ECHOCARDIOGRAM COMPLETE W/ CONT   Final Result      CT-HEAD W/O   Final Result   Addendum 1 of 1   Call report initiated at 1:40 PM on 3/1/2019.      These findings were discussed with LARYR WISEMAN on 3/1/2019 2:24 PM.      Final      1.  Subtle increased density of LEFT middle cerebral artery and sylvian branches possibly indicating slow flow or occlusion and potential early sign of acute stroke.   2.  No intracranial hemorrhage.   3.  Mild atrophy.      CT-ABDOMEN-PELVIS W/O   Final Result      Decrease in volume of colonic stool with no abnormal bowel dilatation detected      New third spacing with some new mild abdominopelvic ascites. No free air is seen to indicate extravasation. No loculated fluid collection to suggest abscess is noted      No pneumatosis detected to suggest bowel ischemia         DX-ABDOMEN FOR TUBE PLACEMENT   Final Result      Enteric tube terminates over the gastroduodenal junction.      DX-CHEST-LIMITED (1 VIEW)   Final Result      New right IJ line tip overlies the SVC      No radiographic evidence of acute cardiopulmonary process.      DX-CHEST-PORTABLE (1 VIEW)   Final Result      No acute cardiopulmonary abnormality.      CT-CTA COMPLETE THORACOABDOMINAL AORTA   Final Result      1. No evidence of aortic dissection or aneurysm. No high-grade stenosis or occlusion of major pelvic branches vessels.   2. No pulmonary embolus.   3. Moderate to large amount of stool throughout the colon.                  Assessment/Plan  * Septic shock ([facility])- (present on admission)   Assessment & Plan    This was severe sepsis with the following associated acute organ dysfunction(s): acute kidney failure.   Urinary tract infection source  S/p abx     Acute hypoxemic respiratory failure ([facility])- (present on admission)   Assessment & Plan    S/P extubation 3/9 and doing quite well  Intensivist following     Atrial fibrillation and flutter ([facility])   Assessment & Plan    On metoprolol  We will stop amio drip, afib was likely 2/2 sepsis     Abdominal distention- (present on admission)   Assessment & Plan    Resolving; GI following, no endo intervention needed  BMS in place  S/p neostigmine with good result  May need relistor if still constipated; avoid narcotics       Urinary tract infection- (present on admission)   Assessment & Plan    E. Coli  S/p C3     Acute encephalopathy- (present on admission)   Assessment & Plan    Likely 2/2 sepsis, resolved     JOSE (acute kidney injury) ([facility])- (present on admission)   Assessment & Plan    Resolved     DM (diabetes mellitus) ([facility])- (present on admission)   Assessment & Plan    Hold oral hypoglycemics while inpatient  No coverage needed     Depression- (present on admission)   Assessment & Plan    Restart home Elavil      Hypomagnesemia   Assessment & Plan    Replaced     Hypokalemia- (present on admission)   Assessment & Plan    Replaced     Hypocalcemia- (present on admission)   Assessment & Plan    Replacing     Class 2 severe obesity due to excess calories with serious comorbidity and body mass index (BMI) of 37.0 to 37.9 in adult ([facility])- (present on admission)   Assessment & Plan    Body mass index is 39.52 kg/m².          VTE prophylaxis: sq lovenox

## 2019-03-10 NOTE — PROGRESS NOTES
· 2 RN skin check complete with CHARLI Young II.  · Devices in place scds, ekg leads and electrodes, bp cuff, o2 sensor, central line, and bms.  · Skin assessed under devices.  · No confirmed pressure ulcers found.  · Moisture fissure, blisters noted to bilateral posterior thighs and inner butt crack, heels boggy, under cortrack tape has abrasions, L upper chest abrasion, excoriation to natanael area.  · The following interventions in place  q2hr turns, q shift skin check, roopa cream added to inner buttocks and posterior thighs, q2 hr device repositioning, padding with pillow case around bms, pillows used to float extremities.

## 2019-03-10 NOTE — PROGRESS NOTES
Gastroenterology Progress Note     Author: Bhanu Nichols   Date & Time Created: 3/10/2019 9:14 AM    Chief Complaint:  Reason for Consult:      Colonic pseudo obstruction    History of Present Illness:    Patient is a 63 year female with past medical history of hypothyroidism, type 2 diabetes mellitus, hypertension and depression who was admitted on 02/28 for sepsis thought to be secondary to UTI. Patient is currently intubated, history obtained from chart review and family at bedside.  Patient had presented with symptoms of  abdominal pain, nausea/vomiting and syncopal episodes. She had also reported constipation. CT scan in the ER on 02/28 had showed significant constipation without evidence of obstruction. Repeat CT scan with gastrografin later that day showed no evidence of bowel perforation. She had worsening lactic acidosis initially which has now resolved.   She appears to have had some improvement initially before developing worsening abdominal pain and distension again 2 days back. CT scan   03/06 shows dilated small and large bowel with cecum measuring 9.2 cm. She has an NG tube to suction , last bowel movement this morning.      Interval History:  3/7/2019: rectal tube placed. Neostigmine given x 1 dose with some effect  3/8/2019: no significant issues overnight. Off sedation. No significant BM overnight per RN. Still intubated.  3/9/2019: received another dose of Neostigmine x 1 with BM. Still have abdominal distention but softer  3/10/2019: extubated doing better. Burping. No significant abdominal pain. Some stool yesterday but not today. Still not out of bed         Review of Systems:  Review of Systems   Unable to perform ROS: Intubated   Constitutional: Negative.    HENT: Negative.    Eyes: Negative.    Cardiovascular: Negative.    Gastrointestinal: Negative.    Genitourinary: Negative.    Musculoskeletal: Positive for back pain, joint pain and myalgias.   Skin: Negative.    Neurological: Negative.     Endo/Heme/Allergies: Negative.        Physical Exam:  Physical Exam   Constitutional: She appears well-developed and well-nourished.   HENT:   Head: Normocephalic and atraumatic.   Eyes: Pupils are equal, round, and reactive to light. Conjunctivae and EOM are normal. Right eye exhibits no discharge. Left eye exhibits no discharge. No scleral icterus.   Neck: Normal range of motion. Neck supple. No JVD present. No tracheal deviation present. No thyromegaly present.   Cardiovascular: Normal rate and regular rhythm.  Exam reveals no gallop and no friction rub.    No murmur heard.  Pulmonary/Chest: Effort normal and breath sounds normal. No stridor. No respiratory distress. She has no wheezes. She has no rales. She exhibits no tenderness.   Abdominal: Soft. She exhibits no distension and no mass. There is no tenderness. There is no rebound and no guarding.   Hypoactive bowel sound but audible, still distended but softer   Genitourinary: Rectal exam shows guaiac negative stool.   Musculoskeletal: She exhibits no edema, tenderness or deformity.   Lymphadenopathy:     She has no cervical adenopathy.   Neurological: She is alert. No cranial nerve deficit.   Skin: Skin is warm and dry. No rash noted. No erythema. No pallor.       Labs:  Recent Labs      03/08/19   0440  03/09/19   0452   ISTATAPH  7.406  7.397*   ISTATAPCO2  36.2  39.4*   ISTATAPO2  65  107*   ISTATATCO2  24  25   [id number]  93  98   ISTATARTHCO3  22.7  24.2   ISTATARTBE  -2  -1   ISTATTEMP  101.1 F  96.2 F   ISTATFIO2  30  30   ISTATSPEC  Arterial  Arterial   ISTATAPHTC  7.386*  7.416   [id number]  71  99*         Recent Labs      03/08/19   0310  03/09/19   0515  03/10/19   0410   SODIUM  142  143  142   POTASSIUM  3.9  3.8  3.6   CHLORIDE  112  110  111   CO2  27  25  24   BUN  18  15  15   CREATININE  1.21  0.99  0.93   MAGNESIUM   --    --   1.6   PHOSPHORUS  1.9*  3.4  3.3   CALCIUM  6.8*  6.8*  7.1*     Recent Labs      03/08/19   0310   19   0515  03/10/19   0410   GLUCOSE  157*  129*  117*     Recent Labs      19   0310  19   0515  03/10/19   0410   RBC  3.09*  3.17*  2.73*   HEMOGLOBIN  8.9*  8.9*  7.9*   HEMATOCRIT  29.0*  29.1*  25.3*   PLATELETCT  374  382  405     Recent Labs      19   0310  19   0515  03/10/19   0410   WBC  9.9  6.9  6.5   NEUTSPOLYS  48.70  62.60  64.00   LYMPHOCYTES  30.10  27.80  17.50*   MONOCYTES  2.70  1.70  4.40   EOSINOPHILS  0.90  0.90  1.80   BASOPHILS  2.60*  0.00  0.00     Hemodynamics:  Temp (24hrs), Av.7 °C (98 °F), Min:36.1 °C (97 °F), Max:37.2 °C (99 °F)  Temperature: 37.2 °C (99 °F)  Pulse  Av.6  Min: 25  Max: 151Heart Rate (Monitored): 72  Blood Pressure: 112/58, NIBP: 126/52    Respiratory:  Rivas Vent Mode: Spont, PEEP/CPAP: 8, FiO2: 5, P Peak (PIP): 16, P MEAN: 9 Respiration: 12, Pulse Oximetry: 98 %, O2 Daily Delivery Respiratory : Silicone Nasal Cannula     Work Of Breathing / Effort: Mild  RUL Breath Sounds: Clear, RML Breath Sounds: Diminished, RLL Breath Sounds: Diminished, CHELITA Breath Sounds: Clear, LLL Breath Sounds: Diminished  Fluids:    Intake/Output Summary (Last 24 hours) at 19 1501  Last data filed at 19 0800   Gross per 24 hour   Intake          2934.07 ml   Output             1000 ml   Net          1934.07 ml     Weight: 124.8 kg (275 lb 2.2 oz)  GI/Nutrition:  Orders Placed This Encounter   Procedures   • Diet NPO     Standing Status:   Standing     Number of Occurrences:   1     Order Specific Question:   Type:     Answer:   Now [1]     Order Specific Question:   Restrict to:     Answer:   Sips with Medications [3]     Medical Decision Making, by Problem:  Active Hospital Problems    Diagnosis   • *Septic shock (HCC) [A41.9, R65.21]   • Acute hypoxemic respiratory failure (HCC) [J96.01]   • Atrial fibrillation and flutter (HCC) [I48.91, I48.92]   • Urinary tract infection [N39.0]   • Abdominal distention [R14.0]   • Atelectasis  [J98.11]   • Acute encephalopathy [G93.40]   • Depression [F32.9]   • DM (diabetes mellitus) ([facility]) [E11.9]   • JOSE (acute kidney injury) ([facility]) [N17.9]   • Hypomagnesemia [E83.42]   • Hypocalcemia [E83.51]   • Hypokalemia [E87.6]   • Class 2 severe obesity due to excess calories with serious comorbidity and body mass index (BMI) of 37.0 to 37.9 in adult ([facility]) [E66.01, Z68.37]   • Hypotension [I95.9]   • Abnormal LFTs [R94.5]   • Lactic acidosis [E87.2]       Impressions:  1. Acute colonic pseudo obstruction. Patient is NPO with NG tube to suction; no rectal tube though. S/p Neostigimine 2mg on 3/7/2019 with passage of 450cc of stool; no no stool output since. Still distended. Due to habitus, difficult with visualization of the bowel. Repeated another 2mg on 3/8/2019 with good effect.  2. Sepsis secondary to UTI. On pressors, vanc and zosyn  3. Atrial fibrillation, was previously rate controlled- on Amiodarone  4. Respiratory failure, intubated and mechanically ventilated  5. Diabetes mellitus  6. Hypothyroidism.      Plan:  1. Early mobilization and out of bed  2. Correct electrolytes  3. Minimize narcotics usage  4. Speech evaluation for swallow safety  5. Miralax daily   6. May need relistor if persistent constipation on pain medications.  7. Follow clinically for passage of flatus, stool and abdominal distention.    GI TO SIGN OFF / STAND BY - Thank you very much for allowing me to participate in the care of your patient.  Please feel free to contact me anytime at [phone]    Quality-Core Measures

## 2019-03-10 NOTE — PROGRESS NOTES
Critical Care Progress Note    Date of admission  2/28/2019    Chief Complaint  63 y.o. female admitted 2/28/2019 with abdominal pain.    Hospital Course    This lady was admitted to the ICU with septic shock due to a UTI.      Interval Problem Update  Reviewed last 24 hour events:      Extubated yest  Swallow eval  Replete K and Mg  SR  Amio 0.5  Afebrile  Zosyn stopped yest  LR at 83  3 L NC  IS 1000  Transfer  Stop amio      She is feeling better today.  Her abdominal distention is improved.  She denies abdominal pain.  She is having bowel movements.  She has no nausea or vomiting.  She denies angina, palpitations or syncope.  She has a dry cough.  She denies hemoptysis or dyspnea.      Review of Systems  Review of Systems   Constitutional: Negative for chills, diaphoresis and fever.   HENT: Negative for ear pain and nosebleeds.    Eyes: Negative for blurred vision, double vision and photophobia.   Respiratory: Positive for cough. Negative for hemoptysis, shortness of breath and stridor.    Cardiovascular: Negative for chest pain, palpitations and claudication.   Gastrointestinal: Negative for abdominal pain, constipation, nausea and vomiting.   Genitourinary: Negative for dysuria, hematuria and urgency.   Musculoskeletal: Negative for myalgias and neck pain.   Skin: Negative for rash.   Neurological: Negative for speech change, focal weakness, seizures and headaches.   Endo/Heme/Allergies: Does not bruise/bleed easily.   Psychiatric/Behavioral: Negative for substance abuse and suicidal ideas. The patient is not nervous/anxious.         Vital Signs for last 24 hours   Temp:  [36.1 °C (97 °F)-36.9 °C (98.5 °F)] 36.9 °C (98.5 °F)  Pulse:  [68-91] 74  Resp:  [6-29] 20  SpO2:  [88 %-100 %] 97 %    Hemodynamic parameters for last 24 hours       Respiratory Information for the last 24 hours  Rivas Vent Mode: Spont  PEEP/CPAP: 8  FiO2: 5  P Support: 5  P MEAN: 9  Length of Weaning Trial (Hours): 3    Physical Exam    Physical Exam   Constitutional: She is oriented to person, place, and time. No distress.   HENT:   Head: Normocephalic and atraumatic.   Right Ear: External ear normal.   Left Ear: External ear normal.   Mouth/Throat: Oropharynx is clear and moist.   Eyes: Pupils are equal, round, and reactive to light. Conjunctivae are normal. Right eye exhibits no discharge. Left eye exhibits no discharge.   Neck: Neck supple. No JVD present. No tracheal deviation present.   Cardiovascular: Intact distal pulses.  Exam reveals no gallop.    Sinus rhythm   Pulmonary/Chest: No stridor. No respiratory distress. She has no wheezes. She has rales (Improved crackles at the bases).   Abdominal: Bowel sounds are normal. She exhibits distension (Her distention is improving). There is no tenderness. There is no rebound.   Musculoskeletal: She exhibits no tenderness.   No clubbing or cyanosis   Neurological: She is alert and oriented to person, place, and time. No cranial nerve deficit. Coordination normal.   No focal weakness   Skin: Skin is warm and dry. She is not diaphoretic. No erythema.       Medications  Current Facility-Administered Medications   Medication Dose Route Frequency Provider Last Rate Last Dose   • potassium bicarbonate (KLYTE) effervescent tablet 25 mEq  25 mEq Enteral Tube Once Fabricio Ivey M.D.   Stopped at 03/09/19 1800   • zolpidem (AMBIEN) tablet 5 mg  5 mg Oral HS PRN Gabriel Kincaid M.D.   5 mg at 03/09/19 2321   • pantoprazole (PROTONIX) injection 40 mg  40 mg Intravenous DAILY Fabricio Ivey M.D.   40 mg at 03/09/19 0514   • phenylephrine (STIVEN-SYNEPHRINE) 40,000 mcg in D5W 250 mL Infusion  0-300 mcg/min Intravenous Continuous Fabricio Ivey M.D.   Stopped at 03/08/19 1222   • ondansetron (ZOFRAN ODT) dispertab 4 mg  4 mg Enteral Tube Q4HRS PRN Fabricio Ivey M.D.       • polyethylene glycol/lytes (MIRALAX) PACKET 1 Packet  1 Packet Enteral Tube QDAY PRN Fabricio Ivey  M.D.   1 Packet at 03/08/19 1803   • promethazine (PHENERGAN) tablet 12.5-25 mg  12.5-25 mg Enteral Tube Q4HRS PRN Fabricio Ivey M.D.       • metoprolol (LOPRESSOR) tablet 25 mg  25 mg Enteral Tube TWICE DAILY Zhou Longo M.D.   Stopped at 03/09/19 1800   • fentaNYL (SUBLIMAZE) injection 25 mcg  25 mcg Intravenous Q HOUR PRN Fabricio Ivey M.D.        Or   • fentaNYL (SUBLIMAZE) injection 50 mcg  50 mcg Intravenous Q HOUR PRN Fabricio Ivey M.D.   50 mcg at 03/07/19 1244    Or   • fentaNYL (SUBLIMAZE) injection 100 mcg  100 mcg Intravenous Q HOUR PRN Fabricio Ivey M.D.   100 mcg at 03/09/19 0935   • Respiratory Care per Protocol   Nebulization Continuous RT Fabricio Ivey M.D.       • MD Alert...ICU Electrolyte Replacement per Pharmacy   Other PHARMACY TO DOSE Fabricio Ivey M.D.       • ipratropium-albuterol (DUONEB) nebulizer solution  3 mL Nebulization Q2HRS PRN (RT) Fabricio Ivey M.D.       • vasopressin (VASOSTRICT) 20 Units in  mL Infusion  0.03 Units/min Intravenous Continuous Fabricio Ivey M.D.   Stopped at 03/05/19 2230   • amiodarone (CORDARONE) 450 mg in D5W 250 mL Infusion  0.5-1 mg/min Intravenous Continuous Fabricio Ivey M.D. 17 mL/hr at 03/09/19 1425 0.5 mg/min at 03/09/19 1425   • gabapentin (NEURONTIN) capsule 300 mg  300 mg Enteral Tube TID Fabricio Shipley, D.O.   Stopped at 03/09/19 1800   • acetaminophen (TYLENOL) tablet 650 mg  650 mg Enteral Tube Q4HRS PRN KWAKU Granger.O.   650 mg at 03/08/19 2250   • atorvastatin (LIPITOR) tablet 40 mg  40 mg Enteral Tube Q EVENING Fabricio Shipley D.O.   Stopped at 03/09/19 1800   • levothyroxine (SYNTHROID) tablet 150 mcg  150 mcg Enteral Tube AM ES Fabricio Shipley D.O.   150 mcg at 03/09/19 0514   • enoxaparin (LOVENOX) inj 40 mg  40 mg Subcutaneous DAILY Fabricio Ivey M.D.   40 mg at 03/09/19 0514   • lactated ringers infusion   Intravenous Continuous Ace HALL  FAWN Patiño 83 mL/hr at 03/09/19 0946     • ondansetron (ZOFRAN) syringe/vial injection 4 mg  4 mg Intravenous Q4HRS PRSUHA Solis M.D.   4 mg at 03/06/19 2248   • promethazine (PHENERGAN) suppository 12.5-25 mg  12.5-25 mg Rectal Q4HRS PRSUHA Solis M.D.       • prochlorperazine (COMPAZINE) injection 5-10 mg  5-10 mg Intravenous Q4HRS PRSUHA Solis M.D.       • insulin regular (HUMULIN R) injection 1-6 Units  1-6 Units Subcutaneous Q6HRS Kareem Solis M.D.   Stopped at 03/08/19 0600    And   • dextrose 50% (D50W) injection 25 mL  25 mL Intravenous Q15 MIN PRSUHA Solis M.D.           Fluids    Intake/Output Summary (Last 24 hours) at 03/10/19 0458  Last data filed at 03/09/19 2200   Gross per 24 hour   Intake          2763.33 ml   Output             1625 ml   Net          1138.33 ml       Laboratory  Recent Labs      03/07/19   0449  03/08/19   0440  03/09/19   0452   ISTATAPH  7.405  7.406  7.397*   ISTATAPCO2  38.8*  36.2  39.4*   ISTATAPO2  92*  65  107*   ISTATATCO2  26  24  25   [id number]  97  93  98   ISTATARTHCO3  24.3  22.7  24.2   ISTATARTBE  0  -2  -1   ISTATTEMP  100.6 F  101.1 F  96.2 F   ISTATFIO2  40  30  30   ISTATSPEC  Arterial  Arterial  Arterial   ISTATAPHTC  7.389*  7.386*  7.416   [id number]  99*  71  99*         Recent Labs      03/07/19   0500  03/08/19   0310  03/09/19   0515   SODIUM  144  142  143   POTASSIUM  3.6  3.9  3.8   CHLORIDE  109  112  110   CO2  27  27  25   BUN  18  18  15   CREATININE  1.38  1.21  0.99   MAGNESIUM  2.0   --    --    PHOSPHORUS   --   1.9*  3.4   CALCIUM  7.7*  6.8*  6.8*     Recent Labs      03/07/19   0500  03/08/19   0310  03/09/19   0515   GLUCOSE  160*  157*  129*     Recent Labs      03/07/19   0500  03/08/19 0310 03/09/19   0515   WBC  10.3  9.9  6.9   NEUTSPOLYS  51.70  48.70  62.60   LYMPHOCYTES  20.70*  30.10  27.80   MONOCYTES  6.90  2.70  1.70   EOSINOPHILS  2.60  0.90  0.90   BASOPHILS  2.60*  2.60*  0.00      Recent Labs      03/07/19   0500  03/08/19   0310  03/09/19   0515   RBC  3.25*  3.09*  3.17*   HEMOGLOBIN  9.4*  8.9*  8.9*   HEMATOCRIT  29.9*  29.0*  29.1*   PLATELETCT  311  374  382       Imaging  X-Ray:  I have personally reviewed the images and compared with prior images. and My impression is: Bibasilar opacities are improved    Assessment/Plan  * Septic shock ([facility])- (present on admission)   Assessment & Plan    Severe sepsis with associated acute encephalopathy and acute kidney injury  The source of sepsis is not entirely clear  Blood cultures are negative  UA is improved  Sputum culture negative  Shock has resolved - no longer on vasopressor support  Sepsis improved  Zosyn stopped yesterday - observe off antibiotics     Acute hypoxemic respiratory failure ([facility])   Assessment & Plan    Intubated on 3/5-3/9  Respiratory failure improved  Continue oxygen     Atrial fibrillation and flutter ([facility])   Assessment & Plan    New onset atrial flutter/fibrillation  She remains in sinus rhythm  Loaded with digoxin on 3/6 - did not require any further digoxin  Optimize magnesium and potassium  Stop amiodarone drip and observe  Echocardiogram unremarkable  TSH normal     Abdominal distention   Assessment & Plan    CT of abdomen/pelvis revealed dilated cecum and right colon  Colonic pseudoobstruction  Neostigmine on 3/7 and again on 3/8  Clinically improved  Increase activity  Avoid narcotics  Continue bowel regimen     Urinary tract infection- (present on admission)   Assessment & Plan    Urine culture with E. coli  Zosyn stopped on 3/9  Observe off antibiotics     Acute encephalopathy   Assessment & Plan    Suspect due to sepsis and metabolic  MRI without evidence of acute stroke  Ammonia level normal  Her encephalopathy appears to have resolved  Limit sedatives     DM (diabetes mellitus) ([facility])- (present on admission)   Assessment & Plan    Siding scale insulin     Hypomagnesemia   Assessment & Plan    Replete  magnesium     Hypokalemia   Assessment & Plan    Replete potassium          VTE:  Lovenox  Ulcer: PPI  Lines: Central Line  Ongoing indication addressed and Crabtree Catheter  Ongoing indication addressed    I have performed a physical exam and reviewed and updated ROS and Plan today (3/10/2019). In review of yesterday's note (3/9/2019), there are no changes except as documented above.    OK to transfer out of ICU.  [facility] will sign off.  Please call if you have any questions.    Discussed patient condition and risk of morbidity and/or mortality with Family, RN, RT, Pharmacy, Charge nurse / hot rounds and QA team     Fabricio Ivey MD  Pulmonary and Critical Care Medicine

## 2019-03-11 ENCOUNTER — APPOINTMENT (OUTPATIENT)
Dept: RADIOLOGY | Facility: MEDICAL CENTER | Age: 64
DRG: 871 | End: 2019-03-11
Attending: HOSPITALIST
Payer: MEDICARE

## 2019-03-11 LAB
ANION GAP SERPL CALC-SCNC: 7 MMOL/L (ref 0–11.9)
BACTERIA BLD CULT: NORMAL
BACTERIA BLD CULT: NORMAL
BUN SERPL-MCNC: 10 MG/DL (ref 8–22)
CALCIUM SERPL-MCNC: 6.9 MG/DL (ref 8.5–10.5)
CHLORIDE SERPL-SCNC: 108 MMOL/L (ref 96–112)
CO2 SERPL-SCNC: 26 MMOL/L (ref 20–33)
CREAT SERPL-MCNC: 0.92 MG/DL (ref 0.5–1.4)
ERYTHROCYTE [DISTWIDTH] IN BLOOD BY AUTOMATED COUNT: 49.2 FL (ref 35.9–50)
GLUCOSE SERPL-MCNC: 119 MG/DL (ref 65–99)
HCT VFR BLD AUTO: 27.6 % (ref 37–47)
HGB BLD-MCNC: 8.6 G/DL (ref 12–16)
MAGNESIUM SERPL-MCNC: 1.7 MG/DL (ref 1.5–2.5)
MCH RBC QN AUTO: 28.1 PG (ref 27–33)
MCHC RBC AUTO-ENTMCNC: 31.2 G/DL (ref 33.6–35)
MCV RBC AUTO: 90.2 FL (ref 81.4–97.8)
PLATELET # BLD AUTO: 466 K/UL (ref 164–446)
PMV BLD AUTO: 9.4 FL (ref 9–12.9)
POTASSIUM SERPL-SCNC: 3.2 MMOL/L (ref 3.6–5.5)
RBC # BLD AUTO: 3.06 M/UL (ref 4.2–5.4)
SIGNIFICANT IND 70042: NORMAL
SIGNIFICANT IND 70042: NORMAL
SITE SITE: NORMAL
SITE SITE: NORMAL
SODIUM SERPL-SCNC: 141 MMOL/L (ref 135–145)
SOURCE SOURCE: NORMAL
SOURCE SOURCE: NORMAL
WBC # BLD AUTO: 6.7 K/UL (ref 4.8–10.8)

## 2019-03-11 PROCEDURE — A9270 NON-COVERED ITEM OR SERVICE: HCPCS | Performed by: HOSPITALIST

## 2019-03-11 PROCEDURE — 700111 HCHG RX REV CODE 636 W/ 250 OVERRIDE (IP): Performed by: INTERNAL MEDICINE

## 2019-03-11 PROCEDURE — 80048 BASIC METABOLIC PNL TOTAL CA: CPT

## 2019-03-11 PROCEDURE — C9113 INJ PANTOPRAZOLE SODIUM, VIA: HCPCS | Performed by: INTERNAL MEDICINE

## 2019-03-11 PROCEDURE — 97165 OT EVAL LOW COMPLEX 30 MIN: CPT

## 2019-03-11 PROCEDURE — 85027 COMPLETE CBC AUTOMATED: CPT

## 2019-03-11 PROCEDURE — 700111 HCHG RX REV CODE 636 W/ 250 OVERRIDE (IP): Performed by: HOSPITALIST

## 2019-03-11 PROCEDURE — 99232 SBSQ HOSP IP/OBS MODERATE 35: CPT | Performed by: HOSPITALIST

## 2019-03-11 PROCEDURE — 83735 ASSAY OF MAGNESIUM: CPT

## 2019-03-11 PROCEDURE — 700102 HCHG RX REV CODE 250 W/ 637 OVERRIDE(OP): Performed by: INTERNAL MEDICINE

## 2019-03-11 PROCEDURE — 74018 RADEX ABDOMEN 1 VIEW: CPT

## 2019-03-11 PROCEDURE — 700102 HCHG RX REV CODE 250 W/ 637 OVERRIDE(OP): Performed by: HOSPITALIST

## 2019-03-11 PROCEDURE — 97162 PT EVAL MOD COMPLEX 30 MIN: CPT

## 2019-03-11 PROCEDURE — A9270 NON-COVERED ITEM OR SERVICE: HCPCS | Performed by: INTERNAL MEDICINE

## 2019-03-11 PROCEDURE — 770020 HCHG ROOM/CARE - TELE (206)

## 2019-03-11 PROCEDURE — 700105 HCHG RX REV CODE 258: Performed by: INTERNAL MEDICINE

## 2019-03-11 RX ORDER — AMITRIPTYLINE HYDROCHLORIDE 50 MG/1
150 TABLET, FILM COATED ORAL
Status: DISCONTINUED | OUTPATIENT
Start: 2019-03-11 | End: 2019-03-15 | Stop reason: HOSPADM

## 2019-03-11 RX ORDER — MAGNESIUM SULFATE HEPTAHYDRATE 40 MG/ML
2 INJECTION, SOLUTION INTRAVENOUS ONCE
Status: COMPLETED | OUTPATIENT
Start: 2019-03-11 | End: 2019-03-11

## 2019-03-11 RX ORDER — POTASSIUM CHLORIDE 20 MEQ/1
40 TABLET, EXTENDED RELEASE ORAL EVERY 6 HOURS
Status: COMPLETED | OUTPATIENT
Start: 2019-03-11 | End: 2019-03-11

## 2019-03-11 RX ORDER — OMEPRAZOLE 20 MG/1
20 CAPSULE, DELAYED RELEASE ORAL DAILY
Status: DISCONTINUED | OUTPATIENT
Start: 2019-03-12 | End: 2019-03-15 | Stop reason: HOSPADM

## 2019-03-11 RX ADMIN — ENOXAPARIN SODIUM 40 MG: 100 INJECTION SUBCUTANEOUS at 05:19

## 2019-03-11 RX ADMIN — POTASSIUM CHLORIDE 40 MEQ: 1500 TABLET, EXTENDED RELEASE ORAL at 14:42

## 2019-03-11 RX ADMIN — GABAPENTIN 300 MG: 300 CAPSULE ORAL at 17:31

## 2019-03-11 RX ADMIN — METOPROLOL TARTRATE 25 MG: 25 TABLET, FILM COATED ORAL at 17:32

## 2019-03-11 RX ADMIN — POTASSIUM CHLORIDE 40 MEQ: 1500 TABLET, EXTENDED RELEASE ORAL at 11:23

## 2019-03-11 RX ADMIN — ACETAMINOPHEN 650 MG: 325 TABLET, FILM COATED ORAL at 19:50

## 2019-03-11 RX ADMIN — CALCIUM GLUCONATE 2 G: 98 INJECTION, SOLUTION INTRAVENOUS at 07:41

## 2019-03-11 RX ADMIN — MAGNESIUM SULFATE IN WATER 2 G: 40 INJECTION, SOLUTION INTRAVENOUS at 11:24

## 2019-03-11 RX ADMIN — POLYETHYLENE GLYCOL 3350 1 PACKET: 17 POWDER, FOR SOLUTION ORAL at 05:20

## 2019-03-11 RX ADMIN — ATORVASTATIN CALCIUM 40 MG: 40 TABLET, FILM COATED ORAL at 17:31

## 2019-03-11 RX ADMIN — AMITRIPTYLINE HYDROCHLORIDE 150 MG: 50 TABLET, FILM COATED ORAL at 19:50

## 2019-03-11 RX ADMIN — METOPROLOL TARTRATE 25 MG: 25 TABLET, FILM COATED ORAL at 05:20

## 2019-03-11 RX ADMIN — GABAPENTIN 300 MG: 300 CAPSULE ORAL at 05:20

## 2019-03-11 RX ADMIN — PANTOPRAZOLE SODIUM 40 MG: 40 INJECTION, POWDER, LYOPHILIZED, FOR SOLUTION INTRAVENOUS at 05:21

## 2019-03-11 RX ADMIN — LEVOTHYROXINE SODIUM 150 MCG: 75 TABLET ORAL at 05:20

## 2019-03-11 RX ADMIN — GABAPENTIN 300 MG: 300 CAPSULE ORAL at 11:25

## 2019-03-11 ASSESSMENT — COGNITIVE AND FUNCTIONAL STATUS - GENERAL
WALKING IN HOSPITAL ROOM: A LOT
HELP NEEDED FOR BATHING: A LOT
SUGGESTED CMS G CODE MODIFIER DAILY ACTIVITY: CK
MOVING TO AND FROM BED TO CHAIR: UNABLE
STANDING UP FROM CHAIR USING ARMS: A LOT
DRESSING REGULAR UPPER BODY CLOTHING: A LITTLE
TOILETING: A LOT
MOBILITY SCORE: 12
TURNING FROM BACK TO SIDE WHILE IN FLAT BAD: A LOT
PERSONAL GROOMING: A LITTLE
SUGGESTED CMS G CODE MODIFIER MOBILITY: CL
MOVING FROM LYING ON BACK TO SITTING ON SIDE OF FLAT BED: A LITTLE
DAILY ACTIVITIY SCORE: 16
CLIMB 3 TO 5 STEPS WITH RAILING: A LOT
DRESSING REGULAR LOWER BODY CLOTHING: A LOT

## 2019-03-11 ASSESSMENT — ENCOUNTER SYMPTOMS
NAUSEA: 0
FEVER: 0
SHORTNESS OF BREATH: 0
WEAKNESS: 1
ABDOMINAL PAIN: 0
CHILLS: 0
DIZZINESS: 0
MYALGIAS: 0
HEADACHES: 0
VOMITING: 0
PALPITATIONS: 0
BLURRED VISION: 0
COUGH: 0

## 2019-03-11 ASSESSMENT — ACTIVITIES OF DAILY LIVING (ADL): TOILETING: INDEPENDENT

## 2019-03-11 ASSESSMENT — GAIT ASSESSMENTS
DISTANCE (FEET): 2
GAIT LEVEL OF ASSIST: CONTACT GUARD ASSIST
DEVIATION: BRADYKINETIC
ASSISTIVE DEVICE: FRONT WHEEL WALKER

## 2019-03-11 NOTE — THERAPY
"Physical Therapy Evaluation completed.   Bed Mobility:  Supine to Sit: Minimal Assist (x2)  Transfers: Sit to Stand: Contact Guard Assist  Gait: Level Of Assist: 2ft Contact Guard Assist with Front-Wheel Walker   Plan of Care: Will benefit from Physical Therapy 3 times per week  Discharge Recommendations: Equipment: Will Continue to Assess for Equipment Needs. Recommend inpatient transitional care services for continued physical therapy services.      See \"Rehab Therapy-Acute\" Patient Summary Report for complete documentation.     Pt is a 62yo female presenting to [facility] secondary to sepsis and UTI, was intubated due to respiratory failure, extubated 3/9. Pt presents to PT with decreased activity tolerance, weakness, and impaired balance. Pt required min A x2 for supine to sit, able to pull on therapist with UEs. Required occasional min A for dynamic sitting balance, pt losing balance posteriorly when attempted to perform MMT on LEs and UEs. Pt able to perform STS from EOB to FWW with CGA. Able to demonstrate lateral weight shifting and marching in place, then side stepping towards HOB. Pt tolerated standing ~ 5 minutes. Pt is highly motivated to participate in therapy, and would be great candidate for post acute intensive therapy prior to return home to ensure safe mobility and return to PLOF.   "

## 2019-03-11 NOTE — PROGRESS NOTES
Pt transferred on cardiac monitoring to [facility] 733 bed 1 via bed accompanied by CNA and RN. Pt in no distress during transfer and was able to stand-pivot to her new bed with assistance. Chart brought with pt. Care relinquished to tele RN.

## 2019-03-11 NOTE — DISCHARGE PLANNING
IP consult for physiatry from Dr. Pimentel.     Debility sepsis respiratory failure. Ongoing medical management as well as therapy need. Anticipate post acute services to facilitate a successful transition to community home tri level 4 steps to enter 12 steps with in the home. Patient lives with sister, PLOF independent. Medicare HMO provice [employer]. RRH not a benefit of the provider.  Based on debility diagnosis and not contracted with insurance and limited Physiatry services. No physiatry consult ordered per protocol. Please consider a referral for post acute services to an in network provider.

## 2019-03-11 NOTE — PROGRESS NOTES
Pt arrived to room via ICU bed. Pt ambulated with hand held assist to the bed. RA, IV SL, tele monitor on-SR 70s. Pt denies any pain at this time. Safety precautions are in place, call bell in reach. Instructed pt to call for assistance if needed.

## 2019-03-11 NOTE — PROGRESS NOTES
[facility] Medicine Daily Progress Note    Date of Service  3/11/2019    Chief Complaint  63 y.o. female admitted 2/28/2019 with ureosepsis    Hospital Course    This is a 63 year female who was admitted on 02/28 for sepsis thought to be secondary to UTI. She developed respiratory failure and was intubated.  She had presented with symptoms of  abdominal pain, nausea/vomiting and syncopal episodes, as well as constipation. CT scan in the ER on 02/28 had showed significant constipation without evidence of obstruction.       Interval Problem Update  Worked with PT, doing well but she will   Rectal tube came out, not much stool  1.5L liquid stool  Flat plate abdomen as patient is still quite distended; passing flatus  Switch to oral omeprazole  Replaced electrolytes  No afib still, off amio    Consultants/Specialty  GI  Intensivist    Code Status  FULL    Disposition  To tele floor, orders in since 3/10    D/W tx team on rounds    Review of Systems  Review of Systems   Constitutional: Negative for chills and fever.   Eyes: Negative for blurred vision.   Respiratory: Negative for cough and shortness of breath.    Cardiovascular: Negative for chest pain and palpitations.   Gastrointestinal: Negative for abdominal pain, nausea and vomiting.   Genitourinary: Negative for dysuria.   Musculoskeletal: Negative for myalgias.   Neurological: Positive for weakness (starting to feel stronger). Negative for dizziness and headaches.   All other systems reviewed and are negative.       Physical Exam  Temp:  [36.4 °C (97.6 °F)-37.2 °C (99 °F)] 36.4 °C (97.6 °F)  Pulse:  [61-74] 68  Resp:  [12-44] 15  SpO2:  [91 %-98 %] 96 %    Physical Exam   Constitutional: She is oriented to person, place, and time.   obese   Cardiovascular: Normal rate, regular rhythm and normal heart sounds.    No murmur heard.  Pulmonary/Chest: Effort normal and breath sounds normal. No respiratory distress. She has no wheezes. She has no rales.   Abdominal: Soft.  She exhibits distension. There is no tenderness.   BS scant but present   Musculoskeletal: Normal range of motion. She exhibits no edema.   Neurological: She is alert and oriented to person, place, and time.   Skin: Skin is warm and dry. No erythema.   Nursing note and vitals reviewed.      Fluids    Intake/Output Summary (Last 24 hours) at 03/11/19 0929  Last data filed at 03/11/19 0500   Gross per 24 hour   Intake              234 ml   Output             1525 ml   Net            -1291 ml       Laboratory  Recent Labs      03/09/19   0515  03/10/19   0410  03/11/19   0410   WBC  6.9  6.5  6.7   RBC  3.17*  2.73*  3.06*   HEMOGLOBIN  8.9*  7.9*  8.6*   HEMATOCRIT  29.1*  25.3*  27.6*   MCV  91.8  92.7  90.2   MCH  28.1  28.9  28.1   MCHC  30.6*  31.2*  31.2*   RDW  51.6*  51.7*  49.2   PLATELETCT  382  405  466*   MPV  9.7  10.0  9.4     Recent Labs      03/09/19   0515  03/10/19   0410  03/11/19   0410   SODIUM  143  142  141   POTASSIUM  3.8  3.6  3.2*   CHLORIDE  110  111  108   CO2  25  24  26   GLUCOSE  129*  117*  119*   BUN  15  15  10   CREATININE  0.99  0.93  0.92   CALCIUM  6.8*  7.1*  6.9*                   Imaging  [id number] VIEW   Final Result         1.  Air-filled distended small bowel and colon, appearance suggests ileus or enteritis. Radiographic follow-up to resolution to exclude progression to obstructive change.   2.  Dobbhoff tube with tip terminating overlying the expected location of the third or fourth duodenal segment.   3.  Nasogastric tube tip terminates overlying the expected location of the pylorus or first duodenal segment.      DX-CHEST-PORTABLE (1 VIEW)   Final Result         1.  Pulmonary edema and/or infiltrates are identified, which are stable since the prior exam.   2.  Atherosclerosis                  [id number]-[id number]-3 VIEW   Final Result      Moderate air distended bowel loops within the right-sided the abdomen similar to previous findings.      DX-CHEST-PORTABLE (1 VIEW)   Final  Result         1.  Pulmonary edema and/or infiltrates are identified, which are stable since the prior exam.   2.  Atherosclerosis               [id number] VIEW   Final Result      Posterior numbering multiple mildly dilated gas-filled loops of bowel in the inferior and mid abdomen. Nonspecific findings which could be seen in the setting of partial small bowel obstruction or ileus.      DX-CHEST-PORTABLE (1 VIEW)   Final Result         1.  Pulmonary edema and/or infiltrates are identified, which are stable since the prior exam.   2.  Atherosclerosis            CT-ABDOMEN-PELVIS WITH   Final Result      1.  Increased pulmonary opacification in the lung bases and small pleural fluid collections.      2.  Some increase in dilatation of the large bowel including the right colon and cecum compared to the prior exam. Findings could be due to ileus. Partial obstruction is possible but no obstructing lesion is appreciated.      3.  Mildly dilated small bowel loops again noted.      4.  Edema in subcutaneous fat is also somewhat increased compared to the prior exam.         DX-CHEST-PORTABLE (1 VIEW)   Final Result         1.  Pulmonary edema and/or infiltrates are identified, which are somewhat decreased since the prior exam.   2.  Atherosclerosis         DX-ABDOMEN FOR TUBE PLACEMENT   Final Result         1.  Filled distended bowel loops, appearance suggests ileus or evolving obstructive changes.   2.  Nasogastric tube tip terminates overlying the expected location of the gastric body.   3.  Pulmonary edema and/or infiltrates      DX-CHEST-PORTABLE (1 VIEW)   Final Result         1.  Pulmonary edema and/or infiltrates are identified, which appear somewhat increased since the prior exam.      [id number]-3 VIEW   Final Result         1.  Air-filled distended loops of bowel are seen. Appearance raises concern for obstruction, radiographic follow-up to resolution recommended.   2.  Dobbhoff tube tip overlying the expected  location of the pylorus or first duodenal segment.   3.  Hazy bilateral lung base infiltrates      DX-CHEST-PORTABLE (1 VIEW)   Final Result         1.  Pulmonary edema and/or infiltrates are identified, which appear somewhat increased since the prior exam.      DX-ABDOMEN FOR TUBE PLACEMENT   Final Result         1.  Air-filled distended loops of bowel are seen with reactive mucosal pattern, appearance suggests ileus or enteritis. Recommend radiographic followup to resolution to exclude progression to obstruction.   2.  Dobbhoff tube tip overlying the expected location of the pylorus or first duodenal segment.   3.  Hazy bilateral lung base opacities suggests infiltrates      MR-BRAIN-W/O   Final Result      1.  No acute abnormality.   2.  The flow voids of the cerebral vessels particularly left M1 segment is patent.The hyperdensity on the recent CT scan likely represent artifact.   3.  Mild cerebral atrophy.      EC-ECHOCARDIOGRAM COMPLETE W/ CONT   Final Result      CT-HEAD W/O   Final Result   Addendum 1 of 1   Call report initiated at 1:40 PM on 3/1/2019.      These findings were discussed with LARRY WISEMAN on 3/1/2019 2:24 PM.      Final      1.  Subtle increased density of LEFT middle cerebral artery and sylvian branches possibly indicating slow flow or occlusion and potential early sign of acute stroke.   2.  No intracranial hemorrhage.   3.  Mild atrophy.      CT-ABDOMEN-PELVIS W/O   Final Result      Decrease in volume of colonic stool with no abnormal bowel dilatation detected      New third spacing with some new mild abdominopelvic ascites. No free air is seen to indicate extravasation. No loculated fluid collection to suggest abscess is noted      No pneumatosis detected to suggest bowel ischemia         DX-ABDOMEN FOR TUBE PLACEMENT   Final Result      Enteric tube terminates over the gastroduodenal junction.      DX-CHEST-LIMITED (1 VIEW)   Final Result      New right IJ line tip overlies the SVC       No radiographic evidence of acute cardiopulmonary process.      DX-CHEST-PORTABLE (1 VIEW)   Final Result      No acute cardiopulmonary abnormality.      CT-CTA COMPLETE THORACOABDOMINAL AORTA   Final Result      1. No evidence of aortic dissection or aneurysm. No high-grade stenosis or occlusion of major pelvic branches vessels.   2. No pulmonary embolus.   3. Moderate to large amount of stool throughout the colon.                 Assessment/Plan  * Septic shock (HCC)- (present on admission)   Assessment & Plan    This was severe sepsis with the following associated acute organ dysfunction(s): acute kidney failure.   Urinary tract infection source  S/p abx     Acute hypoxemic respiratory failure (HCC)- (present on admission)   Assessment & Plan    S/P extubation 3/9 and doing well on room air  PT/OT, I placed physiatry consult today     Atrial fibrillation and flutter ([facility])   Assessment & Plan    On metoprolol  We will stop amio drip, afib was likely 2/2 sepsis; she has had nothing since     Abdominal distention- (present on admission)   Assessment & Plan    Resolving; GI signed off, no endo intervention needed  BMS came out  S/p neostigmine with good result a few days ago x2  I'm ordering an abdominal flat plate xray, she is still very distenced       Urinary tract infection- (present on admission)   Assessment & Plan    E. Coli  S/p C3     Acute encephalopathy- (present on admission)   Assessment & Plan    Likely 2/2 sepsis, resolved     JOSE (acute kidney injury) ([facility])- (present on admission)   Assessment & Plan    Resolved     DM (diabetes mellitus) ([facility])- (present on admission)   Assessment & Plan    Holding oral hypoglycemics while inpatient  No coverage needed; no ssi     Depression- (present on admission)   Assessment & Plan    Restarted home Elavil      Hypomagnesemia   Assessment & Plan    Replaced     Hypokalemia- (present on admission)   Assessment & Plan    Replaced     Hypocalcemia- (present on  admission)   Assessment & Plan    Replacing     Class 2 severe obesity due to excess calories with serious comorbidity and body mass index (BMI) of 37.0 to 37.9 in adult (HCC)- (present on admission)   Assessment & Plan    Body mass index is 39.52 kg/m².          VTE prophylaxis: sq lovenox

## 2019-03-11 NOTE — THERAPY
"Occupational Therapy Evaluation completed.   Functional Status: Min A supine > < EOB, max A LB dressing, CGA sit > stands & side stepping with FWW   Plan of Care: Will benefit from Occupational Therapy 3 times per week  Discharge Recommendations:  Equipment: Will Continue to Assess for Equipment Needs. Post-acute therapy: Discharge to a transitional care facility for continued skilled therapy services. Recommend physiatry consult.    See \"Rehab Therapy-Acute\" Patient Summary Report for complete documentation.    63 y.o. Female with h/o DMII, HTN, hypothyroid, admitted with sepsis, complicated by acute respiratory failure requiring vent. Pt now extubated. Seen now for OT eval. Pt A+Ox4, appears highly motivated. Pt completed: bed mobility, sit > stand at FWW, side-stepping towards HOB. Declined transfer due to fatigue (pt had just been to [facility] [place] for nursing for first time). Pt is limited by: generalized weakness, balance impairment, activity intolerance, negatively impacting basic ADL and functional mobility. Acute OT to follow with recommendation for post-acute transitional care setting. Please consider physiatry consult.      "

## 2019-03-11 NOTE — ASSESSMENT & PLAN NOTE
Resolved.  S/P extubation 3/9 and now doing well on RA.  - RT to follow PRN  - Increase mobilization

## 2019-03-11 NOTE — CARE PLAN
Bed: ED22  Expected date:   Expected time:   Means of arrival:   Comments:  Amaury-ronal   Problem: Communication  Goal: The ability to communicate needs accurately and effectively will improve    Intervention: Educate patient and significant other/support system about the plan of care, procedures, treatments, medications and allow for questions  Reviewed plan of care and medications with patient.      Problem: Mobility  Goal: Risk for activity intolerance will decrease    Intervention: Encourage patient to increase activity level in collaboration with Interdisciplinary Team  Patient self motivated to mobilize and work with PT/OT

## 2019-03-11 NOTE — CARE PLAN
Problem: Nutritional:  Goal: Achieve adequate nutritional intake  Advance diet as feasible and patient will consume >50% of meals versus initiation of nutrition support.    Outcome: NOT MET     Goal: Nutrition support tolerated and meeting greater than 85% of estimated needs  Outcome: NOT MET

## 2019-03-11 NOTE — DIETARY
Nutrition Services Update: following for inadequate nutrition - now 11 days    Day 11 of admit. Clear liquid diet.  Although pt has had Cortrak placed and tube feeding orders previously - appears tube feed was never started for pt.   Therefore pt has been NPO/clear liquids since 3/1.     Discussed possible diet advancement in interdisciplinary rounds this morning. Pt still has a distended abdomen, therefore MD ordered abdominal x ray to assess prior to diet advancement.     Plan/Recommend:   1. If diet is unable to advance, recommend either trophic tube feeds or TPN to start as pt has been without adequate nutrition since admit to ICU    RD following for diet advancement/nutrition plan of care

## 2019-03-12 PROBLEM — G93.40 ACUTE ENCEPHALOPATHY: Status: RESOLVED | Noted: 2019-03-01 | Resolved: 2019-03-12

## 2019-03-12 LAB
ANION GAP SERPL CALC-SCNC: 8 MMOL/L (ref 0–11.9)
BUN SERPL-MCNC: 6 MG/DL (ref 8–22)
CALCIUM SERPL-MCNC: 7.5 MG/DL (ref 8.5–10.5)
CHLORIDE SERPL-SCNC: 110 MMOL/L (ref 96–112)
CO2 SERPL-SCNC: 24 MMOL/L (ref 20–33)
CREAT SERPL-MCNC: 0.87 MG/DL (ref 0.5–1.4)
ERYTHROCYTE [DISTWIDTH] IN BLOOD BY AUTOMATED COUNT: 47.5 FL (ref 35.9–50)
GLUCOSE SERPL-MCNC: 140 MG/DL (ref 65–99)
HCT VFR BLD AUTO: 27.9 % (ref 37–47)
HGB BLD-MCNC: 8.9 G/DL (ref 12–16)
MAGNESIUM SERPL-MCNC: 1.6 MG/DL (ref 1.5–2.5)
MCH RBC QN AUTO: 28.3 PG (ref 27–33)
MCHC RBC AUTO-ENTMCNC: 31.9 G/DL (ref 33.6–35)
MCV RBC AUTO: 88.9 FL (ref 81.4–97.8)
PLATELET # BLD AUTO: 506 K/UL (ref 164–446)
PMV BLD AUTO: 9.3 FL (ref 9–12.9)
POTASSIUM SERPL-SCNC: 3.4 MMOL/L (ref 3.6–5.5)
RBC # BLD AUTO: 3.14 M/UL (ref 4.2–5.4)
SODIUM SERPL-SCNC: 142 MMOL/L (ref 135–145)
WBC # BLD AUTO: 6.9 K/UL (ref 4.8–10.8)

## 2019-03-12 PROCEDURE — A9270 NON-COVERED ITEM OR SERVICE: HCPCS | Performed by: HOSPITALIST

## 2019-03-12 PROCEDURE — 36415 COLL VENOUS BLD VENIPUNCTURE: CPT

## 2019-03-12 PROCEDURE — 770020 HCHG ROOM/CARE - TELE (206)

## 2019-03-12 PROCEDURE — 99232 SBSQ HOSP IP/OBS MODERATE 35: CPT | Performed by: HOSPITALIST

## 2019-03-12 PROCEDURE — 700102 HCHG RX REV CODE 250 W/ 637 OVERRIDE(OP): Performed by: HOSPITALIST

## 2019-03-12 PROCEDURE — 83735 ASSAY OF MAGNESIUM: CPT

## 2019-03-12 PROCEDURE — 85027 COMPLETE CBC AUTOMATED: CPT

## 2019-03-12 PROCEDURE — 700111 HCHG RX REV CODE 636 W/ 250 OVERRIDE (IP): Performed by: INTERNAL MEDICINE

## 2019-03-12 PROCEDURE — 80048 BASIC METABOLIC PNL TOTAL CA: CPT

## 2019-03-12 PROCEDURE — 700111 HCHG RX REV CODE 636 W/ 250 OVERRIDE (IP): Performed by: HOSPITALIST

## 2019-03-12 RX ORDER — MAGNESIUM SULFATE HEPTAHYDRATE 40 MG/ML
2 INJECTION, SOLUTION INTRAVENOUS ONCE
Status: COMPLETED | OUTPATIENT
Start: 2019-03-12 | End: 2019-03-12

## 2019-03-12 RX ORDER — HYDROCODONE BITARTRATE AND ACETAMINOPHEN 5; 325 MG/1; MG/1
1-2 TABLET ORAL EVERY 6 HOURS PRN
Status: DISCONTINUED | OUTPATIENT
Start: 2019-03-12 | End: 2019-03-15 | Stop reason: HOSPADM

## 2019-03-12 RX ORDER — ACETAMINOPHEN 325 MG/1
650 TABLET ORAL EVERY 4 HOURS PRN
Status: DISCONTINUED | OUTPATIENT
Start: 2019-03-12 | End: 2019-03-15 | Stop reason: HOSPADM

## 2019-03-12 RX ORDER — ATORVASTATIN CALCIUM 40 MG/1
40 TABLET, FILM COATED ORAL EVERY EVENING
Status: DISCONTINUED | OUTPATIENT
Start: 2019-03-12 | End: 2019-03-15 | Stop reason: HOSPADM

## 2019-03-12 RX ORDER — LEVOTHYROXINE SODIUM 0.07 MG/1
150 TABLET ORAL
Status: DISCONTINUED | OUTPATIENT
Start: 2019-03-13 | End: 2019-03-15 | Stop reason: HOSPADM

## 2019-03-12 RX ORDER — GABAPENTIN 300 MG/1
300 CAPSULE ORAL 3 TIMES DAILY
Status: DISCONTINUED | OUTPATIENT
Start: 2019-03-12 | End: 2019-03-15 | Stop reason: HOSPADM

## 2019-03-12 RX ORDER — ONDANSETRON 4 MG/1
4 TABLET, ORALLY DISINTEGRATING ORAL EVERY 4 HOURS PRN
Status: DISCONTINUED | OUTPATIENT
Start: 2019-03-12 | End: 2019-03-15 | Stop reason: HOSPADM

## 2019-03-12 RX ORDER — PROMETHAZINE HYDROCHLORIDE 25 MG/1
12.5-25 TABLET ORAL EVERY 4 HOURS PRN
Status: DISCONTINUED | OUTPATIENT
Start: 2019-03-12 | End: 2019-03-15 | Stop reason: HOSPADM

## 2019-03-12 RX ORDER — POLYETHYLENE GLYCOL 3350 17 G/17G
1 POWDER, FOR SOLUTION ORAL
Status: DISCONTINUED | OUTPATIENT
Start: 2019-03-12 | End: 2019-03-15 | Stop reason: HOSPADM

## 2019-03-12 RX ADMIN — ATORVASTATIN CALCIUM 40 MG: 40 TABLET, FILM COATED ORAL at 18:33

## 2019-03-12 RX ADMIN — HYDROCODONE BITARTRATE AND ACETAMINOPHEN 1 TABLET: 5; 325 TABLET ORAL at 16:19

## 2019-03-12 RX ADMIN — HYDROCODONE BITARTRATE AND ACETAMINOPHEN 2 TABLET: 5; 325 TABLET ORAL at 22:21

## 2019-03-12 RX ADMIN — MAGNESIUM SULFATE HEPTAHYDRATE 2 G: 40 INJECTION, SOLUTION INTRAVENOUS at 10:06

## 2019-03-12 RX ADMIN — OMEPRAZOLE 20 MG: 20 CAPSULE, DELAYED RELEASE ORAL at 04:52

## 2019-03-12 RX ADMIN — GABAPENTIN 300 MG: 300 CAPSULE ORAL at 18:33

## 2019-03-12 RX ADMIN — ENOXAPARIN SODIUM 40 MG: 100 INJECTION SUBCUTANEOUS at 04:52

## 2019-03-12 RX ADMIN — LEVOTHYROXINE SODIUM 150 MCG: 75 TABLET ORAL at 04:52

## 2019-03-12 RX ADMIN — AMITRIPTYLINE HYDROCHLORIDE 150 MG: 50 TABLET, FILM COATED ORAL at 20:14

## 2019-03-12 RX ADMIN — METOPROLOL TARTRATE 25 MG: 25 TABLET, FILM COATED ORAL at 18:33

## 2019-03-12 RX ADMIN — METOPROLOL TARTRATE 25 MG: 25 TABLET, FILM COATED ORAL at 04:52

## 2019-03-12 RX ADMIN — GABAPENTIN 300 MG: 300 CAPSULE ORAL at 04:52

## 2019-03-12 RX ADMIN — GABAPENTIN 300 MG: 300 CAPSULE ORAL at 13:06

## 2019-03-12 ASSESSMENT — ENCOUNTER SYMPTOMS
WEAKNESS: 1
COUGH: 0
NAUSEA: 0
PALPITATIONS: 0
HEADACHES: 0
CHILLS: 0
SORE THROAT: 0
BLURRED VISION: 0
FEVER: 0
BLOOD IN STOOL: 0
DIZZINESS: 0
DIARRHEA: 1
MYALGIAS: 0
ABDOMINAL PAIN: 0
SHORTNESS OF BREATH: 0
VOMITING: 0
NERVOUS/ANXIOUS: 1

## 2019-03-12 NOTE — RESPIRATORY CARE
COPD EDUCATION by COPD CLINICAL EDUCATOR  3/12/2019 at 7:49 AM by Deanna Hawk     Patient reviewed by COPD education team. Patient does not qualify for COPD program.

## 2019-03-12 NOTE — PROGRESS NOTES
Bedside report received, Pt care assumed. Tele box on. VSS. Pt assessment complete. Pt AOX4, no signs of distress noted at this time.  Pt c/o of 4/10 pain bilateral extremities aching administered prn tylenol see MAR. Pt denies any additional needs at this time. Bed in lowest position, bed alarm is on, ambulates with one assistance. POC discussed with Pt/family, verbalizes understanding, no questions at this time. Pt educated on fall risk and verbalizes understanding, call light within reach, will continue to monitor.

## 2019-03-12 NOTE — CARE PLAN
Problem: Infection  Goal: Will remain free from infection  Outcome: PROGRESSING AS EXPECTED  Hand hygiene performed before and after contact with patient.       Problem: Venous Thromboembolism (VTW)/Deep Vein Thrombosis (DVT) Prevention:  Goal: Patient will participate in Venous Thrombosis (VTE)/Deep Vein Thrombosis (DVT)Prevention Measures  Outcome: PROGRESSING AS EXPECTED  Pt is on lovenox sq

## 2019-03-12 NOTE — CARE PLAN
Problem: Nutritional:  Goal: Achieve adequate nutritional intake  Advance diet as feasible and patient will consume >50% of meals versus initiation of nutrition support.     Outcome: PROGRESSING AS EXPECTED  Spoke with MD regarding pt and day#12 essentially of NPO/CLD. AXR 1 view on 3/11 with non-specific gas pattern and moderate distension of colonic loops with no definitive evidence of SBO. Pt with bowel movements note on 3/11 when rectal tube was out. Pt with diet advanced today to Full Liquid and lunch 50-75%.     RD to continue to follow to ensure adequate PO.

## 2019-03-12 NOTE — CARE PLAN
Problem: Safety  Goal: Will remain free from falls  Outcome: PROGRESSING AS EXPECTED  Pt has been free of falls. Safety precautions in place. Will continue to monitor.     Problem: Knowledge Deficit  Goal: Knowledge of disease process/condition, treatment plan, diagnostic tests, and medications will improve  Outcome: PROGRESSING AS EXPECTED  Educated pt on POC which includes vs, mobilization, advancement in diet, labs. Pt verbalized understanding

## 2019-03-12 NOTE — PROGRESS NOTES
[facility] Daily Progress Note    Date of Service  3/12/2019    Chief Complaint  63 y.o. female admitted 2/28/2019 with ureosepsis    Hospital Course    This is a 63 year female who was admitted on 02/28 for sepsis thought to be secondary to UTI. She developed respiratory failure and was intubated.  She had presented with symptoms of  abdominal pain, nausea/vomiting and syncopal episodes, as well as constipation. CT scan in the ER on 02/28 had showed significant constipation without evidence of obstruction.       Interval Problem Update  Still with loose bowel movements, would like her diet advanced.  X-ray shows no transition point. No acute overnight events.    Consultants/Specialty  GI  Intensivist    Code Status  FULL    Disposition  To tele floor, orders in since 3/10    D/W tx team on rounds    Review of Systems  Review of Systems   Constitutional: Positive for malaise/fatigue. Negative for chills and fever.   HENT: Negative for congestion and sore throat.    Eyes: Negative for blurred vision.   Respiratory: Negative for cough and shortness of breath.    Cardiovascular: Negative for chest pain, palpitations and leg swelling.   Gastrointestinal: Positive for diarrhea. Negative for abdominal pain, blood in stool, nausea and vomiting.   Genitourinary: Negative for dysuria.   Musculoskeletal: Negative for myalgias.   Neurological: Positive for weakness (starting to feel stronger). Negative for dizziness and headaches.   Psychiatric/Behavioral: The patient is nervous/anxious.    All other systems reviewed and are negative.       Physical Exam  Temp:  [36 °C (96.8 °F)-36.6 °C (97.8 °F)] 36 °C (96.8 °F)  Pulse:  [61-72] 71  Resp:  [15-20] 16  BP: (117-138)/(49-72) 117/49  SpO2:  [90 %-96 %] 90 %    Physical Exam   Constitutional: She is oriented to person, place, and time.   obese   HENT:   Head: Normocephalic.   Mouth/Throat: Oropharynx is clear and moist.   Eyes: Pupils are equal, round, and reactive to  light. EOM are normal.   Neck: Normal range of motion. No tracheal deviation present.   Cardiovascular: Normal rate, regular rhythm and intact distal pulses.    No murmur heard.  Pulmonary/Chest: Effort normal. No respiratory distress. She has no wheezes. She has no rales.   Abdominal: Soft. Bowel sounds are normal. She exhibits distension. There is no tenderness. There is no rebound.   Musculoskeletal: Normal range of motion. She exhibits no edema.   Neurological: She is alert and oriented to person, place, and time.   Skin: Skin is warm and dry. No erythema.   Psychiatric: Her behavior is normal.   Vitals reviewed.      Fluids    Intake/Output Summary (Last 24 hours) at 03/12/19 0757  Last data filed at 03/11/19 1849   Gross per 24 hour   Intake             1130 ml   Output             1201 ml   Net              -71 ml       Laboratory  Recent Labs      03/10/19   0410  03/11/19   0410  03/12/19   0306   WBC  6.5  6.7  6.9   RBC  2.73*  3.06*  3.14*   HEMOGLOBIN  7.9*  8.6*  8.9*   HEMATOCRIT  25.3*  27.6*  27.9*   MCV  92.7  90.2  88.9   MCH  28.9  28.1  28.3   MCHC  31.2*  31.2*  31.9*   RDW  51.7*  49.2  47.5   PLATELETCT  405  466*  506*   MPV  10.0  9.4  9.3     Recent Labs      03/10/19   0410  03/11/19   0410  03/12/19   0306   SODIUM  142  141  142   POTASSIUM  3.6  3.2*  3.4*   CHLORIDE  111  108  110   CO2  24  26  24   GLUCOSE  117*  119*  140*   BUN  15  10  6*   CREATININE  0.93  0.92  0.87   CALCIUM  7.1*  6.9*  7.5*                   Imaging  [id number] VIEW   Final Result      Nonspecific bowel gas pattern, with moderate air distention of colonic loops. No definitive evidence of small bowel obstruction.      [id number] VIEW   Final Result         1.  Air-filled distended small bowel and colon, appearance suggests ileus or enteritis. Radiographic follow-up to resolution to exclude progression to obstructive change.   2.  Dobbhoff tube with tip terminating overlying the expected location of the  third or fourth duodenal segment.   3.  Nasogastric tube tip terminates overlying the expected location of the pylorus or first duodenal segment.      DX-CHEST-PORTABLE (1 VIEW)   Final Result         1.  Pulmonary edema and/or infiltrates are identified, which are stable since the prior exam.   2.  Atherosclerosis                  [id number] VIEW   Final Result      Moderate air distended bowel loops within the right-sided the abdomen similar to previous findings.      DX-CHEST-PORTABLE (1 VIEW)   Final Result         1.  Pulmonary edema and/or infiltrates are identified, which are stable since the prior exam.   2.  Atherosclerosis               [id number] VIEW   Final Result      Posterior numbering multiple mildly dilated gas-filled loops of bowel in the inferior and mid abdomen. Nonspecific findings which could be seen in the setting of partial small bowel obstruction or ileus.      DX-CHEST-PORTABLE (1 VIEW)   Final Result         1.  Pulmonary edema and/or infiltrates are identified, which are stable since the prior exam.   2.  Atherosclerosis            CT-ABDOMEN-PELVIS WITH   Final Result      1.  Increased pulmonary opacification in the lung bases and small pleural fluid collections.      2.  Some increase in dilatation of the large bowel including the right colon and cecum compared to the prior exam. Findings could be due to ileus. Partial obstruction is possible but no obstructing lesion is appreciated.      3.  Mildly dilated small bowel loops again noted.      4.  Edema in subcutaneous fat is also somewhat increased compared to the prior exam.         DX-CHEST-PORTABLE (1 VIEW)   Final Result         1.  Pulmonary edema and/or infiltrates are identified, which are somewhat decreased since the prior exam.   2.  Atherosclerosis         DX-ABDOMEN FOR TUBE PLACEMENT   Final Result         1.  Filled distended bowel loops, appearance suggests ileus or evolving obstructive changes.   2.  Nasogastric  tube tip terminates overlying the expected location of the gastric body.   3.  Pulmonary edema and/or infiltrates      DX-CHEST-PORTABLE (1 VIEW)   Final Result         1.  Pulmonary edema and/or infiltrates are identified, which appear somewhat increased since the prior exam.      [id number] VIEW   Final Result         1.  Air-filled distended loops of bowel are seen. Appearance raises concern for obstruction, radiographic follow-up to resolution recommended.   2.  Dobbhoff tube tip overlying the expected location of the pylorus or first duodenal segment.   3.  Hazy bilateral lung base infiltrates      DX-CHEST-PORTABLE (1 VIEW)   Final Result         1.  Pulmonary edema and/or infiltrates are identified, which appear somewhat increased since the prior exam.      DX-ABDOMEN FOR TUBE PLACEMENT   Final Result         1.  Air-filled distended loops of bowel are seen with reactive mucosal pattern, appearance suggests ileus or enteritis. Recommend radiographic followup to resolution to exclude progression to obstruction.   2.  Dobbhoff tube tip overlying the expected location of the pylorus or first duodenal segment.   3.  Hazy bilateral lung base opacities suggests infiltrates      MR-BRAIN-W/O   Final Result      1.  No acute abnormality.   2.  The flow voids of the cerebral vessels particularly left M1 segment is patent.The hyperdensity on the recent CT scan likely represent artifact.   3.  Mild cerebral atrophy.      EC-ECHOCARDIOGRAM COMPLETE W/ CONT   Final Result      CT-HEAD W/O   Final Result   Addendum 1 of 1   Call report initiated at 1:40 PM on 3/1/2019.      These findings were discussed with LARRY WISEMAN on 3/1/2019 2:24 PM.      Final      1.  Subtle increased density of LEFT middle cerebral artery and sylvian branches possibly indicating slow flow or occlusion and potential early sign of acute stroke.   2.  No intracranial hemorrhage.   3.  Mild atrophy.      CT-ABDOMEN-PELVIS W/O   Final Result       Decrease in volume of colonic stool with no abnormal bowel dilatation detected      New third spacing with some new mild abdominopelvic ascites. No free air is seen to indicate extravasation. No loculated fluid collection to suggest abscess is noted      No pneumatosis detected to suggest bowel ischemia         DX-ABDOMEN FOR TUBE PLACEMENT   Final Result      Enteric tube terminates over the gastroduodenal junction.      DX-CHEST-LIMITED (1 VIEW)   Final Result      New right IJ line tip overlies the SVC      No radiographic evidence of acute cardiopulmonary process.      DX-CHEST-PORTABLE (1 VIEW)   Final Result      No acute cardiopulmonary abnormality.      CT-CTA COMPLETE THORACOABDOMINAL AORTA   Final Result      1. No evidence of aortic dissection or aneurysm. No high-grade stenosis or occlusion of major pelvic branches vessels.   2. No pulmonary embolus.   3. Moderate to large amount of stool throughout the colon.                 Assessment/Plan  * Septic shock (HCC)- (present on admission)   Assessment & Plan    Resolved.  This was severe sepsis with the following associated acute organ dysfunction(s): acute kidney failure. Urinary tract infection source  - S/p abx     Acute hypoxemic respiratory failure (HCC)- (present on admission)   Assessment & Plan    Resolved.  S/P extubation 3/9 and now doing well on RA  - PT/OT  -Increase mobilization     Atrial fibrillation and flutter (HCC)   Assessment & Plan    Now rate controlled.   A. fib likely secondary to sepsis.  -Status post amnio drip   -Continue metoprolol  -Telemetry monitoring     Abdominal distention- (present on admission)   Assessment & Plan    Resolving; no longer tympanic.   - GI signed off, no endo intervention needed  - S/p neostigmine with good result a few days ago x2  -Advance diet to full liquids     Urinary tract infection- (present on admission)   Assessment & Plan    urine culture with E. coli.  - S/p ceftriaxone     JOSE (acute kidney  injury) ([facility])- (present on admission)   Assessment & Plan    Resolved.  Secondary to UTI.      DM (diabetes mellitus) ([facility])- (present on admission)   Assessment & Plan    Holding oral hypoglycemics while inpatient  No coverage needed; no ssi     Depression- (present on admission)   Assessment & Plan    Restarted home Elavil      Hypomagnesemia   Assessment & Plan    Low at 1.6.    -Monitor and replete      Hypokalemia- (present on admission)   Assessment & Plan    Low at 3.2, improved today.  In the setting of hypomagnesemia   -Monitor and replete     Hypocalcemia- (present on admission)   Assessment & Plan    Replacing     Class 2 severe obesity due to excess calories with serious comorbidity and body mass index (BMI) of 37.0 to 37.9 in adult ([facility])- (present on admission)   Assessment & Plan    Body mass index is 39.52 kg/m².          VTE prophylaxis: sq lovenox

## 2019-03-13 PROBLEM — R79.89 ABNORMAL LFTS: Status: RESOLVED | Noted: 2019-02-28 | Resolved: 2019-03-13

## 2019-03-13 PROCEDURE — 700102 HCHG RX REV CODE 250 W/ 637 OVERRIDE(OP): Performed by: HOSPITALIST

## 2019-03-13 PROCEDURE — 99232 SBSQ HOSP IP/OBS MODERATE 35: CPT | Performed by: HOSPITALIST

## 2019-03-13 PROCEDURE — A9270 NON-COVERED ITEM OR SERVICE: HCPCS | Performed by: HOSPITALIST

## 2019-03-13 PROCEDURE — 700111 HCHG RX REV CODE 636 W/ 250 OVERRIDE (IP): Performed by: INTERNAL MEDICINE

## 2019-03-13 PROCEDURE — 97116 GAIT TRAINING THERAPY: CPT

## 2019-03-13 PROCEDURE — 97530 THERAPEUTIC ACTIVITIES: CPT

## 2019-03-13 PROCEDURE — 770020 HCHG ROOM/CARE - TELE (206)

## 2019-03-13 RX ORDER — HYDROCODONE BITARTRATE AND ACETAMINOPHEN 5; 325 MG/1; MG/1
1-2 TABLET ORAL EVERY 4 HOURS PRN
Qty: 20 TAB | Refills: 0 | Status: SHIPPED | OUTPATIENT
Start: 2019-03-13 | End: 2019-03-16

## 2019-03-13 RX ORDER — POTASSIUM CHLORIDE 20 MEQ/1
40 TABLET, EXTENDED RELEASE ORAL ONCE
Status: COMPLETED | OUTPATIENT
Start: 2019-03-13 | End: 2019-03-13

## 2019-03-13 RX ADMIN — METOPROLOL TARTRATE 25 MG: 25 TABLET, FILM COATED ORAL at 17:23

## 2019-03-13 RX ADMIN — AMITRIPTYLINE HYDROCHLORIDE 150 MG: 50 TABLET, FILM COATED ORAL at 19:44

## 2019-03-13 RX ADMIN — POTASSIUM CHLORIDE 40 MEQ: 1500 TABLET, EXTENDED RELEASE ORAL at 09:33

## 2019-03-13 RX ADMIN — ATORVASTATIN CALCIUM 40 MG: 40 TABLET, FILM COATED ORAL at 17:23

## 2019-03-13 RX ADMIN — GABAPENTIN 300 MG: 300 CAPSULE ORAL at 17:23

## 2019-03-13 RX ADMIN — GABAPENTIN 300 MG: 300 CAPSULE ORAL at 05:27

## 2019-03-13 RX ADMIN — GABAPENTIN 300 MG: 300 CAPSULE ORAL at 12:04

## 2019-03-13 RX ADMIN — HYDROCODONE BITARTRATE AND ACETAMINOPHEN 2 TABLET: 5; 325 TABLET ORAL at 21:34

## 2019-03-13 RX ADMIN — HYDROCODONE BITARTRATE AND ACETAMINOPHEN 2 TABLET: 5; 325 TABLET ORAL at 08:04

## 2019-03-13 RX ADMIN — ENOXAPARIN SODIUM 40 MG: 100 INJECTION SUBCUTANEOUS at 05:27

## 2019-03-13 RX ADMIN — OMEPRAZOLE 20 MG: 20 CAPSULE, DELAYED RELEASE ORAL at 05:27

## 2019-03-13 RX ADMIN — LEVOTHYROXINE SODIUM 150 MCG: 75 TABLET ORAL at 05:27

## 2019-03-13 RX ADMIN — METOPROLOL TARTRATE 25 MG: 25 TABLET, FILM COATED ORAL at 05:27

## 2019-03-13 RX ADMIN — HYDROCODONE BITARTRATE AND ACETAMINOPHEN 1 TABLET: 5; 325 TABLET ORAL at 14:10

## 2019-03-13 ASSESSMENT — ENCOUNTER SYMPTOMS
DIZZINESS: 0
MYALGIAS: 0
COUGH: 0
FEVER: 0
SHORTNESS OF BREATH: 0
BLOOD IN STOOL: 0
NERVOUS/ANXIOUS: 1
NAUSEA: 0
WEAKNESS: 1
ABDOMINAL PAIN: 0
SORE THROAT: 0
CHILLS: 0
HEADACHES: 0
DIARRHEA: 1
PALPITATIONS: 0
BLURRED VISION: 0
VOMITING: 0

## 2019-03-13 ASSESSMENT — COGNITIVE AND FUNCTIONAL STATUS - GENERAL
CLIMB 3 TO 5 STEPS WITH RAILING: A LOT
MOVING FROM LYING ON BACK TO SITTING ON SIDE OF FLAT BED: A LITTLE
TURNING FROM BACK TO SIDE WHILE IN FLAT BAD: A LITTLE
WALKING IN HOSPITAL ROOM: A LITTLE
MOVING TO AND FROM BED TO CHAIR: A LITTLE
SUGGESTED CMS G CODE MODIFIER MOBILITY: CK
STANDING UP FROM CHAIR USING ARMS: A LITTLE
MOBILITY SCORE: 17

## 2019-03-13 ASSESSMENT — GAIT ASSESSMENTS
DISTANCE (FEET): 50
DEVIATION: OTHER (COMMENT)
GAIT LEVEL OF ASSIST: STAND BY ASSIST
ASSISTIVE DEVICE: FRONT WHEEL WALKER

## 2019-03-13 NOTE — CARE PLAN
Problem: Safety  Goal: Will remain free from falls  Outcome: PROGRESSING AS EXPECTED  Pt has been free of falls. Safety precautions in place. Will continue to monitor.     Problem: Knowledge Deficit  Goal: Knowledge of disease process/condition, treatment plan, diagnostic tests, and medications will improve  Outcome: PROGRESSING AS EXPECTED  Pt educated on POC which includes advancing diet, vs, PT/OT. Pt verbalized understanding

## 2019-03-13 NOTE — THERAPY
"Physical Therapy Treatment completed.   Bed Mobility:  Supine to Sit: Stand by Assist (HOB elevated)  Transfers: Sit to Stand: Contact Guard Assist  Gait: Level Of Assist: Stand by Assist with Front-Wheel Walker       Plan of Care: Will benefit from Physical Therapy 3 times per week  Discharge Recommendations: Equipment: patient has FWW. Post-acute therapy: see below.    See \"Rehab Therapy-Acute\" Patient Summary Report for complete documentation.     Patient progressing as expected. Patient used bed features for bed mobility but performed with SBA. Patient ambulated approximately 50ft x2 with FWW and SBA, required CGA for STS from EOB, chair, toilet. Patient reported dizziness during ambulation but returned to baseline with seated rest. Directed patient to receive meals in chair and walk with RN staff as able to progress functional mobility. Continue to recommend post acute placement prior to return home. Will continue to follow.   "

## 2019-03-13 NOTE — CARE PLAN
Problem: Safety  Goal: Will remain free from falls  Outcome: PROGRESSING AS EXPECTED  Pt is A&Ox4. Bed alarm in place. Treaded slipper socks on pt. Educated to call light. Personal belongings within reach. Remains free from falls at this time.    Problem: Knowledge Deficit  Goal: Knowledge of disease process/condition, treatment plan, diagnostic tests, and medications will improve  Outcome: PROGRESSING AS EXPECTED  Patient is educated of disease process and condition. Treatment team has included patient in plan of care. All medications indications and side effects are explained. Patient is encouraged to ask questions. Patient indicates understanding.

## 2019-03-13 NOTE — DISCHARGE PLANNING
[facility]  Spoke To: Manuela   Outcome: Received voicemail at 1500 from Manuela that they are still verifying insurance and a copy of the patient's insurance card is needed. [phone]

## 2019-03-13 NOTE — PROGRESS NOTES
[facility] Medicine Daily Progress Note    Date of Service  3/13/2019    Chief Complaint  63 y.o. female admitted 2/28/2019 with ureosepsis    Hospital Course    This is a 63 year female who was admitted on 02/28 for sepsis thought to be secondary to UTI. She developed respiratory failure and was intubated.  She had presented with symptoms of  abdominal pain, nausea/vomiting and syncopal episodes, as well as constipation. CT scan in the ER on 02/28 had showed significant constipation without evidence of obstruction.       Interval Problem Update  Doing well with advancing her diet. Still with loose BMs but improving, less frequent.     Consultants/Specialty  GI  Intensivist    Code Status  FULL    Disposition  To SNF pending bed availability     Review of Systems  Review of Systems   Constitutional: Positive for malaise/fatigue. Negative for chills and fever.   HENT: Negative for congestion and sore throat.    Eyes: Negative for blurred vision.   Respiratory: Negative for cough and shortness of breath.    Cardiovascular: Negative for chest pain, palpitations and leg swelling.   Gastrointestinal: Positive for diarrhea (improving). Negative for abdominal pain, blood in stool, nausea and vomiting.   Genitourinary: Negative for dysuria.   Musculoskeletal: Negative for myalgias.   Neurological: Positive for weakness (starting to feel stronger). Negative for dizziness and headaches.   Psychiatric/Behavioral: The patient is nervous/anxious.    All other systems reviewed and are negative.       Physical Exam  Temp:  [36.1 °C (96.9 °F)-36.7 °C (98.1 °F)] 36.3 °C (97.3 °F)  Pulse:  [63-83] 76  Resp:  [18-19] 18  BP: (113-150)/(51-80) 118/59  SpO2:  [91 %-96 %] 91 %    Physical Exam   Constitutional: She is oriented to person, place, and time. She appears well-developed. No distress.   obese   HENT:   Head: Normocephalic.   Mouth/Throat: Oropharynx is clear and moist.   Eyes: EOM are normal.   Neck: Normal range of motion. No  tracheal deviation present.   Cardiovascular: Normal rate, regular rhythm and intact distal pulses.    No murmur heard.  Pulmonary/Chest: Effort normal. No respiratory distress. She has no wheezes. She has no rales.   Abdominal: Soft. Bowel sounds are normal. She exhibits distension. There is no tenderness. There is no rebound.   Musculoskeletal: Normal range of motion. She exhibits no edema or tenderness.   Neurological: She is alert and oriented to person, place, and time.   Skin: Skin is warm and dry. She is not diaphoretic. No erythema.   Psychiatric: She has a normal mood and affect. Her behavior is normal.   Vitals reviewed.      Fluids    Intake/Output Summary (Last 24 hours) at 03/13/19 0838  Last data filed at 03/12/19 1636   Gross per 24 hour   Intake             1160 ml   Output                0 ml   Net             1160 ml       Laboratory  Recent Labs      03/11/19   0410  03/12/19   0306   WBC  6.7  6.9   RBC  3.06*  3.14*   HEMOGLOBIN  8.6*  8.9*   HEMATOCRIT  27.6*  27.9*   MCV  90.2  88.9   MCH  28.1  28.3   MCHC  31.2*  31.9*   RDW  49.2  47.5   PLATELETCT  466*  506*   MPV  9.4  9.3     Recent Labs      03/11/19   0410  03/12/19   0306   SODIUM  141  142   POTASSIUM  3.2*  3.4*   CHLORIDE  108  110   CO2  26  24   GLUCOSE  119*  140*   BUN  10  6*   CREATININE  0.92  0.87   CALCIUM  6.9*  7.5*                   Imaging  [id number] VIEW   Final Result      Nonspecific bowel gas pattern, with moderate air distention of colonic loops. No definitive evidence of small bowel obstruction.      [id number] VIEW   Final Result         1.  Air-filled distended small bowel and colon, appearance suggests ileus or enteritis. Radiographic follow-up to resolution to exclude progression to obstructive change.   2.  Dobbhoff tube with tip terminating overlying the expected location of the third or fourth duodenal segment.   3.  Nasogastric tube tip terminates overlying the expected location of the pylorus or  first duodenal segment.      DX-CHEST-PORTABLE (1 VIEW)   Final Result         1.  Pulmonary edema and/or infiltrates are identified, which are stable since the prior exam.   2.  Atherosclerosis                  [id number] VIEW   Final Result      Moderate air distended bowel loops within the right-sided the abdomen similar to previous findings.      DX-CHEST-PORTABLE (1 VIEW)   Final Result         1.  Pulmonary edema and/or infiltrates are identified, which are stable since the prior exam.   2.  Atherosclerosis               [id number] VIEW   Final Result      Posterior numbering multiple mildly dilated gas-filled loops of bowel in the inferior and mid abdomen. Nonspecific findings which could be seen in the setting of partial small bowel obstruction or ileus.      DX-CHEST-PORTABLE (1 VIEW)   Final Result         1.  Pulmonary edema and/or infiltrates are identified, which are stable since the prior exam.   2.  Atherosclerosis            CT-ABDOMEN-PELVIS WITH   Final Result      1.  Increased pulmonary opacification in the lung bases and small pleural fluid collections.      2.  Some increase in dilatation of the large bowel including the right colon and cecum compared to the prior exam. Findings could be due to ileus. Partial obstruction is possible but no obstructing lesion is appreciated.      3.  Mildly dilated small bowel loops again noted.      4.  Edema in subcutaneous fat is also somewhat increased compared to the prior exam.         DX-CHEST-PORTABLE (1 VIEW)   Final Result         1.  Pulmonary edema and/or infiltrates are identified, which are somewhat decreased since the prior exam.   2.  Atherosclerosis         DX-ABDOMEN FOR TUBE PLACEMENT   Final Result         1.  Filled distended bowel loops, appearance suggests ileus or evolving obstructive changes.   2.  Nasogastric tube tip terminates overlying the expected location of the gastric body.   3.  Pulmonary edema and/or infiltrates       DX-CHEST-PORTABLE (1 VIEW)   Final Result         1.  Pulmonary edema and/or infiltrates are identified, which appear somewhat increased since the prior exam.      [id number] VIEW   Final Result         1.  Air-filled distended loops of bowel are seen. Appearance raises concern for obstruction, radiographic follow-up to resolution recommended.   2.  Dobbhoff tube tip overlying the expected location of the pylorus or first duodenal segment.   3.  Hazy bilateral lung base infiltrates      DX-CHEST-PORTABLE (1 VIEW)   Final Result         1.  Pulmonary edema and/or infiltrates are identified, which appear somewhat increased since the prior exam.      DX-ABDOMEN FOR TUBE PLACEMENT   Final Result         1.  Air-filled distended loops of bowel are seen with reactive mucosal pattern, appearance suggests ileus or enteritis. Recommend radiographic followup to resolution to exclude progression to obstruction.   2.  Dobbhoff tube tip overlying the expected location of the pylorus or first duodenal segment.   3.  Hazy bilateral lung base opacities suggests infiltrates      MR-BRAIN-W/O   Final Result      1.  No acute abnormality.   2.  The flow voids of the cerebral vessels particularly left M1 segment is patent.The hyperdensity on the recent CT scan likely represent artifact.   3.  Mild cerebral atrophy.      EC-ECHOCARDIOGRAM COMPLETE W/ CONT   Final Result      CT-HEAD W/O   Final Result   Addendum 1 of 1   Call report initiated at 1:40 PM on 3/1/2019.      These findings were discussed with LARRY WISEMAN on 3/1/2019 2:24 PM.      Final      1.  Subtle increased density of LEFT middle cerebral artery and sylvian branches possibly indicating slow flow or occlusion and potential early sign of acute stroke.   2.  No intracranial hemorrhage.   3.  Mild atrophy.      CT-ABDOMEN-PELVIS W/O   Final Result      Decrease in volume of colonic stool with no abnormal bowel dilatation detected      New third spacing with some new  mild abdominopelvic ascites. No free air is seen to indicate extravasation. No loculated fluid collection to suggest abscess is noted      No pneumatosis detected to suggest bowel ischemia         DX-ABDOMEN FOR TUBE PLACEMENT   Final Result      Enteric tube terminates over the gastroduodenal junction.      DX-CHEST-LIMITED (1 VIEW)   Final Result      New right IJ line tip overlies the SVC      No radiographic evidence of acute cardiopulmonary process.      DX-CHEST-PORTABLE (1 VIEW)   Final Result      No acute cardiopulmonary abnormality.      CT-CTA COMPLETE THORACOABDOMINAL AORTA   Final Result      1. No evidence of aortic dissection or aneurysm. No high-grade stenosis or occlusion of major pelvic branches vessels.   2. No pulmonary embolus.   3. Moderate to large amount of stool throughout the colon.                 Assessment/Plan  * Septic shock (HCC)- (present on admission)   Assessment & Plan    Resolved.  This was severe sepsis with the following associated acute organ dysfunction(s): acute kidney failure. Urinary tract infection source  - S/p abx, ended 3/9     Acute hypoxemic respiratory failure (HCC)- (present on admission)   Assessment & Plan    Resolved.  S/P extubation 3/9 and now doing well on RA.  - continue PT/OT  -Increase mobilization     Atrial fibrillation and flutter (HCC)   Assessment & Plan    Now rate controlled.   A. fib likely secondary to sepsis.  -Status post amnio drip   -Continue metoprolol  -Telemetry monitoring     Abdominal distention- (present on admission)   Assessment & Plan    Improving; no longer tympanic. Having BMs, no ileus or obstruction  - GI signed off, no endo intervention needed  - S/p neostigmine with good result x2 while in the ICU  -Advance diet as tolerated  - bowel regimen PRN     Urinary tract infection- (present on admission)   Assessment & Plan    urine culture with E. coli.  - S/p ceftriaxone --> zosyn     JOSE (acute kidney injury) (HCC)- (present on  admission)   Assessment & Plan    Resolved.  Secondary to UTI.      DM (diabetes mellitus) ([facility])- (present on admission)   Assessment & Plan    Takes metformin at home. A1C 6.9%  - Holding oral hypoglycemics while inpatient  - No coverage needed; no ssi     Depression- (present on admission)   Assessment & Plan    Restarted home Elavil      Hypomagnesemia   Assessment & Plan    Low at 1.6.    -Monitor and replete      Hypokalemia- (present on admission)   Assessment & Plan    Low at 3.2, improved today.  In the setting of hypomagnesemia   -Monitor and replete     Hypocalcemia- (present on admission)   Assessment & Plan    Improved to 7.5.  -Replacing     Class 2 severe obesity due to excess calories with serious comorbidity and body mass index (BMI) of 37.0 to 37.9 in adult ([facility])- (present on admission)   Assessment & Plan    Body mass index is 41.61 kg/m².          VTE prophylaxis: sq lovenox

## 2019-03-13 NOTE — DISCHARGE PLANNING
Anticipated Discharge Disposition: SNF for skilled therapies then home with sister.    Action: RN CM obtained SNF CHOICE from pt and faxed to [facility] at ext. [phone]. Questions answered and support given.    Barriers to Discharge: Medical clearance, accepting snf.    Plan: Notify pt and IDT when accepting snf is obtained.

## 2019-03-13 NOTE — DISCHARGE PLANNING
Agency/Facility Name: [facility]  Spoke To: Manuela  Outcome: Under review. Manuela will call back with update when able.    Agency/Facility Name: [facility]  Spoke To: Danya   Outcome: Under review. Danya will call back with update.

## 2019-03-13 NOTE — PROGRESS NOTES
Bedside report completed. Pt lying in bed comfortably. A/O x 4, bed alarm is on. Safety precautions in place. Call light and personal belongings within reach. Pt educated on POC and all questions answered at this time.  Educated patient on calling for assistance when needed. All pt needs are met at this time.  Will continue to monitor.

## 2019-03-13 NOTE — DISCHARGE PLANNING
Received Choice form at 1010  Agency/Facility Name: [facility]  Referral sent per Choice form @ 1015

## 2019-03-14 PROBLEM — N17.9 AKI (ACUTE KIDNEY INJURY) (HCC): Status: RESOLVED | Noted: 2019-02-28 | Resolved: 2019-03-14

## 2019-03-14 LAB
ANION GAP SERPL CALC-SCNC: 9 MMOL/L (ref 0–11.9)
BUN SERPL-MCNC: 5 MG/DL (ref 8–22)
CALCIUM SERPL-MCNC: 6.8 MG/DL (ref 8.5–10.5)
CHLORIDE SERPL-SCNC: 108 MMOL/L (ref 96–112)
CO2 SERPL-SCNC: 23 MMOL/L (ref 20–33)
CREAT SERPL-MCNC: 0.99 MG/DL (ref 0.5–1.4)
GLUCOSE SERPL-MCNC: 129 MG/DL (ref 65–99)
MAGNESIUM SERPL-MCNC: 1.4 MG/DL (ref 1.5–2.5)
POTASSIUM SERPL-SCNC: 3.6 MMOL/L (ref 3.6–5.5)
SODIUM SERPL-SCNC: 140 MMOL/L (ref 135–145)

## 2019-03-14 PROCEDURE — 99232 SBSQ HOSP IP/OBS MODERATE 35: CPT | Performed by: HOSPITALIST

## 2019-03-14 PROCEDURE — A9270 NON-COVERED ITEM OR SERVICE: HCPCS | Performed by: HOSPITALIST

## 2019-03-14 PROCEDURE — 700111 HCHG RX REV CODE 636 W/ 250 OVERRIDE (IP): Performed by: HOSPITALIST

## 2019-03-14 PROCEDURE — 770020 HCHG ROOM/CARE - TELE (206)

## 2019-03-14 PROCEDURE — 700102 HCHG RX REV CODE 250 W/ 637 OVERRIDE(OP): Performed by: HOSPITALIST

## 2019-03-14 PROCEDURE — 80048 BASIC METABOLIC PNL TOTAL CA: CPT

## 2019-03-14 PROCEDURE — 700111 HCHG RX REV CODE 636 W/ 250 OVERRIDE (IP): Performed by: INTERNAL MEDICINE

## 2019-03-14 PROCEDURE — 36415 COLL VENOUS BLD VENIPUNCTURE: CPT

## 2019-03-14 PROCEDURE — 700105 HCHG RX REV CODE 258: Performed by: HOSPITALIST

## 2019-03-14 PROCEDURE — 92526 ORAL FUNCTION THERAPY: CPT

## 2019-03-14 PROCEDURE — 83735 ASSAY OF MAGNESIUM: CPT

## 2019-03-14 RX ORDER — MAGNESIUM SULFATE HEPTAHYDRATE 40 MG/ML
4 INJECTION, SOLUTION INTRAVENOUS ONCE
Status: COMPLETED | OUTPATIENT
Start: 2019-03-14 | End: 2019-03-14

## 2019-03-14 RX ORDER — CALCIUM GLUCONATE 94 MG/ML
1 INJECTION, SOLUTION INTRAVENOUS ONCE
Status: DISCONTINUED | OUTPATIENT
Start: 2019-03-14 | End: 2019-03-14

## 2019-03-14 RX ORDER — MAGNESIUM SULFATE HEPTAHYDRATE 40 MG/ML
2 INJECTION, SOLUTION INTRAVENOUS ONCE
Status: COMPLETED | OUTPATIENT
Start: 2019-03-14 | End: 2019-03-14

## 2019-03-14 RX ADMIN — METOPROLOL TARTRATE 25 MG: 25 TABLET, FILM COATED ORAL at 04:38

## 2019-03-14 RX ADMIN — ATORVASTATIN CALCIUM 40 MG: 40 TABLET, FILM COATED ORAL at 17:03

## 2019-03-14 RX ADMIN — GABAPENTIN 300 MG: 300 CAPSULE ORAL at 04:38

## 2019-03-14 RX ADMIN — AMITRIPTYLINE HYDROCHLORIDE 150 MG: 50 TABLET, FILM COATED ORAL at 19:52

## 2019-03-14 RX ADMIN — GABAPENTIN 300 MG: 300 CAPSULE ORAL at 17:03

## 2019-03-14 RX ADMIN — ENOXAPARIN SODIUM 40 MG: 100 INJECTION SUBCUTANEOUS at 04:39

## 2019-03-14 RX ADMIN — HYDROCODONE BITARTRATE AND ACETAMINOPHEN 2 TABLET: 5; 325 TABLET ORAL at 04:40

## 2019-03-14 RX ADMIN — METOPROLOL TARTRATE 25 MG: 25 TABLET, FILM COATED ORAL at 17:03

## 2019-03-14 RX ADMIN — LEVOTHYROXINE SODIUM 150 MCG: 75 TABLET ORAL at 04:38

## 2019-03-14 RX ADMIN — CALCIUM GLUCONATE 1 G: 98 INJECTION, SOLUTION INTRAVENOUS at 09:08

## 2019-03-14 RX ADMIN — HYDROCODONE BITARTRATE AND ACETAMINOPHEN 2 TABLET: 5; 325 TABLET ORAL at 17:03

## 2019-03-14 RX ADMIN — MAGNESIUM SULFATE IN WATER 2 G: 40 INJECTION, SOLUTION INTRAVENOUS at 04:43

## 2019-03-14 RX ADMIN — MAGNESIUM SULFATE IN WATER 4 G: 40 INJECTION, SOLUTION INTRAVENOUS at 09:08

## 2019-03-14 RX ADMIN — OMEPRAZOLE 20 MG: 20 CAPSULE, DELAYED RELEASE ORAL at 04:38

## 2019-03-14 RX ADMIN — HYDROCODONE BITARTRATE AND ACETAMINOPHEN 2 TABLET: 5; 325 TABLET ORAL at 10:47

## 2019-03-14 RX ADMIN — GABAPENTIN 300 MG: 300 CAPSULE ORAL at 13:22

## 2019-03-14 ASSESSMENT — ENCOUNTER SYMPTOMS
NAUSEA: 0
BLURRED VISION: 0
NERVOUS/ANXIOUS: 0
PSYCHIATRIC NEGATIVE: 1
FEVER: 0
MYALGIAS: 0
DIZZINESS: 0
SORE THROAT: 0
DIARRHEA: 1
FOCAL WEAKNESS: 0
VOMITING: 0
DEPRESSION: 0
CHILLS: 0
PALPITATIONS: 0
BLOOD IN STOOL: 0
WEAKNESS: 1
ABDOMINAL PAIN: 0
HEADACHES: 0
COUGH: 0
SHORTNESS OF BREATH: 0

## 2019-03-14 NOTE — THERAPY
"Speech Language Therapy dysphagia treatment completed.   Functional Status:  Pt seen for dysphagia tx during breakfast meal of soft solids most consistent with D3 textures and thin liquids. Pt's diet was advanced as tolerated from a clear liquid per GI. Pt does not have dentures present (at home). Oral prep is mildly prolonged with min lingual residue for soft solids, requiring thin liquid rinse. No s/sx of aspiration noted across PO intake. Min verbal cues provided for pt to minimize talking while eating and alternate solids/liquids. Pt is at the level for Dysphagia 3 textures, thin liquids as she does not have proper dentition to break down regular solids. SLP to f/u 1-2x times to ensure tolerance.   Recommendations: D3/thins   Plan of Care: Will benefit from Speech Therapy 2 times per week  Post-Acute Therapy: Currently anticipate no further skilled therapy needs once patient is discharged from the inpatient setting.    See \"Rehab Therapy-Acute\" Patient Summary Report for complete documentation.     "

## 2019-03-14 NOTE — DISCHARGE PLANNING
[facility]  Spoke To: Manuela  Outcome: Patient accepted to [facility]. [facility]

## 2019-03-14 NOTE — DISCHARGE PLANNING
Agency/Facility Name: [facility]  Spoke To: Norma  Outcome: Diet orders faxed to Norma at [facility] and Claribel at [employer] per Norma's request.

## 2019-03-14 NOTE — PROGRESS NOTES
Olivia from Lab called with critical result of Calcium of 6.8 at 0353. Critical lab result read back to Olivia.   Dr. Deras notified of critical lab result at 0411.  Critical lab result read back by Dr. Deras.    MD paged at 0353. No call back received. MD re-paged at 0410.       0411: Call back received. MD updated on patients calcium level. MD also notified of pt's Magnesium level of 1.4. MD to order 2g magnesium sulfate IV now. No other orders at this time.

## 2019-03-14 NOTE — DISCHARGE PLANNING
Agency/Facility Name: [facility]   Spoke To: Norma  Outcome: Auth still pending, Norma will call as soon as anything changes.    [facility]  Outcome: Left voicemail with admissions to follow up on insurance verification and referral status.

## 2019-03-14 NOTE — DISCHARGE PLANNING
Agency/Facility Name: [facility]  Spoke To: Norma  Outcome: Patient authorization #[id number] approved. STACEY MARTIN notified. Will send Discharge Summary to [facility] as soon as available and call to set up transport tomorrow.

## 2019-03-14 NOTE — PROGRESS NOTES
[facility] Medicine Daily Progress Note    Date of Service  3/14/2019    Chief Complaint  63 y.o. female admitted 2/28/2019 with ureosepsis    Hospital Course    This is a 63 year female who was admitted on 02/28 for sepsis thought to be secondary to UTI. She developed respiratory failure and was intubated.  She had presented with symptoms of  abdominal pain, nausea/vomiting and syncopal episodes, as well as constipation. CT scan in the ER on 02/28 had showed significant constipation without evidence of obstruction.       Interval Problem Update  Still with diarrhea but not as frequent. Tolerating her GI soft diet, will advance to regular. No acute overnight events.    Consultants/Specialty  GI  Intensivist    Code Status  FULL    Disposition  To SNF pending bed availability     Review of Systems  Review of Systems   Constitutional: Negative for chills, fever and malaise/fatigue.   HENT: Negative for congestion and sore throat.    Eyes: Negative for blurred vision.   Respiratory: Negative for cough and shortness of breath.    Cardiovascular: Negative for chest pain, palpitations and leg swelling.   Gastrointestinal: Positive for diarrhea (improving). Negative for abdominal pain, blood in stool, nausea and vomiting.   Genitourinary: Negative for dysuria.   Musculoskeletal: Negative for myalgias.   Neurological: Positive for weakness (starting to feel stronger). Negative for dizziness, focal weakness and headaches.   Psychiatric/Behavioral: Negative.  Negative for depression. The patient is not nervous/anxious.    All other systems reviewed and are negative.       Physical Exam  Temp:  [36.1 °C (97 °F)-36.8 °C (98.2 °F)] 36.6 °C (97.9 °F)  Pulse:  [61-96] 96  Resp:  [18] 18  BP: ()/(49-79) 153/79  SpO2:  [92 %-97 %] 93 %    Physical Exam   Constitutional: She is oriented to person, place, and time. She appears well-developed. No distress.   obese   HENT:   Head: Normocephalic.   Mouth/Throat: Oropharynx is clear  and moist.   Eyes: EOM are normal.   Neck: Normal range of motion. No tracheal deviation present.   Cardiovascular: Normal rate, regular rhythm and intact distal pulses.    No murmur heard.  Pulmonary/Chest: Effort normal. No respiratory distress. She has no rales.   Abdominal: Soft. Bowel sounds are normal. She exhibits distension. There is no tenderness. There is no rebound.   Obese abdomen   Musculoskeletal: Normal range of motion. She exhibits no edema.   Neurological: She is alert and oriented to person, place, and time.   Skin: Skin is warm and dry. She is not diaphoretic. No erythema.   Psychiatric: She has a normal mood and affect. Her speech is normal and behavior is normal.   Vitals reviewed.      Fluids    Intake/Output Summary (Last 24 hours) at 03/14/19 0801  Last data filed at 03/14/19 0643   Gross per 24 hour   Intake              840 ml   Output                0 ml   Net              840 ml       Laboratory  Recent Labs      03/12/19   0306   WBC  6.9   RBC  3.14*   HEMOGLOBIN  8.9*   HEMATOCRIT  27.9*   MCV  88.9   MCH  28.3   MCHC  31.9*   RDW  47.5   PLATELETCT  506*   MPV  9.3     Recent Labs      03/12/19   0306  03/14/19   0259   SODIUM  142  140   POTASSIUM  3.4*  3.6   CHLORIDE  110  108   CO2  24  23   GLUCOSE  140*  129*   BUN  6*  5*   CREATININE  0.87  0.99   CALCIUM  7.5*  6.8*                   Imaging  [id number] VIEW   Final Result      Nonspecific bowel gas pattern, with moderate air distention of colonic loops. No definitive evidence of small bowel obstruction.      [id number] VIEW   Final Result         1.  Air-filled distended small bowel and colon, appearance suggests ileus or enteritis. Radiographic follow-up to resolution to exclude progression to obstructive change.   2.  Dobbhoff tube with tip terminating overlying the expected location of the third or fourth duodenal segment.   3.  Nasogastric tube tip terminates overlying the expected location of the pylorus or first  duodenal segment.      DX-CHEST-PORTABLE (1 VIEW)   Final Result         1.  Pulmonary edema and/or infiltrates are identified, which are stable since the prior exam.   2.  Atherosclerosis                  [id number] VIEW   Final Result      Moderate air distended bowel loops within the right-sided the abdomen similar to previous findings.      DX-CHEST-PORTABLE (1 VIEW)   Final Result         1.  Pulmonary edema and/or infiltrates are identified, which are stable since the prior exam.   2.  Atherosclerosis               [id number] VIEW   Final Result      Posterior numbering multiple mildly dilated gas-filled loops of bowel in the inferior and mid abdomen. Nonspecific findings which could be seen in the setting of partial small bowel obstruction or ileus.      DX-CHEST-PORTABLE (1 VIEW)   Final Result         1.  Pulmonary edema and/or infiltrates are identified, which are stable since the prior exam.   2.  Atherosclerosis            CT-ABDOMEN-PELVIS WITH   Final Result      1.  Increased pulmonary opacification in the lung bases and small pleural fluid collections.      2.  Some increase in dilatation of the large bowel including the right colon and cecum compared to the prior exam. Findings could be due to ileus. Partial obstruction is possible but no obstructing lesion is appreciated.      3.  Mildly dilated small bowel loops again noted.      4.  Edema in subcutaneous fat is also somewhat increased compared to the prior exam.         DX-CHEST-PORTABLE (1 VIEW)   Final Result         1.  Pulmonary edema and/or infiltrates are identified, which are somewhat decreased since the prior exam.   2.  Atherosclerosis         DX-ABDOMEN FOR TUBE PLACEMENT   Final Result         1.  Filled distended bowel loops, appearance suggests ileus or evolving obstructive changes.   2.  Nasogastric tube tip terminates overlying the expected location of the gastric body.   3.  Pulmonary edema and/or infiltrates       DX-CHEST-PORTABLE (1 VIEW)   Final Result         1.  Pulmonary edema and/or infiltrates are identified, which appear somewhat increased since the prior exam.      [id number] VIEW   Final Result         1.  Air-filled distended loops of bowel are seen. Appearance raises concern for obstruction, radiographic follow-up to resolution recommended.   2.  Dobbhoff tube tip overlying the expected location of the pylorus or first duodenal segment.   3.  Hazy bilateral lung base infiltrates      DX-CHEST-PORTABLE (1 VIEW)   Final Result         1.  Pulmonary edema and/or infiltrates are identified, which appear somewhat increased since the prior exam.      DX-ABDOMEN FOR TUBE PLACEMENT   Final Result         1.  Air-filled distended loops of bowel are seen with reactive mucosal pattern, appearance suggests ileus or enteritis. Recommend radiographic followup to resolution to exclude progression to obstruction.   2.  Dobbhoff tube tip overlying the expected location of the pylorus or first duodenal segment.   3.  Hazy bilateral lung base opacities suggests infiltrates      MR-BRAIN-W/O   Final Result      1.  No acute abnormality.   2.  The flow voids of the cerebral vessels particularly left M1 segment is patent.The hyperdensity on the recent CT scan likely represent artifact.   3.  Mild cerebral atrophy.      EC-ECHOCARDIOGRAM COMPLETE W/ CONT   Final Result      CT-HEAD W/O   Final Result   Addendum 1 of 1   Call report initiated at 1:40 PM on 3/1/2019.      These findings were discussed with LARRY WISEMAN on 3/1/2019 2:24 PM.      Final      1.  Subtle increased density of LEFT middle cerebral artery and sylvian branches possibly indicating slow flow or occlusion and potential early sign of acute stroke.   2.  No intracranial hemorrhage.   3.  Mild atrophy.      CT-ABDOMEN-PELVIS W/O   Final Result      Decrease in volume of colonic stool with no abnormal bowel dilatation detected      New third spacing with some new  mild abdominopelvic ascites. No free air is seen to indicate extravasation. No loculated fluid collection to suggest abscess is noted      No pneumatosis detected to suggest bowel ischemia         DX-ABDOMEN FOR TUBE PLACEMENT   Final Result      Enteric tube terminates over the gastroduodenal junction.      DX-CHEST-LIMITED (1 VIEW)   Final Result      New right IJ line tip overlies the SVC      No radiographic evidence of acute cardiopulmonary process.      DX-CHEST-PORTABLE (1 VIEW)   Final Result      No acute cardiopulmonary abnormality.      CT-CTA COMPLETE THORACOABDOMINAL AORTA   Final Result      1. No evidence of aortic dissection or aneurysm. No high-grade stenosis or occlusion of major pelvic branches vessels.   2. No pulmonary embolus.   3. Moderate to large amount of stool throughout the colon.                 Assessment/Plan  * Septic shock (HCC)- (present on admission)   Assessment & Plan    Resolved.  This was severe sepsis with the following associated acute organ dysfunction(s): acute kidney failure. Urinary tract infection source  - S/p abx, ended 3/9  - continue with PT/OT, SNF when bed available     Acute hypoxemic respiratory failure (HCC)- (present on admission)   Assessment & Plan    Resolved.  S/P extubation 3/9 and now doing well on RA.  - RT to follow PRN  - Increase mobilization     Atrial fibrillation and flutter (HCC)   Assessment & Plan    Now rate controlled.   A. fib likely secondary to sepsis.  -Status post amnio drip   -Continue metoprolol  -Telemetry monitoring     Abdominal distention- (present on admission)   Assessment & Plan    Improving; no longer tympanic. Having BMs, no ileus or obstruction. 2 watery BMs since this AM.  - GI signed off, no endo intervention needed  - S/p neostigmine with good result x2 while in the ICU  - Advance diet as tolerated  - bowel regimen PRN     Urinary tract infection- (present on admission)   Assessment & Plan    urine culture with E. coli.  -  S/p ceftriaxone --> zosyn     DM (diabetes mellitus) ([facility])- (present on admission)   Assessment & Plan    Takes metformin at home. A1C 6.9%  - Holding oral hypoglycemics while inpatient  - No coverage needed; no ssi     Depression- (present on admission)   Assessment & Plan    Restarted home Elavil      Hypomagnesemia   Assessment & Plan    Low at 1.4  -Monitor and replete      Hypokalemia- (present on admission)   Assessment & Plan    Low at 3.2, improved today.  In the setting of hypomagnesemia   -Monitor and replete     Hypocalcemia- (present on admission)   Assessment & Plan    Down to 6.8 this AM  -Replacing     Class 2 severe obesity due to excess calories with serious comorbidity and body mass index (BMI) of 37.0 to 37.9 in adult ([facility])- (present on admission)   Assessment & Plan    Body mass index is 41.44 kg/m².          VTE prophylaxis: sq lovenox

## 2019-03-14 NOTE — PROGRESS NOTES
Bedside report completed. Pt lying in bed comfortably. A/O x 4. Safety precautions in place. Call light and personal belongings within reach. Pt educated on POC and all questions answered at this time.  Educated patient on calling for assistance when needed. All pt needs are met at this time.  Will continue to monitor.

## 2019-03-14 NOTE — PROGRESS NOTES
Bedside report received from night nurse. Assumed care of pt. Pt resting comfortably in bed without any signs of distress. A/Ox4, VSS. No complaints at this time. POC reviewed and white board updated. Tele box on. Call light in reach. Bed locked in lowest position with 2 upper bed rails up. Bed alarm on, under pt correctly, and plugge in correctly.

## 2019-03-14 NOTE — CARE PLAN
Problem: Communication  Goal: The ability to communicate needs accurately and effectively will improve  Outcome: PROGRESSING AS EXPECTED  Patient educated to utilize call light. Patient oriented to hospital room. Patient encouraged to ask questions about plan of care. Patient effectively uses call light and is involved in POC.    Problem: Venous Thromboembolism (VTW)/Deep Vein Thrombosis (DVT) Prevention:  Goal: Patient will participate in Venous Thrombosis (VTE)/Deep Vein Thrombosis (DVT)Prevention Measures  Outcome: PROGRESSING AS EXPECTED  Patient educated on DVT risks. Patient VS are stable. No signs of DVT. Will continue to monitor for change status.

## 2019-03-14 NOTE — DISCHARGE PLANNING
Agency/Facility Name: [facility]  Spoke To: Danya  Outcome: She talked to Medicare and they are processing.  Awaiting decision from Medicare.

## 2019-03-14 NOTE — DISCHARGE PLANNING
Agency/Facility Name: Medicare - [employer]  Spoke To: Claribel  Outcome: Per Claribel, Sent referral and records to direct fax [phone]. Also provided [facility] nurse phone number [phone].

## 2019-03-14 NOTE — DISCHARGE PLANNING
Agency/Facility Name: [facility]  Spoke To: Norma Sanford  Outcome: [facility] Liaison to visit patient before insurance will authorize. Pending Auth #[id number].

## 2019-03-14 NOTE — DISCHARGE PLANNING
Agency/Facility Name: [facility]  Spoke To: Norma  Outcome: Patient's insurance informed [facility] that their records show this patient as discharged. [facility] needs documentation to show that this patient is still admitted at [facility]. STACEY Klein notified.

## 2019-03-15 VITALS
OXYGEN SATURATION: 94 % | HEART RATE: 66 BPM | RESPIRATION RATE: 18 BRPM | DIASTOLIC BLOOD PRESSURE: 56 MMHG | TEMPERATURE: 96.8 F | SYSTOLIC BLOOD PRESSURE: 121 MMHG | WEIGHT: 271.83 LBS | BODY MASS INDEX: 41.2 KG/M2 | HEIGHT: 68 IN

## 2019-03-15 PROBLEM — R65.21 SEPTIC SHOCK (HCC): Status: RESOLVED | Noted: 2019-02-28 | Resolved: 2019-03-15

## 2019-03-15 PROBLEM — A41.9 SEPTIC SHOCK (HCC): Status: RESOLVED | Noted: 2019-02-28 | Resolved: 2019-03-15

## 2019-03-15 PROBLEM — N39.0 URINARY TRACT INFECTION: Status: RESOLVED | Noted: 2019-02-28 | Resolved: 2019-03-15

## 2019-03-15 PROBLEM — E83.42 HYPOMAGNESEMIA: Status: RESOLVED | Noted: 2019-03-04 | Resolved: 2019-03-15

## 2019-03-15 PROBLEM — R14.0 ABDOMINAL DISTENTION: Status: RESOLVED | Noted: 2019-02-28 | Resolved: 2019-03-15

## 2019-03-15 PROBLEM — J96.01 ACUTE HYPOXEMIC RESPIRATORY FAILURE (HCC): Status: RESOLVED | Noted: 2019-03-05 | Resolved: 2019-03-15

## 2019-03-15 LAB
ANION GAP SERPL CALC-SCNC: 9 MMOL/L (ref 0–11.9)
BUN SERPL-MCNC: 7 MG/DL (ref 8–22)
CALCIUM SERPL-MCNC: 6.5 MG/DL (ref 8.5–10.5)
CHLORIDE SERPL-SCNC: 107 MMOL/L (ref 96–112)
CO2 SERPL-SCNC: 23 MMOL/L (ref 20–33)
CREAT SERPL-MCNC: 0.85 MG/DL (ref 0.5–1.4)
GLUCOSE SERPL-MCNC: 148 MG/DL (ref 65–99)
MAGNESIUM SERPL-MCNC: 1.7 MG/DL (ref 1.5–2.5)
POTASSIUM SERPL-SCNC: 3.4 MMOL/L (ref 3.6–5.5)
SODIUM SERPL-SCNC: 139 MMOL/L (ref 135–145)

## 2019-03-15 PROCEDURE — 80048 BASIC METABOLIC PNL TOTAL CA: CPT

## 2019-03-15 PROCEDURE — 700102 HCHG RX REV CODE 250 W/ 637 OVERRIDE(OP): Performed by: HOSPITALIST

## 2019-03-15 PROCEDURE — 700105 HCHG RX REV CODE 258: Performed by: INTERNAL MEDICINE

## 2019-03-15 PROCEDURE — 36415 COLL VENOUS BLD VENIPUNCTURE: CPT

## 2019-03-15 PROCEDURE — 700111 HCHG RX REV CODE 636 W/ 250 OVERRIDE (IP): Performed by: INTERNAL MEDICINE

## 2019-03-15 PROCEDURE — 99239 HOSP IP/OBS DSCHRG MGMT >30: CPT | Performed by: HOSPITALIST

## 2019-03-15 PROCEDURE — A9270 NON-COVERED ITEM OR SERVICE: HCPCS | Performed by: HOSPITALIST

## 2019-03-15 PROCEDURE — 700111 HCHG RX REV CODE 636 W/ 250 OVERRIDE (IP): Performed by: HOSPITALIST

## 2019-03-15 PROCEDURE — 83735 ASSAY OF MAGNESIUM: CPT

## 2019-03-15 RX ORDER — LOPERAMIDE HYDROCHLORIDE 2 MG/1
2 CAPSULE ORAL 2 TIMES DAILY PRN
Start: 2019-03-15

## 2019-03-15 RX ORDER — MAGNESIUM SULFATE HEPTAHYDRATE 40 MG/ML
2 INJECTION, SOLUTION INTRAVENOUS ONCE
Status: COMPLETED | OUTPATIENT
Start: 2019-03-15 | End: 2019-03-15

## 2019-03-15 RX ORDER — CALCIUM CARBONATE 500 MG/1
500 TABLET, CHEWABLE ORAL 2 TIMES DAILY
Qty: 30 TAB
Start: 2019-03-15

## 2019-03-15 RX ORDER — CALCIUM GLUCONATE 94 MG/ML
1 INJECTION, SOLUTION INTRAVENOUS ONCE
Status: DISCONTINUED | OUTPATIENT
Start: 2019-03-15 | End: 2019-03-15

## 2019-03-15 RX ORDER — AMITRIPTYLINE HYDROCHLORIDE 150 MG/1
150 TABLET ORAL
Qty: 30 TAB
Start: 2019-03-15

## 2019-03-15 RX ORDER — CALCIUM CARBONATE 500 MG/1
500 TABLET, CHEWABLE ORAL 2 TIMES DAILY
Status: DISCONTINUED | OUTPATIENT
Start: 2019-03-15 | End: 2019-03-15 | Stop reason: HOSPADM

## 2019-03-15 RX ORDER — POLYETHYLENE GLYCOL 3350 17 G/17G
17 POWDER, FOR SOLUTION ORAL
Start: 2019-03-15

## 2019-03-15 RX ORDER — POTASSIUM CHLORIDE 750 MG/1
10 TABLET, EXTENDED RELEASE ORAL 2 TIMES DAILY
Qty: 90 TAB | Refills: 3
Start: 2019-03-15

## 2019-03-15 RX ORDER — POTASSIUM CHLORIDE 20 MEQ/1
40 TABLET, EXTENDED RELEASE ORAL ONCE
Status: COMPLETED | OUTPATIENT
Start: 2019-03-15 | End: 2019-03-15

## 2019-03-15 RX ADMIN — MAGNESIUM SULFATE IN WATER 2 G: 40 INJECTION, SOLUTION INTRAVENOUS at 04:46

## 2019-03-15 RX ADMIN — GABAPENTIN 300 MG: 300 CAPSULE ORAL at 11:37

## 2019-03-15 RX ADMIN — HYDROCODONE BITARTRATE AND ACETAMINOPHEN 2 TABLET: 5; 325 TABLET ORAL at 04:05

## 2019-03-15 RX ADMIN — ANTACID TABLETS 500 MG: 500 TABLET, CHEWABLE ORAL at 09:08

## 2019-03-15 RX ADMIN — MAGNESIUM SULFATE IN WATER 2 G: 40 INJECTION, SOLUTION INTRAVENOUS at 09:09

## 2019-03-15 RX ADMIN — CALCIUM GLUCONATE 1 G: 98 INJECTION, SOLUTION INTRAVENOUS at 05:06

## 2019-03-15 RX ADMIN — POTASSIUM CHLORIDE 40 MEQ: 1500 TABLET, EXTENDED RELEASE ORAL at 09:08

## 2019-03-15 RX ADMIN — OMEPRAZOLE 20 MG: 20 CAPSULE, DELAYED RELEASE ORAL at 04:47

## 2019-03-15 RX ADMIN — GABAPENTIN 300 MG: 300 CAPSULE ORAL at 04:47

## 2019-03-15 RX ADMIN — LEVOTHYROXINE SODIUM 150 MCG: 75 TABLET ORAL at 04:46

## 2019-03-15 RX ADMIN — HYDROCODONE BITARTRATE AND ACETAMINOPHEN 2 TABLET: 5; 325 TABLET ORAL at 11:37

## 2019-03-15 RX ADMIN — ENOXAPARIN SODIUM 40 MG: 100 INJECTION SUBCUTANEOUS at 04:47

## 2019-03-15 RX ADMIN — METOPROLOL TARTRATE 25 MG: 25 TABLET, FILM COATED ORAL at 04:46

## 2019-03-15 NOTE — DISCHARGE PLANNING
Agency/Facility Name: [facility]   Spoke To: Norma  Outcome: Bed available, Norma will see if she can set up 1:30 transport and call back to confirm.

## 2019-03-15 NOTE — DISCHARGE INSTRUCTIONS
Discharge Instructions    Discharged to other by medical transportation with escort. Discharged via wheelchair, hospital escort: Yes.  Special equipment needed: Not Applicable    Be sure to schedule a follow-up appointment with your primary care doctor or any specialists as instructed.     Discharge Plan:   Diet Plan: Discussed  Activity Level: Discussed  Smoking Cessation Offered: Patient Counseled  Confirmed Follow up Appointment: Appointment Scheduled  Confirmed Symptoms Management: Discussed  Medication Reconciliation Updated: Yes  Pneumococcal Vaccine Administered/Refused: Not given - Patient refused pneumococcal vaccine  Influenza Vaccine Indication: Not indicated: Previously immunized this influenza season and > 8 years of age    I understand that a diet low in cholesterol, fat, and sodium is recommended for good health. Unless I have been given specific instructions below for another diet, I accept this instruction as my diet prescription.   Other diet: regular, dysphagia 3     Special Instructions: Sepsis, Adult  Sepsis is a serious infection of your blood or tissues that affects your whole body. The infection that causes sepsis may be bacterial, viral, fungal, or parasitic. Sepsis may be life threatening. Sepsis can cause your blood pressure to drop. This may result in shock. Shock causes your central nervous system and your organs to stop working correctly.  What increases the risk?  Sepsis can happen in anyone, but it is more likely to happen in people who have weakened immune systems.  What are the signs or symptoms?  Symptoms of sepsis can include:  · Fever or low body temperature (hypothermia).  · Rapid breathing (hyperventilation).  · Chills.  · Rapid heartbeat (tachycardia).  · Confusion or light-headedness.  · Trouble breathing.  · Urinating much less than usual.  · Cool, clammy skin or red, flushed skin.  · Other problems with the heart, kidneys, or brain.  How is this diagnosed?  Your health care  provider will likely do tests to look for an infection, to see if the infection has spread to your blood, and to see how serious your condition is. Tests can include:  · Blood tests, including cultures of your blood.  · Cultures of other fluids from your body, such as:  ¨ Urine.  ¨ Pus from wounds.  ¨ Mucus coughed up from your lungs.  · Urine tests other than cultures.  · X-ray exams or other imaging tests.  How is this treated?  Treatment will begin with elimination of the source of infection. If your sepsis is likely caused by a bacterial or fungal infection, you will be given antibiotic or antifungal medicines.  You may also receive:  · Oxygen.  · Fluids through an IV tube.  · Medicines to increase your blood pressure.  · A machine to clean your blood (dialysis) if your kidneys fail.  · A machine to help you breathe if your lungs fail.  Get help right away if:  You get an infection or develop any of the signs and symptoms of sepsis after surgery or a hospitalization.  This information is not intended to replace advice given to you by your health care provider. Make sure you discuss any questions you have with your health care provider.  Document Released: 09/15/2004 Document Revised: 05/25/2017 Document Reviewed: 08/25/2014  [employer] Interactive Patient Education © 2017 [employer]      · Is patient discharged on Warfarin / Coumadin?   No     Septic Shock  Septic shock is the final, most serious stage of the body's inflammatory response to an infection (sepsis). Sepsis happens when the chemicals that are produced by your body to fight infection cause inflammation through your entire body (systemic). This can lead to problems with your blood pressure that prevent your organs from getting the oxygen they need. Septic shock results when your organs begin to fail from the drop in blood pressure.  Infections that lead to sepsis often begin in the lungs, abdomen, urinary tract, reproductive system, or digestive  system.  What are the causes?  Septic shock can result from any bacterial, fungal, or viral infection in the body. Septic shock from a viral infection is rare.  What increases the risk?  You may be more likely to develop septic shock from an infection if you:  · Are very young or very old.  · Have AIDS or another disease that weakens your body's defense system (immune system).  · Have diabetes.  · Have lymphoma or leukemia.  · Have a disease of the lungs, intestines, reproductive system, or urinary tract.  · Have been hospitalized for a long time, especially if you are using a catheter.  · Had a recent surgery or medical procedure.  · Have been taking antibiotic medicines or steroid medicines for a long time.  What are the signs or symptoms?  Signs and symptoms of septic shock may include:  · Low blood pressure.  · A rapid heart rate or heart palpitations.  · Shortness of breath.  · Rash or discolored skin.  · A very high or very low body temperature with chills.  · Reduced urine output.  · Feeling lightheaded, weak, or confused.  · Agitation or restlessness.  How is this diagnosed?  Your health care provider can diagnose septic shock based on your symptoms and your medical history. Your health care provider will also perform a physical exam. Tests that will be done to confirm the diagnosis may include:  · Tests to check for infection by trying to grow (culture) bacteria from samples of:  ¨ Blood.  ¨ Urine.  ¨ Brain and spinal fluid (cerebrospinal fluid or CSF).  ¨ Mucus.  ¨ Wound secretions.  · Imaging tests, such as:  ¨ X-rays.  ¨ Ultrasound.  ¨ CT scans.  ¨ MRI.  How is this treated?  Septic shock is a medical emergency. Treatment takes place in a hospital, usually in the intensive care unit (ICU). You may be given antibiotics through an IV tube immediately. Other possible treatments include:  · Medicine to increase blood pressure (vasopressor medicines).  · Steroids to reduce inflammation.  · IV fluids to help  with symptoms of dehydration.  · Respirators or breathing machines to support breathing.  · Insulin to control blood sugar.  · Surgery to clean wounds or remove infected or dead tissue. This is needed in some cases.  · Dialysis.  Get help right away if:  Septic shock is a serious problem that is a medical emergency. Do not wait to see if the symptoms will go away. Get medical help right away. Call your local emergency services (911 in the [place] Do not drive yourself to the hospital.   This information is not intended to replace advice given to you by your health care provider. Make sure you discuss any questions you have with your health care provider.  Document Released: 08/21/2015 Document Revised: 05/25/2017 Document Reviewed: 07/21/2015  [employer] Interactive Patient Education © 2017 [employer]      Urinary Tract Infection, Adult  Introduction  A urinary tract infection (UTI) is an infection of any part of the urinary tract. The urinary tract includes the:  · Kidneys.  · Ureters.  · Bladder.  · Urethra.  These organs make, store, and get rid of pee (urine) in the body.  Follow these instructions at home:  · Take over-the-counter and prescription medicines only as told by your doctor.  · If you were prescribed an antibiotic medicine, take it as told by your doctor. Do not stop taking the antibiotic even if you start to feel better.  · Avoid the following drinks:  ¨ Alcohol.  ¨ Caffeine.  ¨ Tea.  ¨ Carbonated drinks.  · Drink enough fluid to keep your pee clear or pale yellow.  · Keep all follow-up visits as told by your doctor. This is important.  · Make sure to:  ¨ Empty your bladder often and completely. Do not to hold pee for long periods of time.  ¨ Empty your bladder before and after sex.  ¨ Wipe from front to back after a bowel movement if you are female. Use each tissue one time when you wipe.  Contact a doctor if:  · You have back pain.  · You have a fever.  · You feel sick to your stomach  (nauseous).  · You throw up (vomit).  · Your symptoms do not get better after 3 days.  · Your symptoms go away and then come back.  Get help right away if:  · You have very bad back pain.  · You have very bad lower belly (abdominal) pain.  · You are throwing up and cannot keep down any medicines or water.  This information is not intended to replace advice given to you by your health care provider. Make sure you discuss any questions you have with your health care provider.  Document Released: 06/05/2009 Document Revised: 05/25/2017 Document Reviewed: 11/07/2016  © 2017 [employer]      Acute Respiratory Failure, Adult  Acute respiratory failure occurs when there is not enough oxygen passing from your lungs to your body. When this happens, your lungs have trouble removing carbon dioxide from the blood. This causes your blood oxygen level to drop too low as carbon dioxide builds up.  Acute respiratory failure is a medical emergency. It can develop quickly, but it is temporary if treated promptly. Your lung capacity, or how much air your lungs can hold, may improve with time, exercise, and treatment.  What are the causes?  There are many possible causes of acute respiratory failure, including:  · Lung injury.  · Chest injury or damage to the ribs or tissues near the lungs.  · Lung conditions that affect the flow of air and blood into and out of the lungs, such as pneumonia, acute respiratory distress syndrome, and cystic fibrosis.  · Medical conditions, such as strokes or spinal cord injuries, that affect the muscles and nerves that control breathing.  · Blood infection (sepsis).  · Inflammation of the pancreas (pancreatitis).  · A blood clot in the lungs (pulmonary embolism).  · A large-volume blood transfusion.  · Burns.  · Near-drowning.  · Seizure.  · Smoke inhalation.  · Reaction to medicines.  · Alcohol or drug overdose.  What increases the risk?  This condition is more likely to develop in people who have:  · A  blocked airway.  · Asthma.  · A condition or disease that damages or weakens the muscles, nerves, bones, or tissues that are involved in breathing.  · A serious infection.  · A health problem that blocks the unconscious reflex that is involved in breathing, such as hypothyroidism or sleep apnea.  · A lung injury or trauma.  What are the signs or symptoms?  Trouble breathing is the main symptom of acute respiratory failure. Symptoms may also include:  · Rapid breathing.  · Restlessness or anxiety.  · Skin, lips, or fingernails that appear blue (cyanosis).  · Rapid heart rate.  · Abnormal heart rhythms (arrhythmias).  · Confusion or changes in behavior.  · Tiredness or loss of energy.  · Feeling sleepy or having a loss of consciousness.  How is this diagnosed?  Your health care provider can diagnose acute respiratory failure with a medical history and physical exam. During the exam, your health care provider will listen to your heart and check for crackling or wheezing sounds in your lungs. Your may also have tests to confirm the diagnosis and determine what is causing respiratory failure. These tests may include:  · Measuring the amount of oxygen in your blood (pulse oximetry). The measurement comes from a small device that is placed on your finger, earlobe, or toe.  · Other blood tests to measure blood gases and to look for signs of infection.  · Sampling your cerebral spinal fluid or tracheal fluid to check for infections.  · Chest X-ray to look for fluid in spaces that should be filled with air.  · Electrocardiogram (ECG) to look at the heart's electrical activity.  How is this treated?  Treatment for this condition usually takes places in a hospital intensive care unit (ICU). Treatment depends on what is causing the condition. It may include one or more treatments until your symptoms improve. Treatment may include:  · Supplemental oxygen. Extra oxygen is given through a tube in the nose, a face mask, or a  freedman.  · A device such as a continuous positive airway pressure (CPAP) or bi-level positive airway pressure (BiPAP or BPAP) machine. This treatment uses mild air pressure to keep the airways open. A mask or other device will be placed over your nose or mouth. A tube that is connected to a motor will deliver oxygen through the mask.  · Ventilator. This treatment helps move air into and out of the lungs. This may be done with a bag and mask or a machine. For this treatment, a tube is placed in your windpipe (trachea) so air and oxygen can flow to the lungs.  · Extracorporeal membrane oxygenation (ECMO). This treatment temporarily takes over the function of the heart and lungs, supplying oxygen and removing carbon dioxide. ECMO gives the lungs a chance to recover. It may be used if a ventilator is not effective.  · Tracheostomy. This is a procedure that creates a hole in the neck to insert a breathing tube.  · Receiving fluids and medicines.  · Rocking the bed to help breathing.  Follow these instructions at home:  · Take over-the-counter and prescription medicines only as told by your health care provider.  · Return to normal activities as told by your health care provider. Ask your health care provider what activities are safe for you.  · Keep all follow-up visits as told by your health care provider. This is important.  How is this prevented?  Treating infections and medical conditions that may lead to acute respiratory failure can help prevent the condition from developing.  Contact a health care provider if:  · You have a fever.  · Your symptoms do not improve or they get worse.  Get help right away if:  · You are having trouble breathing.  · You lose consciousness.  · Your have cyanosis or turn blue.  · You develop a rapid heart rate.  · You are confused.  These symptoms may represent a serious problem that is an emergency. Do not wait to see if the symptoms will go away. Get medical help right away. Call your  local emergency services (911 in the [place] Do not drive yourself to the hospital.   This information is not intended to replace advice given to you by your health care provider. Make sure you discuss any questions you have with your health care provider.  Document Released: 12/23/2014 Document Revised: 07/15/2017 Document Reviewed: 07/05/2017  [employer] Interactive Patient Education © 2017 [employer]    Depression / Suicide Risk    As you are discharged from this Critical access hospital facility, it is important to learn how to keep safe from harming yourself.    Recognize the warning signs:  · Abrupt changes in personality, positive or negative- including increase in energy   · Giving away possessions  · Change in eating patterns- significant weight changes-  positive or negative  · Change in sleeping patterns- unable to sleep or sleeping all the time   · Unwillingness or inability to communicate  · Depression  · Unusual sadness, discouragement and loneliness  · Talk of wanting to die  · Neglect of personal appearance   · Rebelliousness- reckless behavior  · Withdrawal from people/activities they love  · Confusion- inability to concentrate     If you or a loved one observes any of these behaviors or has concerns about self-harm, here's what you can do:  · Talk about it- your feelings and reasons for harming yourself  · Remove any means that you might use to hurt yourself (examples: pills, rope, extension cords, firearm)  · Get professional help from the community (Mental Health, Substance Abuse, psychological counseling)  · Do not be alone:Call your Safe Contact- someone whom you trust who will be there for you.  · Call your local CRISIS HOTLINE 945-3143 or 397-290-7584  · Call your local Children's Mobile Crisis Response Team [place] (100) 027-7730 or www.[employer]  · Call the toll free National Suicide Prevention Hotlines   · National Suicide Prevention Lifeline 295-536-QRCX (1031)  · National Hope Line  Network 800-SUICIDE (790-6926)

## 2019-03-15 NOTE — PROGRESS NOTES
Olivia from Lab called with critical result of Calcium of 6.5 at 0415. Critical lab result read back to Olivia.   Dr. Last notified of critical lab result at 0425.  Critical lab result read back by Dr. Last.    MD also notified of patients magnesium level of 1.7 this morning.   MD to order 1g of calcium gluconate IV now. MD to order 2g of IV magnesium IV now.

## 2019-03-15 NOTE — CARE PLAN
Problem: Communication  Goal: The ability to communicate needs accurately and effectively will improve  Outcome: PROGRESSING AS EXPECTED  Patient educated to utilize call light. Patient oriented to hospital room. Patient encouraged to ask questions about plan of care. Patient effectively uses call light and is involved in POC.    Problem: Safety  Goal: Will remain free from falls  Outcome: PROGRESSING AS EXPECTED  Pt is A&Ox4. Bed alarm in place. Treaded slipper socks on pt. Educated to call light. Personal belongings within reach. Remains free from falls at this time.

## 2019-03-15 NOTE — DISCHARGE SUMMARY
Discharge Summary    CHIEF COMPLAINT ON ADMISSION  Chief Complaint   Patient presents with   • Back Pain   • Abdominal Pain   • Syncope       Reason for Admission  Back Pain     CODE STATUS  Full Code    HPI & HOSPITAL COURSE  This is a 63 y.o. female with DM2, HTN, obesity, hypothyroidism here with abdominal pain, weakness, and altered mentation. On evaluation she was in septic shock secondary to UTI with hypotension that required pressors, WBC 18.6, JOSE with Cr 1.83, lactic acid of 5.6. She was admitted to the ICU with pulmonary/critical care consultation. She was treated with sepsis protocol, aggressive IVF and empiric antibiotics and de-escalated when urine cultures returned positive with E coli. CT showed no aortic dissection/aneurysm, no PE but with a moderate amount of stool in the colon. CT head and MRI brain without acute pathology or CVA. During her time in the ICU she developed Afib, suspected to be secondary to sepsis, that responded well to digoxin load then amiodarone drip. She converted back into sinus rhythm. She developed worsening respiratory failure requiring intubation from 3/5 - 3/9. General surgery was consulted given her severe constipation and abdominal pain on presentation. She was managed conservatively without surgery. GI was consulted and she was treated for acute colonic pseudo obstruction. She responded well to neostigmine on 3/7 and 3/8. Lactic acidosis, WBC count, and JOSE resolved.    Her mentation returned to normal, she worked with PT/OT who recommended continued therapy prior to discharge home. Her diet was slowly advanced to regular without N/V. Consistently having BMs, no further abdominal pain or distension. She was hypocalcemic, hypokalemic, and hypomagnesemic requiring aggressive repletion. She is to continue on supplementation and have her levels checked in 2-3 days post discharge to titrate or alter her regimen as needed. She was monitored on telemetry    Therefore, she is  discharged in good and stable condition to skilled nursing facility.    The patient met 2-midnight criteria for an inpatient stay at the time of discharge.      FOLLOW UP ITEMS POST DISCHARGE  Please follow up with PCP within 1 week.   Repeat BMP and Magnesium in 2-3 days to check K, Mg, and Ca. Replete as needed.    DISCHARGE DIAGNOSES  Principal Problem (Resolved):    Septic shock ([facility]) POA: Yes  Active Problems:    Atrial fibrillation and flutter ([facility]) POA: No    Depression POA: Yes    DM (diabetes mellitus) ([facility]) POA: Yes    Class 2 severe obesity due to excess calories with serious comorbidity and body mass index (BMI) of 37.0 to 37.9 in adult ([facility]) POA: Yes    Hypocalcemia POA: Yes    Hypokalemia POA: Yes  Resolved Problems:    Urinary tract infection POA: Yes    Abdominal distention POA: Yes    Acute hypoxemic respiratory failure ([facility]) POA: Yes    JOSE (acute kidney injury) ([facility]) POA: Yes    Acute encephalopathy POA: Yes    Abnormal LFTs POA: Unknown    Hypomagnesemia POA: Unknown      FOLLOW UP  Christiane Mclean M.D.  [address]  [address]  [facility] [address]  [phone]      As needed      MEDICATIONS ON DISCHARGE     Medication List      START taking these medications      Instructions   calcium carbonate 500 MG Chew  Commonly known as:  TUMS   Doctor's comments:  For hypocalcemia  Take 1 Tab by mouth 2 Times a Day.  Dose:  500 mg     loperamide 2 MG Caps  Commonly known as:  IMODIUM   Take 1 Cap by mouth 2 times a day as needed for Diarrhea.  Dose:  2 mg     magnesium oxide 400 (241.3 Mg) MG Tabs tablet  Start taking on:  3/16/2019  Commonly known as:  MAG-OX   Take 1 Tab by mouth every day.  Dose:  400 mg     metoprolol 25 MG Tabs  Commonly known as:  LOPRESSOR   Take 1 Tab by mouth 2 Times a Day.  Dose:  25 mg     polyethylene glycol/lytes Pack  Commonly known as:  MIRALAX   Take 1 Packet by mouth 1 time daily as needed (if sennosides and docusate ineffective after 24 hours).  Dose:  17 g    "  potassium chloride SA 10 MEQ Tbcr  Commonly known as:  K-DUR   Take 1 Tab by mouth 2 times a day.  Dose:  10 mEq        CHANGE how you take these medications      Instructions   amitriptyline 150 MG Tabs  What changed:  · how much to take  · when to take this  Commonly known as:  ELAVIL   Take 1 Tab by mouth every bedtime.  Dose:  150 mg        CONTINUE taking these medications      Instructions   gabapentin 300 MG Caps  Commonly known as:  NEURONTIN   Take 300 mg by mouth 3 times a day.  Dose:  300 mg     HYDROcodone-acetaminophen 5-325 MG Tabs per tablet  Commonly known as:  NORCO   Take 1-2 Tabs by mouth every four hours as needed for up to 3 days.  Dose:  1-2 Tab     levothyroxine 150 MCG Tabs  Commonly known as:  SYNTHROID   Take 150 mcg by mouth Every morning on an empty stomach.  Dose:  150 mcg     lisinopril-hydrochlorothiazide 20-25 MG per tablet  Commonly known as:  PRINZIDE, ZESTORETIC   Take 1 Tab by mouth every day.  Dose:  1 Tab     metformin 1000 MG tablet  Commonly known as:  GLUCOPHAGE   Take 1,000 mg by mouth 2 times a day, with meals.  Dose:  1000 mg     pantoprazole 40 MG Tbec  Commonly known as:  PROTONIX   Take 40 mg by mouth every day.  Dose:  40 mg     simvastatin 40 MG Tabs  Commonly known as:  ZOCOR   Take 40 mg by mouth every evening.  Dose:  40 mg     SUPER B COMPLEX/C PO   Take 1 Tab by mouth 2 Times a Day.  Dose:  1 Tab     Vitamin C 1000 MG Tabs   Take 1 Tab by mouth 2 Times a Day.  Dose:  1 Tab        STOP taking these medications    metoprolol SR 25 MG Tb24  Commonly known as:  TOPROL XL     ZANAFLEX 2 MG capsule  Generic drug:  tizanidine            Allergies  Allergies   Allergen Reactions   • Hydromorphone      Swelling   • Tramadol      \"loose short term memory\"       DIET  Orders Placed This Encounter   Procedures   • Diet Order Regular     Standing Status:   Standing     Number of Occurrences:   1     Order Specific Question:   Diet:     Answer:   Regular [1]     Order " Specific Question:   Texture/Fiber modifications:     Answer:   Dysphagia 3(Mechanical Soft)specify fluid consistency(question 6) [3]       ACTIVITY  As tolerated and directed by skilled nursing.  Weight bearing as tolerated    LINES, DRAINS, AND WOUNDS  This is an automated list. Peripheral IVs will be removed prior to discharge.  Peripheral IV 03/10/19 18 G Left Upper arm (Active)   Site Assessment Clean;Dry;Intact 3/14/2019  8:00 PM   Dressing Type Transparent 3/14/2019  8:00 PM   Line Status Flushed;Saline locked 3/14/2019  8:00 PM   Dressing Status Clean;Dry;Intact 3/14/2019  8:00 PM   Dressing Intervention N/A 3/14/2019  8:00 PM   Infiltration Grading ([facility]) 0 3/14/2019  8:00 PM   Phlebitis Scale ([facility] Only) 0 3/14/2019  8:00 PM       Wound 03/11/19 Other (comment) Coccyx (Active)   Site Assessment [place] 3/14/2019  8:00 PM   Asha-wound Assessment Pink 3/14/2019  8:00 PM   Margins Defined edges;Unattached edges 3/13/2019  8:00 PM   Drainage Amount None 3/14/2019  8:00 PM   Cleansing Normal Saline Irrigation 3/13/2019  8:00 AM   Dressing Options Open to Air 3/14/2019  8:00 PM       Surgical Incision  Incision Back (Active)       Peripheral IV 03/10/19 18 G Left Upper arm (Active)   Site Assessment Clean;Dry;Intact 3/14/2019  8:00 PM   Dressing Type Transparent 3/14/2019  8:00 PM   Line Status Flushed;Saline locked 3/14/2019  8:00 PM   Dressing Status Clean;Dry;Intact 3/14/2019  8:00 PM   Dressing Intervention N/A 3/14/2019  8:00 PM   Infiltration Grading ([facility]) 0 3/14/2019  8:00 PM   Phlebitis Scale ([facility]) 0 3/14/2019  8:00 PM               MENTAL STATUS ON TRANSFER  Level of Consciousness: Alert  Orientation : Oriented x 4  Speech: Speech Clear    CONSULTATIONS  Pulmonary/Critical Care  General Surgery  GI    IMAGING AND PROCEDURES    Endotracheal intubation with bronchoscopy and BAL. 3/5/19    [id number] VIEW   Final Result      Nonspecific bowel gas pattern, with moderate air  distention of colonic loops. No definitive evidence of small bowel obstruction.      [id number] VIEW   Final Result         1.  Air-filled distended small bowel and colon, appearance suggests ileus or enteritis. Radiographic follow-up to resolution to exclude progression to obstructive change.   2.  Dobbhoff tube with tip terminating overlying the expected location of the third or fourth duodenal segment.   3.  Nasogastric tube tip terminates overlying the expected location of the pylorus or first duodenal segment.      DX-CHEST-PORTABLE (1 VIEW)   Final Result         1.  Pulmonary edema and/or infiltrates are identified, which are stable since the prior exam.   2.  Atherosclerosis                  [id number]-4 VIEW   Final Result      Moderate air distended bowel loops within the right-sided the abdomen similar to previous findings.      DX-CHEST-PORTABLE (1 VIEW)   Final Result         1.  Pulmonary edema and/or infiltrates are identified, which are stable since the prior exam.   2.  Atherosclerosis               [id number] VIEW   Final Result      Posterior numbering multiple mildly dilated gas-filled loops of bowel in the inferior and mid abdomen. Nonspecific findings which could be seen in the setting of partial small bowel obstruction or ileus.      DX-CHEST-PORTABLE (1 VIEW)   Final Result         1.  Pulmonary edema and/or infiltrates are identified, which are stable since the prior exam.   2.  Atherosclerosis            CT-ABDOMEN-PELVIS WITH   Final Result      1.  Increased pulmonary opacification in the lung bases and small pleural fluid collections.      2.  Some increase in dilatation of the large bowel including the right colon and cecum compared to the prior exam. Findings could be due to ileus. Partial obstruction is possible but no obstructing lesion is appreciated.      3.  Mildly dilated small bowel loops again noted.      4.  Edema in subcutaneous fat is also somewhat increased compared to  the prior exam.         DX-CHEST-PORTABLE (1 VIEW)   Final Result         1.  Pulmonary edema and/or infiltrates are identified, which are somewhat decreased since the prior exam.   2.  Atherosclerosis         DX-ABDOMEN FOR TUBE PLACEMENT   Final Result         1.  Filled distended bowel loops, appearance suggests ileus or evolving obstructive changes.   2.  Nasogastric tube tip terminates overlying the expected location of the gastric body.   3.  Pulmonary edema and/or infiltrates      DX-CHEST-PORTABLE (1 VIEW)   Final Result         1.  Pulmonary edema and/or infiltrates are identified, which appear somewhat increased since the prior exam.      OM-[id number]-4 VIEW   Final Result         1.  Air-filled distended loops of bowel are seen. Appearance raises concern for obstruction, radiographic follow-up to resolution recommended.   2.  Dobbhoff tube tip overlying the expected location of the pylorus or first duodenal segment.   3.  Hazy bilateral lung base infiltrates      DX-CHEST-PORTABLE (1 VIEW)   Final Result         1.  Pulmonary edema and/or infiltrates are identified, which appear somewhat increased since the prior exam.      DX-ABDOMEN FOR TUBE PLACEMENT   Final Result         1.  Air-filled distended loops of bowel are seen with reactive mucosal pattern, appearance suggests ileus or enteritis. Recommend radiographic followup to resolution to exclude progression to obstruction.   2.  Dobbhoff tube tip overlying the expected location of the pylorus or first duodenal segment.   3.  Hazy bilateral lung base opacities suggests infiltrates      MR-BRAIN-W/O   Final Result      1.  No acute abnormality.   2.  The flow voids of the cerebral vessels particularly left M1 segment is patent.The hyperdensity on the recent CT scan likely represent artifact.   3.  Mild cerebral atrophy.      EC-ECHOCARDIOGRAM COMPLETE W/ CONT   Final Result      CT-HEAD W/O   Final Result   Addendum 1 of 1   Call report initiated at 1:40  PM on 3/1/2019.      These findings were discussed with LARRY WISEMAN on 3/1/2019 2:24 PM.      Final      1.  Subtle increased density of LEFT middle cerebral artery and sylvian branches possibly indicating slow flow or occlusion and potential early sign of acute stroke.   2.  No intracranial hemorrhage.   3.  Mild atrophy.      CT-ABDOMEN-PELVIS W/O   Final Result      Decrease in volume of colonic stool with no abnormal bowel dilatation detected      New third spacing with some new mild abdominopelvic ascites. No free air is seen to indicate extravasation. No loculated fluid collection to suggest abscess is noted      No pneumatosis detected to suggest bowel ischemia         DX-ABDOMEN FOR TUBE PLACEMENT   Final Result      Enteric tube terminates over the gastroduodenal junction.      DX-CHEST-LIMITED (1 VIEW)   Final Result      New right IJ line tip overlies the SVC      No radiographic evidence of acute cardiopulmonary process.      DX-CHEST-PORTABLE (1 VIEW)   Final Result      No acute cardiopulmonary abnormality.      CT-CTA COMPLETE THORACOABDOMINAL AORTA   Final Result      1. No evidence of aortic dissection or aneurysm. No high-grade stenosis or occlusion of major pelvic branches vessels.   2. No pulmonary embolus.   3. Moderate to large amount of stool throughout the colon.                  LABORATORY  Lab Results   Component Value Date    SODIUM 139 03/15/2019    POTASSIUM 3.4 (L) 03/15/2019    CHLORIDE 107 03/15/2019    CO2 23 03/15/2019    GLUCOSE 148 (H) 03/15/2019    BUN 7 (L) 03/15/2019    CREATININE 0.85 03/15/2019        Lab Results   Component Value Date    WBC 6.9 03/12/2019    HEMOGLOBIN 8.9 (L) 03/12/2019    HEMATOCRIT 27.9 (L) 03/12/2019    PLATELETCT 506 (H) 03/12/2019        Total time of the discharge process exceeds 37 minutes.

## 2019-03-15 NOTE — CARE PLAN
Problem: Nutritional:  Goal: Achieve adequate nutritional intake  Advance diet as feasible and patient will consume >50% of meals versus initiation of nutrition support.     Outcome: MET Date Met: 03/15/19  PO intake continues to improve. Diet advanced to Regular Dysphagia III on 3/14 and PO intake since advanced to a full liquid diet % x 5. Last BM 3/14 Med/loose/brown. Per MD progress note on 3/14, pt continues with diarrhea though decreasing and starting to feel stronger. Pt with discharge pending availability and transport to be scheduled today per CC note.     Labs: K 3.4 Ca 6.5 BUN 7. (Ca Gluconate 1g given on 3/14).     RD available PRN.

## 2019-03-15 NOTE — PROGRESS NOTES
Bedside report received from night nurse. Assumed care of pt. Pt resting comfortably in bed without any signs of distress. A/Ox4, VSS. No complaints at this time. POC reviewed and white board updated. Tele box on. Call light in reach. Bed locked in lowest position with 2 upper bed rails up. Bed alarm on, under pt correctly, and plugged in correctly.

## 2019-03-15 NOTE — CARE PLAN
Problem: Safety  Goal: Will remain free from falls  Outcome: PROGRESSING AS EXPECTED  Pt educated to use call light before getting out of bed. Call light in reach. Bed locked in lowest position with 2 upper bed rails up. Pt encouraged to sit at edge of bed before standing up. No c/o dizziness. Stand by assistance using walker given to help pt to the bathroom and back to bed. Room floor is free from clutter. Treaded socks on pt. Bed alarm on.       Problem: Knowledge Deficit  Goal: Knowledge of disease process/condition, treatment plan, diagnostic tests, and medications will improve    Intervention: Explain information regarding disease process/condition, treatment plan, diagnostic tests, and medications and document in education  Pt educated on plan of care, medications, pain management, and disease processes. Pt verbalized understanding and was encouraged to ask questions. All questions answered.

## 2019-03-15 NOTE — PROGRESS NOTES
Discharge instructions given and discussed, signed copy in chart. Pt verbalized understanding and all questions answered. All prescriptions reviewed. Patient is Alert & Oriented and discharged to [facility] in stable condition on room air via wheelchair escorted by [facility]. Personal belongings with patient. IV removed and tolerated well. Tele box removed, monitor room notified.

## 2019-03-15 NOTE — DISCHARGE PLANNING
Agency/Facility Name: [facility]  Spoke To: Norma  Outcome: Transport set up for 12:30.    Agency/Facility Name: [facility]  Spoke To: Norma  Outcome: Made Norma aware DC summary faxed to her at [phone].

## 2019-03-15 NOTE — PROGRESS NOTES
Atrial Fibrillation Admission Alert:    Your patient has been identified through our atrial fibrillation admission alert tracking system to have the diagnosis of atrial fibrillation.    After chart review, it was noted that this patient doesn't meet criteria to have a primary diagnosis of atrial fibrillation.    A message has been sent to the attending MD to change this diagnosis.       It looks as though the patient was admitted for septic shock if you feel appropriate, please consider changing primary diagnosis to septic shock.

## 2019-03-26 NOTE — DOCUMENTATION QUERY
[facility]                                                                         Query Response Note      PATIENT:               KARYNA SHRESTHA  ACCT #:                  [id number]  MRN:                       [id number]  :                       1955  ADMIT DATE:       2019 6:06 PM  DISCH DATE:        3/15/2019 12:45 PM  RESPONDING  PROVIDER #:        [id number]           RESPONSE TEXT:    Unable to determine    QUERY TEXT:    Depression Type [id number]    Depression is documented in the Medical Record.  Please specify the type.    NOTE:  If an appropriate response is not listed below, please respond with a new note.              The patient's Clinical Indicators include:  Depression   resume home Prozac and elavil  Query created by: Sawallisch, Beth on 3/21/2019 8:16 AM        Electronically signed by:  FAITH GONZALEZ MD 3/26/2019 3:32 PM

## 2019-04-02 ENCOUNTER — HOME HEALTH ADMISSION (OUTPATIENT)
Dept: HOME HEALTH SERVICES | Facility: HOME HEALTHCARE | Age: 64
End: 2019-04-02
Payer: MEDICARE

## 2019-04-03 LAB
FUNGUS SPEC CULT: NORMAL
SIGNIFICANT IND 70042: NORMAL
SITE SITE: NORMAL
SOURCE SOURCE: NORMAL

## 2019-05-01 LAB
MYCOBACTERIUM SPEC CULT: NORMAL
RHODAMINE-AURAMINE STN SPEC: NORMAL
SIGNIFICANT IND 70042: NORMAL
SITE SITE: NORMAL
SOURCE SOURCE: NORMAL

## 2019-05-31 ENCOUNTER — HOSPITAL ENCOUNTER (OUTPATIENT)
Facility: MEDICAL CENTER | Age: 64
End: 2019-05-31
Payer: MEDICARE

## 2019-06-03 ENCOUNTER — HOSPITAL ENCOUNTER (INPATIENT)
Dept: HOSPITAL 8 - ED | Age: 64
LOS: 4 days | DRG: 871 | End: 2019-06-07
Attending: INTERNAL MEDICINE | Admitting: INTERNAL MEDICINE
Payer: COMMERCIAL

## 2019-06-03 VITALS — WEIGHT: 220.68 LBS | BODY MASS INDEX: 34.64 KG/M2 | HEIGHT: 67 IN

## 2019-06-03 DIAGNOSIS — L89.154: ICD-10-CM

## 2019-06-03 DIAGNOSIS — Z79.899: ICD-10-CM

## 2019-06-03 DIAGNOSIS — E11.9: ICD-10-CM

## 2019-06-03 DIAGNOSIS — Z51.5: ICD-10-CM

## 2019-06-03 DIAGNOSIS — M19.90: ICD-10-CM

## 2019-06-03 DIAGNOSIS — Z74.01: ICD-10-CM

## 2019-06-03 DIAGNOSIS — R31.9: ICD-10-CM

## 2019-06-03 DIAGNOSIS — E87.0: ICD-10-CM

## 2019-06-03 DIAGNOSIS — D63.8: ICD-10-CM

## 2019-06-03 DIAGNOSIS — E66.9: ICD-10-CM

## 2019-06-03 DIAGNOSIS — B96.20: ICD-10-CM

## 2019-06-03 DIAGNOSIS — Z88.8: ICD-10-CM

## 2019-06-03 DIAGNOSIS — N12: ICD-10-CM

## 2019-06-03 DIAGNOSIS — A41.9: Primary | ICD-10-CM

## 2019-06-03 DIAGNOSIS — B95.2: ICD-10-CM

## 2019-06-03 DIAGNOSIS — Z88.6: ICD-10-CM

## 2019-06-03 DIAGNOSIS — E43: ICD-10-CM

## 2019-06-03 DIAGNOSIS — Z66: ICD-10-CM

## 2019-06-03 DIAGNOSIS — L89.152: ICD-10-CM

## 2019-06-03 DIAGNOSIS — I10: ICD-10-CM

## 2019-06-03 DIAGNOSIS — R65.21: ICD-10-CM

## 2019-06-03 DIAGNOSIS — G92: ICD-10-CM

## 2019-06-03 DIAGNOSIS — E03.9: ICD-10-CM

## 2019-06-03 DIAGNOSIS — I48.91: ICD-10-CM

## 2019-06-03 DIAGNOSIS — A04.72: ICD-10-CM

## 2019-06-03 DIAGNOSIS — Z79.84: ICD-10-CM

## 2019-06-03 LAB
ALBUMIN SERPL-MCNC: 1 G/DL (ref 3.4–5)
ALP SERPL-CCNC: 192 U/L (ref 45–117)
ALT SERPL-CCNC: 16 U/L (ref 12–78)
ANION GAP SERPL CALC-SCNC: 9 MMOL/L (ref 5–15)
APTT BLD: 29 SECONDS (ref 25–31)
BASOPHILS # BLD AUTO: 0.03 X10^3/UL (ref 0–0.1)
BASOPHILS NFR BLD AUTO: 0 % (ref 0–1)
BILIRUB SERPL-MCNC: 0.5 MG/DL (ref 0.2–1)
CALCIUM SERPL-MCNC: 7.5 MG/DL (ref 8.5–10.1)
CHLORIDE SERPL-SCNC: 119 MMOL/L (ref 98–107)
CREAT SERPL-MCNC: 1.06 MG/DL (ref 0.55–1.02)
CULTURE INDICATED?: YES
EOSINOPHIL # BLD AUTO: 0 X10^3/UL (ref 0–0.4)
EOSINOPHIL NFR BLD AUTO: 0 % (ref 1–7)
ERYTHROCYTE [DISTWIDTH] IN BLOOD BY AUTOMATED COUNT: 20.4 % (ref 9.6–15.2)
INR PPP: 1.14 (ref 0.93–1.1)
LYMPHOCYTES # BLD AUTO: 2.35 X10^3/UL (ref 1–3.4)
LYMPHOCYTES NFR BLD AUTO: 29 % (ref 22–44)
MCH RBC QN AUTO: 30.2 PG (ref 27–34.8)
MCHC RBC AUTO-ENTMCNC: 31.1 G/DL (ref 32.4–35.8)
MCV RBC AUTO: 97.4 FL (ref 80–100)
MD: NO
MICROSCOPIC: (no result)
MONOCYTES # BLD AUTO: 0.1 X10^3/UL (ref 0.2–0.8)
MONOCYTES NFR BLD AUTO: 1 % (ref 2–9)
NEUTROPHILS # BLD AUTO: 5.61 X10^3/UL (ref 1.8–6.8)
NEUTROPHILS NFR BLD AUTO: 69 % (ref 42–75)
PLATELET # BLD AUTO: 316 X10^3/UL (ref 130–400)
PMV BLD AUTO: 7.7 FL (ref 7.4–10.4)
PROT SERPL-MCNC: 5 G/DL (ref 6.4–8.2)
PROTHROMBIN TIME: 11.9 SECONDS (ref 9.6–11.5)
RBC # BLD AUTO: 3.12 X10^6/UL (ref 3.82–5.3)
TROPONIN I SERPL-MCNC: < 0.015 NG/ML (ref 0–0.04)

## 2019-06-03 PROCEDURE — 82140 ASSAY OF AMMONIA: CPT

## 2019-06-03 PROCEDURE — 85025 COMPLETE CBC W/AUTO DIFF WBC: CPT

## 2019-06-03 PROCEDURE — 80048 BASIC METABOLIC PNL TOTAL CA: CPT

## 2019-06-03 PROCEDURE — 83605 ASSAY OF LACTIC ACID: CPT

## 2019-06-03 PROCEDURE — P9045 ALBUMIN (HUMAN), 5%, 250 ML: HCPCS

## 2019-06-03 PROCEDURE — 84100 ASSAY OF PHOSPHORUS: CPT

## 2019-06-03 PROCEDURE — 83690 ASSAY OF LIPASE: CPT

## 2019-06-03 PROCEDURE — 87427 SHIGA-LIKE TOXIN AG IA: CPT

## 2019-06-03 PROCEDURE — 74018 RADEX ABDOMEN 1 VIEW: CPT

## 2019-06-03 PROCEDURE — 36600 WITHDRAWAL OF ARTERIAL BLOOD: CPT

## 2019-06-03 PROCEDURE — 76770 US EXAM ABDO BACK WALL COMP: CPT

## 2019-06-03 PROCEDURE — 82962 GLUCOSE BLOOD TEST: CPT

## 2019-06-03 PROCEDURE — 82803 BLOOD GASES ANY COMBINATION: CPT

## 2019-06-03 PROCEDURE — 87324 CLOSTRIDIUM AG IA: CPT

## 2019-06-03 PROCEDURE — 93005 ELECTROCARDIOGRAM TRACING: CPT

## 2019-06-03 PROCEDURE — 36415 COLL VENOUS BLD VENIPUNCTURE: CPT

## 2019-06-03 PROCEDURE — 84443 ASSAY THYROID STIM HORMONE: CPT

## 2019-06-03 PROCEDURE — 80053 COMPREHEN METABOLIC PANEL: CPT

## 2019-06-03 PROCEDURE — 87046 STOOL CULTR AEROBIC BACT EA: CPT

## 2019-06-03 PROCEDURE — 70450 CT HEAD/BRAIN W/O DYE: CPT

## 2019-06-03 PROCEDURE — 84145 PROCALCITONIN (PCT): CPT

## 2019-06-03 PROCEDURE — 93306 TTE W/DOPPLER COMPLETE: CPT

## 2019-06-03 PROCEDURE — 0T9B70Z DRAINAGE OF BLADDER WITH DRAINAGE DEVICE, VIA NATURAL OR ARTIFICIAL OPENING: ICD-10-PCS | Performed by: EMERGENCY MEDICINE

## 2019-06-03 PROCEDURE — 84484 ASSAY OF TROPONIN QUANT: CPT

## 2019-06-03 PROCEDURE — 84134 ASSAY OF PREALBUMIN: CPT

## 2019-06-03 PROCEDURE — 02HV33Z INSERTION OF INFUSION DEVICE INTO SUPERIOR VENA CAVA, PERCUTANEOUS APPROACH: ICD-10-PCS | Performed by: EMERGENCY MEDICINE

## 2019-06-03 PROCEDURE — 87081 CULTURE SCREEN ONLY: CPT

## 2019-06-03 PROCEDURE — 71045 X-RAY EXAM CHEST 1 VIEW: CPT

## 2019-06-03 PROCEDURE — 87086 URINE CULTURE/COLONY COUNT: CPT

## 2019-06-03 PROCEDURE — 87077 CULTURE AEROBIC IDENTIFY: CPT

## 2019-06-03 PROCEDURE — 99291 CRITICAL CARE FIRST HOUR: CPT

## 2019-06-03 PROCEDURE — 74177 CT ABD & PELVIS W/CONTRAST: CPT

## 2019-06-03 PROCEDURE — 87040 BLOOD CULTURE FOR BACTERIA: CPT

## 2019-06-03 PROCEDURE — 83735 ASSAY OF MAGNESIUM: CPT

## 2019-06-03 PROCEDURE — 81001 URINALYSIS AUTO W/SCOPE: CPT

## 2019-06-03 PROCEDURE — 84439 ASSAY OF FREE THYROXINE: CPT

## 2019-06-03 PROCEDURE — B548ZZA ULTRASONOGRAPHY OF SUPERIOR VENA CAVA, GUIDANCE: ICD-10-PCS | Performed by: EMERGENCY MEDICINE

## 2019-06-03 PROCEDURE — 87186 SC STD MICRODIL/AGAR DIL: CPT

## 2019-06-03 PROCEDURE — 85610 PROTHROMBIN TIME: CPT

## 2019-06-03 PROCEDURE — 83880 ASSAY OF NATRIURETIC PEPTIDE: CPT

## 2019-06-03 PROCEDURE — 85730 THROMBOPLASTIN TIME PARTIAL: CPT

## 2019-06-03 NOTE — NUR
FIRST BOLUS STILL INFUSING ON PRESSURE BAG. MULTIPLE UNSUCCESSFUL ATTEMPTS AT 
IV STARTS FOR SECOND IV. SBP REMAINS IN THE 70S. ALL OTHER VSS. US GUIDED IV 
BEING ATTEMPTED NOW.

## 2019-06-03 NOTE — NUR
LATE ENTRY:



TEMP ACEVES PLACED. LEFT EJ 18 ESTABLISHED BUT VERY POSITIONAL. IVF CONTINUED IN 
BOTH LINES WITH LITTLE RESPONE IN BP. CHARGE RN NOTIFIED. PT MOVED TO T4 AND 
CENTRAL LINE PLACED. REPORT GIVEN TO KATI CEDENO.

## 2019-06-03 NOTE — NUR
PT RESTING IN ROOM. REMAINS ALTERED. SON AT BEDSIDE TO REORIENT FREQUENTLY. 
VSS. MEDIACTED PER MAR. IVF INFUSING NOW. UA COLLECTED VIA STRAIGHT CATH. LAB 
AT  TO DRAW NOW. CXR COMPLETE. AWAITING RESULTS.

## 2019-06-03 NOTE — NUR
PT GILBERT REMSA FROM [facility] FOR ALTERED MENTAL STATUS X2 WEEKS AND ABNORMAL 
LABS DRAWN YESTERDAY. LAB RESULTS UNKNOWN AT THIS TIME. PT CONNECTED TO ALL 
MONITORS UPON ARRIVAL. AFIB 90S-100S. PER EMS, HR UP TO 180S PTA. PER SON, NO 
HX AFIB. 2L NC PLACED FOR RA O2 SAT 90%. BP 86/50. IV ESTABLISHED IN ROUTE AND 
150ML NS ADMINISTERED PTA FOR HYPOTENSION. EDMD ASSESSMENT COMPLETE. AWAITING 
ORDERS AT THIS TIME.

## 2019-06-04 VITALS — DIASTOLIC BLOOD PRESSURE: 69 MMHG | SYSTOLIC BLOOD PRESSURE: 96 MMHG

## 2019-06-04 LAB
ALBUMIN SERPL-MCNC: 1.3 G/DL (ref 3.4–5)
ALP SERPL-CCNC: 131 U/L (ref 45–117)
ALT SERPL-CCNC: 12 U/L (ref 12–78)
ANION GAP SERPL CALC-SCNC: 12 MMOL/L (ref 5–15)
ANION GAP SERPL CALC-SCNC: 14 MMOL/L (ref 5–15)
BASOPHILS # BLD AUTO: 0.01 X10^3/UL (ref 0–0.1)
BASOPHILS NFR BLD AUTO: 0 % (ref 0–1)
BILIRUB SERPL-MCNC: 0.3 MG/DL (ref 0.2–1)
CALCIUM SERPL-MCNC: 6.5 MG/DL (ref 8.5–10.1)
CALCIUM SERPL-MCNC: 6.6 MG/DL (ref 8.5–10.1)
CHLORIDE SERPL-SCNC: 123 MMOL/L (ref 98–107)
CHLORIDE SERPL-SCNC: 127 MMOL/L (ref 98–107)
CLOSTRIDIUM DIFFICILE ANTIGEN: POSITIVE
CLOSTRIDIUM DIFFICILE TOXIN: POSITIVE
CREAT SERPL-MCNC: 1.07 MG/DL (ref 0.55–1.02)
CREAT SERPL-MCNC: 1.28 MG/DL (ref 0.55–1.02)
EOSINOPHIL # BLD AUTO: 0 X10^3/UL (ref 0–0.4)
EOSINOPHIL NFR BLD AUTO: 0 % (ref 1–7)
ERYTHROCYTE [DISTWIDTH] IN BLOOD BY AUTOMATED COUNT: 19.3 % (ref 9.6–15.2)
LYMPHOCYTES # BLD AUTO: 0.97 X10^3/UL (ref 1–3.4)
LYMPHOCYTES NFR BLD AUTO: 12 % (ref 22–44)
MCH RBC QN AUTO: 29.9 PG (ref 27–34.8)
MCHC RBC AUTO-ENTMCNC: 30.9 G/DL (ref 32.4–35.8)
MCV RBC AUTO: 96.8 FL (ref 80–100)
MD: NO
MONOCYTES # BLD AUTO: 0.08 X10^3/UL (ref 0.2–0.8)
MONOCYTES NFR BLD AUTO: 1 % (ref 2–9)
NEUTROPHILS # BLD AUTO: 7.13 X10^3/UL (ref 1.8–6.8)
NEUTROPHILS NFR BLD AUTO: 87 % (ref 42–75)
O2 FLOW: 5 L/MIN
PLATELET # BLD AUTO: 294 X10^3/UL (ref 130–400)
PMV BLD AUTO: 7.6 FL (ref 7.4–10.4)
PREALB SERPL-MCNC: 5.4 MG/DL (ref 20–40)
PROT SERPL-MCNC: 4.3 G/DL (ref 6.4–8.2)
RBC # BLD AUTO: 3.72 X10^6/UL (ref 3.82–5.3)
TROPONIN I SERPL-MCNC: < 0.015 NG/ML (ref 0–0.04)
TSH SERPL-ACNC: 1.7 MIU/L (ref 0.36–3.74)

## 2019-06-04 RX ADMIN — VANCOMYCIN HYDROCHLORIDE SCH MG: 1 INJECTION, POWDER, LYOPHILIZED, FOR SOLUTION INTRAVENOUS at 12:00

## 2019-06-04 RX ADMIN — TAZOBACTAM SODIUM AND PIPERACILLIN SODIUM SCH MLS/HR: 375; 3 INJECTION, SOLUTION INTRAVENOUS at 19:34

## 2019-06-04 RX ADMIN — ENOXAPARIN SODIUM SCH MG: 40 INJECTION SUBCUTANEOUS at 11:37

## 2019-06-04 RX ADMIN — VANCOMYCIN HYDROCHLORIDE SCH MG: 1 INJECTION, POWDER, LYOPHILIZED, FOR SOLUTION INTRAVENOUS at 19:28

## 2019-06-04 RX ADMIN — SODIUM CHLORIDE SCH MLS/HR: 0.9 INJECTION, SOLUTION INTRAVENOUS at 13:42

## 2019-06-04 RX ADMIN — FAMOTIDINE SCH MG: 10 INJECTION INTRAVENOUS at 11:37

## 2019-06-04 RX ADMIN — INSULIN LISPRO SCH UNITS: 100 INJECTION, SOLUTION INTRAVENOUS; SUBCUTANEOUS at 16:00

## 2019-06-04 RX ADMIN — TAZOBACTAM SODIUM AND PIPERACILLIN SODIUM SCH MLS/HR: 375; 3 INJECTION, SOLUTION INTRAVENOUS at 13:16

## 2019-06-04 RX ADMIN — INSULIN LISPRO SCH UNITS: 100 INJECTION, SOLUTION INTRAVENOUS; SUBCUTANEOUS at 12:21

## 2019-06-04 RX ADMIN — TAZOBACTAM SODIUM AND PIPERACILLIN SODIUM SCH MLS/HR: 375; 3 INJECTION, SOLUTION INTRAVENOUS at 08:39

## 2019-06-04 RX ADMIN — NOREPINEPHRINE BITARTRATE PRN MLS/HR: 1 INJECTION INTRAVENOUS at 07:59

## 2019-06-04 RX ADMIN — LINEZOLID SCH MLS/HR: 600 INJECTION, SOLUTION INTRAVENOUS at 15:06

## 2019-06-04 RX ADMIN — NOREPINEPHRINE BITARTRATE PRN MLS/HR: 1 INJECTION INTRAVENOUS at 01:03

## 2019-06-04 RX ADMIN — NOREPINEPHRINE BITARTRATE PRN MLS/HR: 1 INJECTION INTRAVENOUS at 23:16

## 2019-06-04 RX ADMIN — FAMOTIDINE SCH MG: 10 INJECTION INTRAVENOUS at 20:45

## 2019-06-04 RX ADMIN — INSULIN LISPRO SCH UNITS: 100 INJECTION, SOLUTION INTRAVENOUS; SUBCUTANEOUS at 20:45

## 2019-06-04 RX ADMIN — LINEZOLID SCH MLS/HR: 600 INJECTION, SOLUTION INTRAVENOUS at 02:31

## 2019-06-05 VITALS — SYSTOLIC BLOOD PRESSURE: 88 MMHG | DIASTOLIC BLOOD PRESSURE: 48 MMHG

## 2019-06-05 LAB
<PLATELET ESTIMATE>: ADEQUATE
<PLT MORPHOLOGY>: (no result)
ALBUMIN SERPL-MCNC: 1 G/DL (ref 3.4–5)
ALP SERPL-CCNC: 117 U/L (ref 45–117)
ALT SERPL-CCNC: 11 U/L (ref 12–78)
ANION GAP SERPL CALC-SCNC: 12 MMOL/L (ref 5–15)
ANISOCYTOSIS BLD QL SMEAR: (no result)
BAND#(MANUAL): 0.37 X10^3/UL
BILIRUB SERPL-MCNC: 0.2 MG/DL (ref 0.2–1)
CALCIUM SERPL-MCNC: 6 MG/DL (ref 8.5–10.1)
CHLORIDE SERPL-SCNC: 126 MMOL/L (ref 98–107)
CREAT SERPL-MCNC: 1.19 MG/DL (ref 0.55–1.02)
ERYTHROCYTE [DISTWIDTH] IN BLOOD BY AUTOMATED COUNT: 19.2 % (ref 9.6–15.2)
LYMPH#(MANUAL): 1.38 X10^3/UL (ref 1–3.4)
LYMPHS% (MANUAL): 15 % (ref 22–44)
MCH RBC QN AUTO: 30.2 PG (ref 27–34.8)
MCHC RBC AUTO-ENTMCNC: 30.9 G/DL (ref 32.4–35.8)
MCV RBC AUTO: 97.6 FL (ref 80–100)
MD: YES
NEUTS BAND NFR BLD: 4 % (ref 0–7)
PLATELET # BLD AUTO: 169 X10^3/UL (ref 130–400)
PMV BLD AUTO: 8.2 FL (ref 7.4–10.4)
POLYCHROMASIA BLD QL SMEAR: (no result)
PROT SERPL-MCNC: 3.9 G/DL (ref 6.4–8.2)
RBC # BLD AUTO: 2.73 X10^6/UL (ref 3.82–5.3)
SEG#(MANUAL): 7.45 X10^3/UL (ref 1.8–6.8)
SEGS% (MANUAL): 81 % (ref 42–75)

## 2019-06-05 RX ADMIN — INSULIN LISPRO SCH UNITS: 100 INJECTION, SOLUTION INTRAVENOUS; SUBCUTANEOUS at 16:00

## 2019-06-05 RX ADMIN — NOREPINEPHRINE BITARTRATE PRN MLS/HR: 1 INJECTION INTRAVENOUS at 21:29

## 2019-06-05 RX ADMIN — ENOXAPARIN SODIUM SCH MG: 40 INJECTION SUBCUTANEOUS at 09:40

## 2019-06-05 RX ADMIN — METRONIDAZOLE SCH MLS/HR: 500 INJECTION, SOLUTION INTRAVENOUS at 09:30

## 2019-06-05 RX ADMIN — TAZOBACTAM SODIUM AND PIPERACILLIN SODIUM SCH MLS/HR: 375; 3 INJECTION, SOLUTION INTRAVENOUS at 10:27

## 2019-06-05 RX ADMIN — FAMOTIDINE SCH MG: 10 INJECTION INTRAVENOUS at 09:40

## 2019-06-05 RX ADMIN — ACETAMINOPHEN PRN MG: 325 TABLET, FILM COATED ORAL at 23:48

## 2019-06-05 RX ADMIN — LINEZOLID SCH MLS/HR: 600 INJECTION, SOLUTION INTRAVENOUS at 15:34

## 2019-06-05 RX ADMIN — INSULIN LISPRO SCH UNITS: 100 INJECTION, SOLUTION INTRAVENOUS; SUBCUTANEOUS at 21:31

## 2019-06-05 RX ADMIN — VANCOMYCIN HYDROCHLORIDE SCH MG: 1 INJECTION, POWDER, LYOPHILIZED, FOR SOLUTION INTRAVENOUS at 23:48

## 2019-06-05 RX ADMIN — METRONIDAZOLE SCH MLS/HR: 500 INJECTION, SOLUTION INTRAVENOUS at 16:03

## 2019-06-05 RX ADMIN — VANCOMYCIN HYDROCHLORIDE SCH MG: 1 INJECTION, POWDER, LYOPHILIZED, FOR SOLUTION INTRAVENOUS at 12:20

## 2019-06-05 RX ADMIN — LINEZOLID SCH MLS/HR: 600 INJECTION, SOLUTION INTRAVENOUS at 02:41

## 2019-06-05 RX ADMIN — TAZOBACTAM SODIUM AND PIPERACILLIN SODIUM SCH MLS/HR: 375; 3 INJECTION, SOLUTION INTRAVENOUS at 02:06

## 2019-06-05 RX ADMIN — SODIUM CHLORIDE SCH MLS/HR: 0.9 INJECTION, SOLUTION INTRAVENOUS at 00:18

## 2019-06-05 RX ADMIN — INSULIN LISPRO SCH UNITS: 100 INJECTION, SOLUTION INTRAVENOUS; SUBCUTANEOUS at 08:12

## 2019-06-05 RX ADMIN — TAZOBACTAM SODIUM AND PIPERACILLIN SODIUM SCH MLS/HR: 375; 3 INJECTION, SOLUTION INTRAVENOUS at 21:30

## 2019-06-05 RX ADMIN — TAZOBACTAM SODIUM AND PIPERACILLIN SODIUM SCH MLS/HR: 375; 3 INJECTION, SOLUTION INTRAVENOUS at 15:33

## 2019-06-05 RX ADMIN — METRONIDAZOLE SCH MLS/HR: 500 INJECTION, SOLUTION INTRAVENOUS at 21:29

## 2019-06-05 RX ADMIN — NOREPINEPHRINE BITARTRATE PRN MLS/HR: 1 INJECTION INTRAVENOUS at 10:59

## 2019-06-05 RX ADMIN — SODIUM BICARBONATE SCH MLS/HR: 84 INJECTION INTRAVENOUS at 21:29

## 2019-06-05 RX ADMIN — VANCOMYCIN HYDROCHLORIDE SCH MG: 1 INJECTION, POWDER, LYOPHILIZED, FOR SOLUTION INTRAVENOUS at 17:45

## 2019-06-05 RX ADMIN — SODIUM BICARBONATE SCH MLS/HR: 84 INJECTION INTRAVENOUS at 09:32

## 2019-06-05 RX ADMIN — VANCOMYCIN HYDROCHLORIDE SCH MG: 1 INJECTION, POWDER, LYOPHILIZED, FOR SOLUTION INTRAVENOUS at 05:33

## 2019-06-05 RX ADMIN — VANCOMYCIN HYDROCHLORIDE SCH MG: 1 INJECTION, POWDER, LYOPHILIZED, FOR SOLUTION INTRAVENOUS at 00:07

## 2019-06-05 RX ADMIN — FAMOTIDINE SCH MG: 10 INJECTION INTRAVENOUS at 21:29

## 2019-06-05 RX ADMIN — INSULIN LISPRO SCH UNITS: 100 INJECTION, SOLUTION INTRAVENOUS; SUBCUTANEOUS at 11:00

## 2019-06-06 VITALS — SYSTOLIC BLOOD PRESSURE: 106 MMHG | DIASTOLIC BLOOD PRESSURE: 69 MMHG

## 2019-06-06 LAB
ALBUMIN SERPL-MCNC: 0.9 G/DL (ref 3.4–5)
ALP SERPL-CCNC: 119 U/L (ref 45–117)
ALT SERPL-CCNC: 11 U/L (ref 12–78)
ANION GAP SERPL CALC-SCNC: 12 MMOL/L (ref 5–15)
BASOPHILS # BLD AUTO: 0.03 X10^3/UL (ref 0–0.1)
BASOPHILS NFR BLD AUTO: 0 % (ref 0–1)
BILIRUB SERPL-MCNC: 0.2 MG/DL (ref 0.2–1)
CALCIUM SERPL-MCNC: 5.7 MG/DL (ref 8.5–10.1)
CHLORIDE SERPL-SCNC: 123 MMOL/L (ref 98–107)
CREAT SERPL-MCNC: 1.19 MG/DL (ref 0.55–1.02)
EOSINOPHIL # BLD AUTO: 0 X10^3/UL (ref 0–0.4)
EOSINOPHIL NFR BLD AUTO: 0 % (ref 1–7)
ERYTHROCYTE [DISTWIDTH] IN BLOOD BY AUTOMATED COUNT: 19 % (ref 9.6–15.2)
LYMPHOCYTES # BLD AUTO: 2.2 X10^3/UL (ref 1–3.4)
LYMPHOCYTES NFR BLD AUTO: 21 % (ref 22–44)
MCH RBC QN AUTO: 30.1 PG (ref 27–34.8)
MCHC RBC AUTO-ENTMCNC: 31 G/DL (ref 32.4–35.8)
MCV RBC AUTO: 97.1 FL (ref 80–100)
MD: (no result)
MONOCYTES # BLD AUTO: 0.02 X10^3/UL (ref 0.2–0.8)
MONOCYTES NFR BLD AUTO: 0 % (ref 2–9)
NEUTROPHILS # BLD AUTO: 8.04 X10^3/UL (ref 1.8–6.8)
NEUTROPHILS NFR BLD AUTO: 78 % (ref 42–75)
PLATELET # BLD AUTO: 162 X10^3/UL (ref 130–400)
PMV BLD AUTO: 8.7 FL (ref 7.4–10.4)
PROT SERPL-MCNC: 3.7 G/DL (ref 6.4–8.2)
RBC # BLD AUTO: 2.76 X10^6/UL (ref 3.82–5.3)

## 2019-06-06 RX ADMIN — FAMOTIDINE SCH MG: 10 INJECTION INTRAVENOUS at 18:23

## 2019-06-06 RX ADMIN — FAMOTIDINE SCH MG: 10 INJECTION INTRAVENOUS at 08:51

## 2019-06-06 RX ADMIN — TAZOBACTAM SODIUM AND PIPERACILLIN SODIUM SCH MLS/HR: 375; 3 INJECTION, SOLUTION INTRAVENOUS at 09:29

## 2019-06-06 RX ADMIN — METRONIDAZOLE SCH MLS/HR: 500 INJECTION, SOLUTION INTRAVENOUS at 03:40

## 2019-06-06 RX ADMIN — VANCOMYCIN HYDROCHLORIDE SCH MG: 1 INJECTION, POWDER, LYOPHILIZED, FOR SOLUTION INTRAVENOUS at 05:30

## 2019-06-06 RX ADMIN — TAZOBACTAM SODIUM AND PIPERACILLIN SODIUM SCH MLS/HR: 375; 3 INJECTION, SOLUTION INTRAVENOUS at 03:40

## 2019-06-06 RX ADMIN — ACETAMINOPHEN PRN MG: 325 TABLET, FILM COATED ORAL at 05:29

## 2019-06-06 RX ADMIN — METRONIDAZOLE SCH MLS/HR: 500 INJECTION, SOLUTION INTRAVENOUS at 08:51

## 2019-06-06 RX ADMIN — ENOXAPARIN SODIUM SCH MG: 40 INJECTION SUBCUTANEOUS at 08:51

## 2019-06-06 RX ADMIN — SODIUM BICARBONATE SCH MLS/HR: 84 INJECTION INTRAVENOUS at 09:29

## 2019-06-06 RX ADMIN — LINEZOLID SCH MLS/HR: 600 INJECTION, SOLUTION INTRAVENOUS at 04:00

## 2019-06-06 RX ADMIN — NOREPINEPHRINE BITARTRATE PRN MLS/HR: 1 INJECTION INTRAVENOUS at 08:58

## (undated) DEVICE — GLOVE BIOGEL INDICATOR SZ 7SURGICAL PF LTX - (50/BX 4BX/CA)

## (undated) DEVICE — SYRINGE SAFETY TB 1 ML 27 GA X 1/2 IN (100/BX 4BX/CA)

## (undated) DEVICE — KIT SURGIFLO W/OUT THROMBIN - (6EA/CA)

## (undated) DEVICE — KIT ANESTHESIA W/CIRCUIT & 3/LT BAG W/FILTER (20EA/CA)

## (undated) DEVICE — SOD CHL INJ 20 CC - (25/BX 4BX/CS)

## (undated) DEVICE — TUBING C&T SET FLYING LEADS DRAIN TUBING (10EA/BX)

## (undated) DEVICE — SURGIFOAM (12X7) - (12EA/CA)

## (undated) DEVICE — GUIDE TRACHE TUBE INTUBATING STYLET 5.0-10.0MM 14FR (20EA/PK)

## (undated) DEVICE — TOWELS CLOTH SURGICAL - (4/PK 20PK/CA)

## (undated) DEVICE — BONE PRESS SPINAL EDITION HENSLER (10EA/CA)

## (undated) DEVICE — SEALER BIPOLAR 2.3 AQUAMANTYS

## (undated) DEVICE — KIT ROOM DECONTAMINATION

## (undated) DEVICE — SUTURE GENERAL

## (undated) DEVICE — GOWN SURGICAL XX-LARGE - (28EA/CA) SIRUS NON REINFORCED

## (undated) DEVICE — DRESSING NON-ADHERING 8 X 3 - (50/BX)

## (undated) DEVICE — GLOVE BIOGEL ECLIPSE PF LATEX SIZE 8.5 (50PR/BX)

## (undated) DEVICE — GLOVE BIOGEL PI INDICATOR SZ 6.5 SURGICAL PF LF - (50/BX 4BX/CA)

## (undated) DEVICE — GLOVE BIOGEL ECLIPSE PF LATEX SIZE 8.0  (50PR/BX)

## (undated) DEVICE — TRAY CATHETER FOLEY URINE METER W/STATLOCK 350ML (10EA/CA)

## (undated) DEVICE — HEADREST PRONEVIEW LARGE - (10/CA)

## (undated) DEVICE — SET LEADWIRE 5 LEAD BEDSIDE DISPOSABLE ECG (1SET OF 5/EA)

## (undated) DEVICE — INTRAOP NEURO IN OR 1:1 PER 15 MIN

## (undated) DEVICE — SUTURE 3-0 VICRYL PLUS SH - 8X 18 INCH (12/BX)

## (undated) DEVICE — PROTECTOR ULNA NERVE - (36PR/CA)

## (undated) DEVICE — TRAY SRGPRP PVP IOD WT PRP - (20/CA)

## (undated) DEVICE — NEPTUNE 4 PORT MANIFOLD - (20/PK)

## (undated) DEVICE — MIDAS LUBRICATOR DIFFUSER PACK (4EA/CA)

## (undated) DEVICE — SUTURE2-0 20CM STRATAFIX SPIRAL PDS CT-1 (12EA/BX)

## (undated) DEVICE — DRAPE LAPAROTOMY T SHEET - (12EA/CA)

## (undated) DEVICE — GOWN WARMING STANDARD FLEX - (30/CA)

## (undated) DEVICE — CANISTER SUCTION 3000ML MECHANICAL FILTER AUTO SHUTOFF MEDI-VAC NONSTERILE LF DISP  (40EA/CA)

## (undated) DEVICE — GOWN SURGEONS X-LARGE - DISP. (30/CA)

## (undated) DEVICE — BONE MILL BM210

## (undated) DEVICE — SENSOR SPO2 NEO LNCS ADHESIVE (20/BX) SEE USER NOTES

## (undated) DEVICE — PACK NEURO - (2EA/CA)

## (undated) DEVICE — MASK ANESTHESIA ADULT  - (100/CA)

## (undated) DEVICE — CUFF BP ADULT MEDIUM DISPOSABLE (20EA/CA)

## (undated) DEVICE — SODIUM CHL IRRIGATION 0.9% 1000ML (12EA/CA)

## (undated) DEVICE — KIT EVACUATER 3 SPRING PVC LF 1/8 DRAIN SIZE (10EA/CA)"

## (undated) DEVICE — DRAPE MAYO STAND - (30/CA)

## (undated) DEVICE — SLEEVE, VASO, THIGH, MED

## (undated) DEVICE — SYRINGE SAFETY 10 ML 18 GA X 1 1/2 BLUNT LL (100/BX 4BX/CA)

## (undated) DEVICE — GLOVE BIOGEL PI ORTHO SZ 7.5 PF LF (40PR/BX)

## (undated) DEVICE — DRAPE STRLE REG TOWEL 18X24 - (10/BX 4BX/CA)"

## (undated) DEVICE — ARMREST CRADLE FOAM - (2PR/PK 12PR/CA)

## (undated) DEVICE — ELECTRODE 850 FOAM ADHESIVE - HYDROGEL RADIOTRNSPRNT (50/PK)

## (undated) DEVICE — NEEDLE SPINAL NON-SAFETY 18 GA X 3 IN (25EA/BX)

## (undated) DEVICE — GLOVE BIOGEL SZ 6.5 SURGICAL PF LTX (50PR/BX 4BX/CA)

## (undated) DEVICE — TUBE E-T HI-LO CUFF 7.0MM (10EA/PK)

## (undated) DEVICE — SPONGE GAUZESTER 4 X 4 4PLY - (128PK/CA)

## (undated) DEVICE — ELECTRODE DUAL RETURN W/ CORD - (50/PK)

## (undated) DEVICE — TUBING CLEARLINK DUO-VENT - C-FLO (48EA/CA)

## (undated) DEVICE — SUCTION INSTRUMENT YANKAUER BULBOUS TIP W/O VENT (50EA/CA)

## (undated) DEVICE — CHLORAPREP 26 ML APPLICATOR - ORANGE TINT(25/CA)

## (undated) DEVICE — SUTURE 1 VICRYL PLUS CTX - 8 X 18 INCH (12/BX)

## (undated) DEVICE — PACK JACKSON TABLE KIT W/OUT - HR (6EA/CA)

## (undated) DEVICE — DRAPE LARGE 3 QUARTER - (20/CA)

## (undated) DEVICE — TOOL DISSECT MATCH HEAD

## (undated) DEVICE — LACTATED RINGERS INJ 1000 ML - (14EA/CA 60CA/PF)

## (undated) DEVICE — TAPE CLOTH MEDIPORE 6 INCH - (12RL/CA)

## (undated) DEVICE — SPHERE NAVIGATION STEALTH (5EA/TY 12TY/PK)

## (undated) DEVICE — LACTATED RINGERS INJ. 500 ML - (24EA/CA)

## (undated) DEVICE — SLEEVE, VASO, REPROC, LARGE